# Patient Record
Sex: MALE | Race: WHITE | NOT HISPANIC OR LATINO | Employment: FULL TIME | URBAN - METROPOLITAN AREA
[De-identification: names, ages, dates, MRNs, and addresses within clinical notes are randomized per-mention and may not be internally consistent; named-entity substitution may affect disease eponyms.]

---

## 2018-06-28 ENCOUNTER — OFFICE VISIT CONVERTED (OUTPATIENT)
Dept: INTERNAL MEDICINE | Facility: CLINIC | Age: 68
End: 2018-06-28
Attending: NURSE PRACTITIONER

## 2019-01-07 ENCOUNTER — OFFICE VISIT CONVERTED (OUTPATIENT)
Dept: INTERNAL MEDICINE | Facility: CLINIC | Age: 69
End: 2019-01-07
Attending: NURSE PRACTITIONER

## 2019-06-16 ENCOUNTER — HOSPITAL ENCOUNTER (OUTPATIENT)
Dept: URGENT CARE | Facility: CLINIC | Age: 69
Discharge: HOME | End: 2019-06-16
Attending: FAMILY MEDICINE

## 2019-06-28 ENCOUNTER — OFFICE VISIT CONVERTED (OUTPATIENT)
Dept: INTERNAL MEDICINE | Facility: CLINIC | Age: 69
End: 2019-06-28
Attending: NURSE PRACTITIONER

## 2019-07-03 ENCOUNTER — CONVERSION ENCOUNTER (OUTPATIENT)
Dept: SURGERY | Facility: CLINIC | Age: 69
End: 2019-07-03

## 2019-07-03 ENCOUNTER — OFFICE VISIT CONVERTED (OUTPATIENT)
Dept: UROLOGY | Facility: CLINIC | Age: 69
End: 2019-07-03
Attending: UROLOGY

## 2019-07-08 ENCOUNTER — OFFICE VISIT CONVERTED (OUTPATIENT)
Dept: INTERNAL MEDICINE | Facility: CLINIC | Age: 69
End: 2019-07-08
Attending: NURSE PRACTITIONER

## 2019-10-01 ENCOUNTER — OFFICE VISIT CONVERTED (OUTPATIENT)
Dept: UROLOGY | Facility: CLINIC | Age: 69
End: 2019-10-01
Attending: UROLOGY

## 2020-01-09 ENCOUNTER — OFFICE VISIT CONVERTED (OUTPATIENT)
Dept: INTERNAL MEDICINE | Facility: CLINIC | Age: 70
End: 2020-01-09
Attending: NURSE PRACTITIONER

## 2020-01-24 ENCOUNTER — HOSPITAL ENCOUNTER (OUTPATIENT)
Dept: OTHER | Facility: HOSPITAL | Age: 70
Discharge: HOME | End: 2020-01-24
Attending: NURSE PRACTITIONER

## 2020-01-24 LAB
BASOPHILS # BLD AUTO: 0.09 10*3/UL (ref 0–0.2)
BASOPHILS # BLD: 1 % (ref 0–3)
BASOPHILS NFR BLD AUTO: 0.5 % (ref 0–3)
CLUMPED PLATELETS: ABNORMAL
CONV ABS BANDS: 0 % (ref 1–5)
CONV ABS IMM GRAN: 0.07 10*3/UL (ref 0–0.2)
CONV ANISOCYTES: SLIGHT
CONV ATYPICAL LYMPHOCYTES: 27 % (ref 0–5)
CONV BLASTS: 2 %
CONV HYPOCHROMIA IN BLOOD BY LIGHT MICROSCOPY: SLIGHT
CONV IMMATURE GRAN: 0.4 % (ref 0–1.8)
CONV SEGMENTED NEUTROPHILS: 38 % (ref 45–70)
DACRYOCYTES BLD QL SMEAR: ABNORMAL
DEPRECATED RDW RBC AUTO: 42.6 FL (ref 35.1–43.9)
EOSINOPHIL # BLD AUTO: 0.21 10*3/UL (ref 0–0.7)
EOSINOPHIL # BLD AUTO: 1.2 % (ref 0–7)
EOSINOPHIL NFR BLD AUTO: 2 % (ref 0–7)
ERYTHROCYTE [DISTWIDTH] IN BLOOD BY AUTOMATED COUNT: 12.8 % (ref 11.6–14.4)
GIANT PLATELETS: ABNORMAL
HCT VFR BLD AUTO: 41.3 % (ref 42–52)
HGB BLD-MCNC: 12.9 G/DL (ref 14–18)
HYPOGRANULAR PLATELETS: SLIGHT
LARGE PLATELETS: SLIGHT
LYMPHOCYTES # BLD AUTO: 10.63 10*3/UL (ref 1–5)
LYMPHOCYTES NFR BLD AUTO: 62.7 % (ref 20–45)
MCH RBC QN AUTO: 28.2 PG (ref 27–31)
MCHC RBC AUTO-ENTMCNC: 31.2 G/DL (ref 33–37)
MCV RBC AUTO: 90.2 FL (ref 80–96)
MICROCYTES BLD QL: SLIGHT
MONOCYTES # BLD AUTO: 0.95 10*3/UL (ref 0.2–1.2)
MONOCYTES NFR BLD AUTO: 5.6 % (ref 3–10)
MONOCYTES NFR BLD MANUAL: 6 % (ref 3–10)
NEUTROPHILS # BLD AUTO: 5 10*3/UL (ref 2–8)
NEUTROPHILS NFR BLD AUTO: 29.6 % (ref 30–85)
NRBC CBCN: 0 % (ref 0–0.7)
NUC CELL # PRT MANUAL: 0 /100{WBCS}
OVALOCYTES BLD QL SMEAR: ABNORMAL
PLAT MORPH BLD: ABNORMAL
PLATELET # BLD AUTO: 234 10*3/UL (ref 130–400)
PMV BLD AUTO: 10.7 FL (ref 9.4–12.4)
POIKILOCYTOSIS BLD QL SMEAR: ABNORMAL
RBC # BLD AUTO: 4.58 10*6/UL (ref 4.7–6.1)
SMALL PLATELETS BLD QL SMEAR: ADEQUATE
SMUDGE CELLS IN BLOOD BY LIGHT MICROSCOPY: ABNORMAL
VARIANT LYMPHS NFR BLD MANUAL: 24 % (ref 20–45)
WBC # BLD AUTO: 16.95 10*3/UL (ref 4.8–10.8)

## 2020-03-24 ENCOUNTER — OFFICE VISIT CONVERTED (OUTPATIENT)
Dept: ONCOLOGY | Facility: HOSPITAL | Age: 70
End: 2020-03-24
Attending: INTERNAL MEDICINE

## 2020-03-24 ENCOUNTER — HOSPITAL ENCOUNTER (OUTPATIENT)
Dept: ONCOLOGY | Facility: HOSPITAL | Age: 70
Discharge: HOME | End: 2020-03-24
Attending: INTERNAL MEDICINE

## 2020-03-24 LAB
ALBUMIN SERPL-MCNC: 4.2 G/DL (ref 3.5–5)
ALBUMIN/GLOB SERPL: 1.6 {RATIO} (ref 1.4–2.6)
ALP SERPL-CCNC: 116 U/L (ref 56–155)
ALT SERPL-CCNC: 17 U/L (ref 10–40)
ANION GAP SERPL CALC-SCNC: 16 MMOL/L (ref 8–19)
AST SERPL-CCNC: 16 U/L (ref 15–50)
BASOPHILS # BLD AUTO: 0.09 10*3/UL (ref 0–0.2)
BASOPHILS NFR BLD AUTO: 0.5 % (ref 0–3)
BILIRUB SERPL-MCNC: 0.49 MG/DL (ref 0.2–1.3)
BUN SERPL-MCNC: 29 MG/DL (ref 5–25)
BUN/CREAT SERPL: 23 {RATIO} (ref 6–20)
CALCIUM SERPL-MCNC: 9.4 MG/DL (ref 8.7–10.4)
CHLORIDE SERPL-SCNC: 100 MMOL/L (ref 99–111)
CONV ABS IMM GRAN: 0.04 10*3/UL (ref 0–0.2)
CONV CO2: 25 MMOL/L (ref 22–32)
CONV IMMATURE GRAN: 0.2 % (ref 0–1.8)
CONV TOTAL PROTEIN: 6.9 G/DL (ref 6.3–8.2)
CREAT UR-MCNC: 1.26 MG/DL (ref 0.7–1.2)
DEPRECATED RDW RBC AUTO: 41.8 FL (ref 35.1–43.9)
EOSINOPHIL # BLD AUTO: 0.16 10*3/UL (ref 0–0.7)
EOSINOPHIL # BLD AUTO: 1 % (ref 0–7)
ERYTHROCYTE [DISTWIDTH] IN BLOOD BY AUTOMATED COUNT: 12.9 % (ref 11.6–14.4)
GFR SERPLBLD BASED ON 1.73 SQ M-ARVRAT: 59 ML/MIN/{1.73_M2}
GLOBULIN UR ELPH-MCNC: 2.7 G/DL (ref 2–3.5)
GLUCOSE SERPL-MCNC: 126 MG/DL (ref 70–99)
HCT VFR BLD AUTO: 46.5 % (ref 42–52)
HGB BLD-MCNC: 14.7 G/DL (ref 14–18)
LDH SERPL-CCNC: 165 U/L (ref 120–240)
LYMPHOCYTES # BLD AUTO: 9.69 10*3/UL (ref 1–5)
LYMPHOCYTES NFR BLD AUTO: 58.6 % (ref 20–45)
MCH RBC QN AUTO: 28.3 PG (ref 27–31)
MCHC RBC AUTO-ENTMCNC: 31.6 G/DL (ref 33–37)
MCV RBC AUTO: 89.4 FL (ref 80–96)
MONOCYTES # BLD AUTO: 0.87 10*3/UL (ref 0.2–1.2)
MONOCYTES NFR BLD AUTO: 5.3 % (ref 3–10)
NEUTROPHILS # BLD AUTO: 5.69 10*3/UL (ref 2–8)
NEUTROPHILS NFR BLD AUTO: 34.4 % (ref 30–85)
NRBC CBCN: 0 % (ref 0–0.7)
OSMOLALITY SERPL CALC.SUM OF ELEC: 291 MOSM/KG (ref 273–304)
PLATELET # BLD AUTO: 240 10*3/UL (ref 130–400)
PMV BLD AUTO: 10.6 FL (ref 9.4–12.4)
POTASSIUM SERPL-SCNC: 4.1 MMOL/L (ref 3.5–5.3)
RBC # BLD AUTO: 5.2 10*6/UL (ref 4.7–6.1)
SODIUM SERPL-SCNC: 137 MMOL/L (ref 135–147)
WBC # BLD AUTO: 16.54 10*3/UL (ref 4.8–10.8)

## 2020-03-26 LAB — CONV LEUKEMIA/LYMPHOMA PHENOTYPING PROFILE (BLOOD): NORMAL

## 2020-03-31 LAB — GENETIC DISEASES BLD/T FISH: NORMAL

## 2020-04-02 ENCOUNTER — HOSPITAL ENCOUNTER (OUTPATIENT)
Dept: LAB | Facility: HOSPITAL | Age: 70
Discharge: HOME | End: 2020-04-02
Attending: NURSE PRACTITIONER

## 2020-04-02 LAB
ALBUMIN SERPL-MCNC: 4.1 G/DL (ref 3.5–5)
ALBUMIN/GLOB SERPL: 1.7 {RATIO} (ref 1.4–2.6)
ALP SERPL-CCNC: 110 U/L (ref 56–155)
ALT SERPL-CCNC: 20 U/L (ref 10–40)
ANION GAP SERPL CALC-SCNC: 19 MMOL/L (ref 8–19)
AST SERPL-CCNC: 15 U/L (ref 15–50)
BASOPHILS # BLD AUTO: 0.1 10*3/UL (ref 0–0.2)
BASOPHILS NFR BLD AUTO: 0.6 % (ref 0–3)
BILIRUB SERPL-MCNC: 0.47 MG/DL (ref 0.2–1.3)
BUN SERPL-MCNC: 27 MG/DL (ref 5–25)
BUN/CREAT SERPL: 23 {RATIO} (ref 6–20)
CALCIUM SERPL-MCNC: 9.2 MG/DL (ref 8.7–10.4)
CHLORIDE SERPL-SCNC: 103 MMOL/L (ref 99–111)
CHOLEST SERPL-MCNC: 170 MG/DL (ref 107–200)
CHOLEST/HDLC SERPL: 4 {RATIO} (ref 3–6)
CONV ABS IMM GRAN: 0.07 10*3/UL (ref 0–0.2)
CONV CO2: 25 MMOL/L (ref 22–32)
CONV IMMATURE GRAN: 0.4 % (ref 0–1.8)
CONV TOTAL PROTEIN: 6.5 G/DL (ref 6.3–8.2)
CREAT UR-MCNC: 1.19 MG/DL (ref 0.7–1.2)
DEPRECATED RDW RBC AUTO: 42.5 FL (ref 35.1–43.9)
EOSINOPHIL # BLD AUTO: 0.18 10*3/UL (ref 0–0.7)
EOSINOPHIL # BLD AUTO: 1 % (ref 0–7)
ERYTHROCYTE [DISTWIDTH] IN BLOOD BY AUTOMATED COUNT: 13 % (ref 11.6–14.4)
EST. AVERAGE GLUCOSE BLD GHB EST-MCNC: 134 MG/DL
GFR SERPLBLD BASED ON 1.73 SQ M-ARVRAT: >60 ML/MIN/{1.73_M2}
GLOBULIN UR ELPH-MCNC: 2.4 G/DL (ref 2–3.5)
GLUCOSE SERPL-MCNC: 115 MG/DL (ref 70–99)
HBA1C MFR BLD: 6.3 % (ref 3.5–5.7)
HCT VFR BLD AUTO: 44.6 % (ref 42–52)
HDLC SERPL-MCNC: 43 MG/DL (ref 40–60)
HGB BLD-MCNC: 14 G/DL (ref 14–18)
LDLC SERPL CALC-MCNC: 111 MG/DL (ref 70–100)
LYMPHOCYTES # BLD AUTO: 10.86 10*3/UL (ref 1–5)
LYMPHOCYTES NFR BLD AUTO: 62 % (ref 20–45)
MCH RBC QN AUTO: 28.2 PG (ref 27–31)
MCHC RBC AUTO-ENTMCNC: 31.4 G/DL (ref 33–37)
MCV RBC AUTO: 89.7 FL (ref 80–96)
MONOCYTES # BLD AUTO: 1.03 10*3/UL (ref 0.2–1.2)
MONOCYTES NFR BLD AUTO: 5.9 % (ref 3–10)
NEUTROPHILS # BLD AUTO: 5.27 10*3/UL (ref 2–8)
NEUTROPHILS NFR BLD AUTO: 30.1 % (ref 30–85)
NRBC CBCN: 0 % (ref 0–0.7)
OSMOLALITY SERPL CALC.SUM OF ELEC: 302 MOSM/KG (ref 273–304)
PLATELET # BLD AUTO: 232 10*3/UL (ref 130–400)
PMV BLD AUTO: 10.4 FL (ref 9.4–12.4)
POTASSIUM SERPL-SCNC: 3.9 MMOL/L (ref 3.5–5.3)
PSA SERPL-MCNC: 9.77 NG/ML (ref 0–4)
RBC # BLD AUTO: 4.97 10*6/UL (ref 4.7–6.1)
SODIUM SERPL-SCNC: 143 MMOL/L (ref 135–147)
TRIGL SERPL-MCNC: 80 MG/DL (ref 40–150)
TSH SERPL-ACNC: 1.94 M[IU]/L (ref 0.27–4.2)
VLDLC SERPL-MCNC: 16 MG/DL (ref 5–37)
WBC # BLD AUTO: 17.51 10*3/UL (ref 4.8–10.8)

## 2020-04-10 ENCOUNTER — TELEMEDICINE CONVERTED (OUTPATIENT)
Dept: INTERNAL MEDICINE | Facility: CLINIC | Age: 70
End: 2020-04-10
Attending: NURSE PRACTITIONER

## 2020-06-12 ENCOUNTER — HOSPITAL ENCOUNTER (OUTPATIENT)
Dept: SLEEP MEDICINE | Facility: HOSPITAL | Age: 70
Discharge: HOME | End: 2020-06-12
Attending: INTERNAL MEDICINE

## 2020-06-16 ENCOUNTER — HOSPITAL ENCOUNTER (OUTPATIENT)
Dept: ONCOLOGY | Facility: HOSPITAL | Age: 70
Discharge: HOME | End: 2020-06-16
Attending: INTERNAL MEDICINE

## 2020-06-16 ENCOUNTER — OFFICE VISIT CONVERTED (OUTPATIENT)
Dept: ONCOLOGY | Facility: HOSPITAL | Age: 70
End: 2020-06-16
Attending: INTERNAL MEDICINE

## 2020-06-16 LAB
ALBUMIN SERPL-MCNC: 4 G/DL (ref 3.5–5)
ALBUMIN/GLOB SERPL: 1.8 {RATIO} (ref 1.4–2.6)
ALP SERPL-CCNC: 114 U/L (ref 56–155)
ALT SERPL-CCNC: 17 U/L (ref 10–40)
ANION GAP SERPL CALC-SCNC: 13 MMOL/L (ref 8–19)
AST SERPL-CCNC: 15 U/L (ref 15–50)
BASOPHILS # BLD AUTO: 0.05 10*3/UL (ref 0–0.2)
BASOPHILS NFR BLD AUTO: 0.3 % (ref 0–3)
BILIRUB SERPL-MCNC: 0.66 MG/DL (ref 0.2–1.3)
BUN SERPL-MCNC: 22 MG/DL (ref 5–25)
BUN/CREAT SERPL: 20 {RATIO} (ref 6–20)
CALCIUM SERPL-MCNC: 8.7 MG/DL (ref 8.7–10.4)
CALCIUM SPEC-SCNC: 8.7 MG/DL (ref 8.7–10.4)
CHLORIDE SERPL-SCNC: 101 MMOL/L (ref 99–111)
CONV ABS IMM GRAN: 0.04 10*3/UL (ref 0–0.54)
CONV CO2: 28 MMOL/L (ref 22–32)
CONV EOSINOPHILS PERCENT BY MANUAL COUNT: 1.8 % (ref 0–7)
CONV IMMATURE GRAN: 0.3 % (ref 0–0.4)
CONV TOTAL PROTEIN: 6.2 G/DL (ref 6.3–8.2)
CREAT UR-MCNC: 1.12 MG/DL (ref 0.7–1.2)
EOSINOPHIL # BLD MANUAL: 0.28 10*3/UL (ref 0–0.7)
ERYTHROCYTE [DISTWIDTH] IN BLOOD BY AUTOMATED COUNT: 12.7 % (ref 11.5–14.5)
ERYTHROCYTE [DISTWIDTH] IN BLOOD BY AUTOMATED COUNT: 41.5 FL
GFR SERPLBLD BASED ON 1.73 SQ M-ARVRAT: >60 ML/MIN/{1.73_M2}
GLOBULIN UR ELPH-MCNC: 2.2 G/DL (ref 2–3.5)
GLUCOSE SERPL-MCNC: 133 MG/DL (ref 70–99)
HBA1C MFR BLD: 14.1 G/DL (ref 14–18)
HCT VFR BLD AUTO: 43 % (ref 42–52)
LDH SERPL-CCNC: 191 U/L (ref 120–240)
LYMPHOCYTES # BLD AUTO: 9.22 10*3/UL (ref 1–5)
LYMPHOCYTES NFR BLD AUTO: 59.4 % (ref 20–45)
MCH RBC QN AUTO: 28.8 PG (ref 27–31)
MCHC RBC AUTO-ENTMCNC: 32.8 G/DL (ref 33–37)
MCV RBC AUTO: 87.8 FL (ref 80–96)
MONOCYTES # BLD AUTO: 1 10*3/UL (ref 0.2–1.2)
MONOCYTES NFR BLD MANUAL: 6.4 % (ref 3–10)
NEUTROPHILS # BLD AUTO: 4.92 10*3/UL (ref 2–8)
NEUTROPHILS NFR BLD MANUAL: 31.8 % (ref 30–85)
OSMOLALITY SERPL CALC.SUM OF ELEC: 291 MOSM/KG (ref 273–304)
PATHOLOGY REVIEW: NORMAL
PLATELET # BLD AUTO: 216 10*3/UL (ref 130–400)
PMV BLD AUTO: 9.6 FL (ref 7.4–10.4)
POTASSIUM SERPL-SCNC: 3.8 MMOL/L (ref 3.5–5.3)
RBC MORPH BLD: 4.9 10*6/UL (ref 4.7–6.1)
SODIUM SERPL-SCNC: 138 MMOL/L (ref 135–147)
WBC # BLD AUTO: 15.51 10*3/UL (ref 4.8–10.8)

## 2020-10-09 ENCOUNTER — HOSPITAL ENCOUNTER (OUTPATIENT)
Dept: LAB | Facility: HOSPITAL | Age: 70
Discharge: HOME | End: 2020-10-09
Attending: NURSE PRACTITIONER

## 2020-10-09 LAB
ALBUMIN SERPL-MCNC: 4.1 G/DL (ref 3.5–5)
ALBUMIN/GLOB SERPL: 1.7 {RATIO} (ref 1.4–2.6)
ALP SERPL-CCNC: 110 U/L (ref 56–155)
ALT SERPL-CCNC: 18 U/L (ref 10–40)
ANION GAP SERPL CALC-SCNC: 15 MMOL/L (ref 8–19)
AST SERPL-CCNC: 20 U/L (ref 15–50)
BILIRUB SERPL-MCNC: 0.75 MG/DL (ref 0.2–1.3)
BUN SERPL-MCNC: 21 MG/DL (ref 5–25)
BUN/CREAT SERPL: 18 {RATIO} (ref 6–20)
CALCIUM SERPL-MCNC: 9.5 MG/DL (ref 8.7–10.4)
CHLORIDE SERPL-SCNC: 104 MMOL/L (ref 99–111)
CHOLEST SERPL-MCNC: 148 MG/DL (ref 107–200)
CHOLEST/HDLC SERPL: 3.2 {RATIO} (ref 3–6)
CONV CO2: 25 MMOL/L (ref 22–32)
CONV TOTAL PROTEIN: 6.5 G/DL (ref 6.3–8.2)
CREAT UR-MCNC: 1.19 MG/DL (ref 0.7–1.2)
GFR SERPLBLD BASED ON 1.73 SQ M-ARVRAT: >60 ML/MIN/{1.73_M2}
GLOBULIN UR ELPH-MCNC: 2.4 G/DL (ref 2–3.5)
GLUCOSE SERPL-MCNC: 86 MG/DL (ref 70–99)
HDLC SERPL-MCNC: 46 MG/DL (ref 40–60)
LDLC SERPL CALC-MCNC: 87 MG/DL (ref 70–100)
OSMOLALITY SERPL CALC.SUM OF ELEC: 292 MOSM/KG (ref 273–304)
POTASSIUM SERPL-SCNC: 4.1 MMOL/L (ref 3.5–5.3)
SODIUM SERPL-SCNC: 140 MMOL/L (ref 135–147)
TRIGL SERPL-MCNC: 75 MG/DL (ref 40–150)
VLDLC SERPL-MCNC: 15 MG/DL (ref 5–37)

## 2020-10-10 LAB
EST. AVERAGE GLUCOSE BLD GHB EST-MCNC: 134 MG/DL
HBA1C MFR BLD: 6.3 % (ref 3.5–5.7)

## 2020-10-12 ENCOUNTER — OFFICE VISIT CONVERTED (OUTPATIENT)
Dept: INTERNAL MEDICINE | Facility: CLINIC | Age: 70
End: 2020-10-12
Attending: NURSE PRACTITIONER

## 2020-10-12 LAB — PSA SERPL-MCNC: 11.28 NG/ML (ref 0–4)

## 2020-11-17 ENCOUNTER — TELEPHONE CONVERTED (OUTPATIENT)
Dept: UROLOGY | Facility: CLINIC | Age: 70
End: 2020-11-17
Attending: UROLOGY

## 2020-12-14 ENCOUNTER — HOSPITAL ENCOUNTER (OUTPATIENT)
Dept: ONCOLOGY | Facility: HOSPITAL | Age: 70
Discharge: HOME | End: 2020-12-14
Attending: INTERNAL MEDICINE

## 2020-12-14 LAB
ALBUMIN SERPL-MCNC: 4.1 G/DL (ref 3.5–5)
ALBUMIN/GLOB SERPL: 1.9 {RATIO} (ref 1.4–2.6)
ALP SERPL-CCNC: 110 U/L (ref 56–155)
ALT SERPL-CCNC: 19 U/L (ref 10–40)
ANION GAP SERPL CALC-SCNC: 9 MMOL/L (ref 8–19)
AST SERPL-CCNC: 14 U/L (ref 15–50)
BASOPHILS # BLD AUTO: 0.03 10*3/UL (ref 0–0.2)
BASOPHILS # BLD: 0 % (ref 0–3)
BASOPHILS NFR BLD AUTO: 0.2 % (ref 0–3)
BILIRUB SERPL-MCNC: 0.63 MG/DL (ref 0.2–1.3)
BUN SERPL-MCNC: 20 MG/DL (ref 5–25)
BUN/CREAT SERPL: 18 {RATIO} (ref 6–20)
CALCIUM SERPL-MCNC: 9.2 MG/DL (ref 8.7–10.4)
CHLORIDE SERPL-SCNC: 104 MMOL/L (ref 99–111)
CONV ABS BANDS: 0 % (ref 1–5)
CONV ABS IMM GRAN: 0.02 10*3/UL (ref 0–0.54)
CONV ATYPICAL LYMPHOCYTES: 33 % (ref 0–5)
CONV CO2: 30 MMOL/L (ref 22–32)
CONV EOSINOPHILS PERCENT BY MANUAL COUNT: 1.2 % (ref 0–7)
CONV IMMATURE GRAN: 0.1 % (ref 0–0.4)
CONV SEGMENTED NEUTROPHILS: 45 % (ref 45–70)
CONV TOTAL PROTEIN: 6.3 G/DL (ref 6.3–8.2)
CREAT UR-MCNC: 1.09 MG/DL (ref 0.7–1.2)
EOSINOPHIL # BLD MANUAL: 0.18 10*3/UL (ref 0–0.7)
EOSINOPHIL NFR BLD AUTO: 0 % (ref 0–7)
ERYTHROCYTE [DISTWIDTH] IN BLOOD BY AUTOMATED COUNT: 12.9 % (ref 11.5–14.5)
ERYTHROCYTE [DISTWIDTH] IN BLOOD BY AUTOMATED COUNT: 41.7 FL
GFR SERPLBLD BASED ON 1.73 SQ M-ARVRAT: >60 ML/MIN/{1.73_M2}
GLOBULIN UR ELPH-MCNC: 2.2 G/DL (ref 2–3.5)
GLUCOSE SERPL-MCNC: 117 MG/DL (ref 70–99)
HBA1C MFR BLD: 14.7 G/DL (ref 14–18)
HCT VFR BLD AUTO: 46.1 % (ref 42–52)
LDH SERPL-CCNC: 143 U/L (ref 120–240)
LYMPHOCYTES # BLD AUTO: 8.71 10*3/UL (ref 1–5)
LYMPHOCYTES NFR BLD AUTO: 55.7 % (ref 20–45)
MCH RBC QN AUTO: 27.9 PG (ref 27–31)
MCHC RBC AUTO-ENTMCNC: 31.9 G/DL (ref 33–37)
MCV RBC AUTO: 87.5 FL (ref 80–96)
MONOCYTES # BLD AUTO: 0.79 10*3/UL (ref 0.2–1.2)
MONOCYTES NFR BLD MANUAL: 3 % (ref 3–10)
MONOCYTES NFR BLD MANUAL: 5.1 % (ref 3–10)
NEUTROPHILS # BLD AUTO: 5.9 10*3/UL (ref 2–8)
NEUTROPHILS NFR BLD MANUAL: 37.7 % (ref 30–85)
NUC CELL # PRT MANUAL: 0 /100{WBCS}
OSMOLALITY SERPL CALC.SUM OF ELEC: 292 MOSM/KG (ref 273–304)
PATHOLOGY REVIEW: NORMAL
PLAT MORPH BLD: NORMAL
PLATELET # BLD AUTO: 233 10*3/UL (ref 130–400)
PMV BLD AUTO: 9.6 FL (ref 7.4–10.4)
POTASSIUM SERPL-SCNC: 4.4 MMOL/L (ref 3.5–5.3)
RBC MORPH BLD: 5.27 10*6/UL (ref 4.7–6.1)
SMALL PLATELETS BLD QL SMEAR: ADEQUATE
SMUDGE CELLS IN BLOOD BY LIGHT MICROSCOPY: SLIGHT
SODIUM SERPL-SCNC: 139 MMOL/L (ref 135–147)
VARIANT LYMPHS NFR BLD MANUAL: 19 % (ref 20–45)
WBC # BLD AUTO: 15.63 10*3/UL (ref 4.8–10.8)

## 2020-12-15 ENCOUNTER — OFFICE VISIT CONVERTED (OUTPATIENT)
Dept: ONCOLOGY | Facility: HOSPITAL | Age: 70
End: 2020-12-15
Attending: INTERNAL MEDICINE

## 2020-12-15 ENCOUNTER — HOSPITAL ENCOUNTER (OUTPATIENT)
Dept: ONCOLOGY | Facility: HOSPITAL | Age: 70
Discharge: HOME | End: 2020-12-15
Attending: INTERNAL MEDICINE

## 2021-05-12 NOTE — PROGRESS NOTES
Quick Note      Patient Name: Quique Valdez   Patient ID: [id number]   Sex: Male   YOB: 1950    Primary Care Provider: Charmaine ASIF   Referring Provider: Charmaine ASIF    Visit Date: April 10, 2020    Provider: YEFRI Mandujano   Location: [facility]   Location Address: [address] [place] [address], [address]  [id number]   Location Phone: [phone]          History Of Present Illness  TELEHEALTH VISIT  Chief Complaint: f/u   Quique Valdez is a 69 year old /White male who is presenting for evaluation via telehealth visit. Verbal consent obtained before beginning visit.   Provider spent 13 minutes with the patient during telehealth visit.   The following staff were present during this visit: Charmaine Pate NP   Past Medical History/Overview of Patient Symptoms     still working at [employer], security    Has seen hem/onc for leukocytosis, q3 mths    Seeing urology-for PSA, had MRI, PSA 9.7 down from 16    labs reviewed    HLD-has been taking pravastatin 40mg daily after finishing last prescription where he was taking 2 40mg tabs    via phone       Physical Examination                Assessment  · Elevated Prostate Specific Antigen (PSA)     790.93/R97.2  f/u with Dr. Lane  · Impaired fasting blood sugar     790.21/R73.01  labs q6 mth  · HTN (hypertension)     401.9/I10  doing well, cont meds  · HLD (hyperlipidemia)     272.4/E78.5  restarting pravastatin 80mg, discussed LDL <70  · Leukocytosis     288.60/D72.829  f/u hem/onc    Problems Reconciled  Plan  · Orders  o Physician Telephone Evaluation, 11-20 minutes (87706) - - 04/10/2020  o Hgb A1c [facility] (24016) - 790.21/R73.01 - 10/10/2020  o HTN/Lipid Panel (CMP, Lipid) [facility] (48169, 17481) - 401.9/I10, 272.4/E78.5 - 10/10/2020  · Medications  o Medications have been Reconciled  o Transition of Care or Provider Policy  · Instructions  o Plan Of Care:   o Chronic conditions reviewed and  taken into consideration for today's treatment plan.  o Patient instructed to seek medical attention urgently for new or worsening symptoms.  o Patient was educated/instructed on their diagnosis, treatment and medications prior to discharge from the clinic today.  o Discussed Covid-19 precautions including, but not limited to, social distancing, avoid touching your face, and hand washing.   · Disposition  o Call or Return if symptoms worsen or persist.  o Follow up in 6 months  o Prescriptions sent electronically            Electronically Signed by: Charmaine Pate APRN -Author on April 28, 2020 12:24:43 PM

## 2021-05-13 NOTE — PROGRESS NOTES
Progress Note      Patient Name: Quique Valdez   Patient ID: [id number]   Sex: Male   YOB: 1950    Primary Care Provider: Charmaine ASIF   Referring Provider: Charmaine ASIF    Visit Date: October 12, 2020    Provider: YEFRI Mandujano   Location[facility]   Location Address: [address] [place] [address]  [id number]   Location Phone: [phone]          Chief Complaint  · Follow-up  · HTN  · HLD  · BPH      History Of Present Illness  Quique Valdez is a 69 year old /White male who presents for evaluation and treatment of:      AWV:  AAA screening; defers  Hep c screening: in the .   Flu shot: 10/10/2020 at work.     PSA needs-recent MRI for prostate, believes he had PSA 6 mths  recent labs, cmp/lipid, a1c.  Does not need refills at this time    HTN-elevated today at the office. patient reports coming to visits makes his BP go up. He has also not taken his BP medication yet today. 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    HLD-no leg cramps, taking pravastatin 40mg       Past Medical History  Disease Name Date Onset Notes   BPH (benign prostatic hyperplasia) 10/01/2019 --    Elevated Prostate Specific Antigen (PSA) --  --    GERD --  --    High blood pressure --  --    High cholesterol --  --          Past Surgical History  Procedure Name Date Notes   Colonoscopy 2007 2018 --    Hernia 2005 --          Medication List  Name Date Started Instructions   aspirin 81 mg oral tablet,delayed release (DR/EC) 04/10/2020 take 1 tablet (81 mg) by oral route once daily for 90 days   hydrochlorothiazide 12.5 mg oral tablet 04/10/2020 take 1 tablet (12.5 mg) by oral route once daily for 90 days   Lotrel 5-20 mg oral capsule 04/10/2020 take 1 capsule by oral route once daily for 90 days   omeprazole 20 mg oral capsule,delayed release(DR/EC) 04/10/2020 take 1 capsule (20 mg) by oral route once daily before a meal for 90  "days   pravastatin 80 mg oral tablet 04/10/2020 take 1 tablet (80 mg) by oral route once daily   Uroxatral 10 mg oral tablet extended release 24 hr 04/10/2020 take 1 tablet (10 mg) by oral route once daily for 90 days   Viagra 100 mg oral tablet 04/10/2020 take 1 tablet (100 mg) by oral route once daily as needed approximately 1 hour before sexual activity for 90 days         Allergy List  Allergen Name Date Reaction Notes   NO KNOWN DRUG ALLERGIES --  --  --          Family Medical History  Disease Name Relative/Age Notes   - No Family History of Colorectal Cancer  --    Lung cancer Father/   --          Social History  Finding Status Start/Stop Quantity Notes   Alcohol Current some day --/-- --  occasionally   Tobacco Former 21/57 1 to 1 1/2 pks --          Review of Systems  · Constitutional  o Denies  o : fever, fatigue, weight loss, weight gain  · Cardiovascular  o Denies  o : lower extremity edema, claudication, chest pressure, palpitations  · Respiratory  o Denies  o : shortness of breath, wheezing, cough, hemoptysis, dyspnea on exertion  · Gastrointestinal  o Denies  o : nausea, vomiting, diarrhea, constipation, abdominal pain      Vitals  Date Time BP Position Site L\R Cuff Size HR RR TEMP (F) WT  HT  BMI kg/m2 BSA m2 O2 Sat FR L/min FiO2 HC       10/01/2019 08:57 AM       14  267lbs 0oz 5'  10\" 38.31 2.45       01/09/2020 08:47 /82 Sitting    70 - R 14 98.2 281lbs 0oz 5'  10\" 40.32 2.51 97 %  21%    10/12/2020 09:34 /78 Sitting    78 - R 16 98.1 284lbs 8oz 5'  10\" 40.82 2.52 97 %  21%          Physical Examination  · Constitutional  o Appearance  o : no acute distress, well-nourished  · Head and Face  o Head  o :   § Inspection  § : atraumatic, normocephalic  · Eyes  o Eyes  o : extraocular movements intact, no scleral icterus, no conjunctival injection  · Ears, Nose, Mouth and Throat  o Ears  o :   § External Ears  § : normal  o Nose  o :   § Intranasal Exam  § : nares patent  o Oral " Cavity  o :   § Oral Mucosa  § : moist mucous membranes  · Respiratory  o Respiratory Effort  o : breathing comfortably, symmetric chest rise  o Auscultation of Lungs  o : clear to asculatation bilaterally, no wheezes, rales, or rhonchii  · Cardiovascular  o Heart  o :   § Auscultation of Heart  § : regular rate and rhythm, no murmurs, rubs, or gallops  o Peripheral Vascular System  o :   § Extremities  § : no edema  · Lymphatic  o Neck  o : no lymphadenopathy present  o Supraclavicular Nodes  o : no supraclavicular nodes  · Neurologic  o Mental Status Examination  o :   § Orientation  § : grossly oriented to person, place and time  o Gait and Station  o :   § Gait Screening  § : normal gait  · Psychiatric  o General  o : normal mood and affect          Assessment  · Elevated Prostate Specific Antigen (PSA)     790.93/R97.2  he reports he recently got a letter for urology f/u.  will try to add PSA onto labs drawn on 10/10  · Essential hypertension     401.9/I10  Discussed elevated blood pressure at today's visit. Discussed risks of blood pressure elevation including death, heart attack, stroke, chronic kidney disease, blindness. Follow a low-salt diet and increase exercise. Discussed importance of medication compliance and avoidance of missing doses. Continue current meds at this time. We will monitor at follow-up and adjust blood pressure medications if necessary. ER with chest pain, palpitations, vision loss, unilateral weakness, or altered mental status. Patient verbalized understanding  · Hyperlipidemia     272.4/E78.5  discussed goal of <70. he prefers to continue with current dosing and modify diet      Plan  · Orders  o Male Physical Primary Care Panel (CMP, CBC, TSH, Lipid, PSA) [facility] (48007, 39139, 91425, 22020, 53761, ) - 401.9/I10, 272.4/E78.5, 790.93/R97.2 - 04/12/2021  o ACO-39: Current medications updated and reviewed (1159F, ) - - 10/12/2020  · Medications  o Medications have been  Reconciled  o Transition of Care or Provider Policy  · Instructions  o Advised that cheeses and other sources of dairy fats, animal fats, fast food, and the extras (candy, pastries, pies, doughnuts and cookies) all contain LDL raising nutrients. Advised to increase fruits, vegetables, whole grains, and to monitor portion sizes.   o Patient was educated/instructed on their diagnosis, treatment and medications prior to discharge from the clinic today.  o Call the office with any concerns or questions.  · Disposition  o Call or Return if symptoms worsen or persist.  o Follow up in 6 months  o Labs to be printed            Electronically Signed by: Charmaine Pate APRN -Author on October 12, 2020 10:05:08 AM

## 2021-05-13 NOTE — PROGRESS NOTES
Progress Note      Patient Name: Quique Valdez   Patient ID: [id number]   Sex: Male   YOB: 1950    Primary Care Provider: Charmaine ASIF   Referring Provider: Charmaine ASIF    Visit Date: October 12, 2020    Provider: YEFRI Mandujano   [facility]   Location Address: [address] [place] [address]  [id number]   Location Phone: [phone]          Chief Complaint  · Annual Wellness Exam      History Of Present Illness  The patient is a 69 year old /White male who has come to this office for his Annual Wellness Visit.   His Primary Care Provider is Charmaine ASIF. His comprehensive Care Team list, including suppliers, has been updated on the Facesheet. His medical/family history, height, weight, BMI, and blood pressure have been reviewed and are in the chart. The Health Risk Assessment has been completed and scanned in the chart.   Medications are listed in the medication list.   The active problem list includes: BPH (benign prostatic hyperplasia), Elevated Prostate Specific Antigen (PSA), GERD, High blood pressure, and High cholesterol   The patient does not have a history of substance use.   Patient reports his diet is adequate.   The Mini-Cog has been administered and is scanned in chart. The results are negative. His cognitive function is without limitation.   A hearing loss screen was completed today and the result is negative.   Patient does not have any risk factors for depression. Patient completed the PHQ-9 today and it has been scanned in the chart. The total score is.   The Timed Up and Go screen was administered today and the result is negative.   The Rodarte Index of [place] in ADLs indicated full function (score of 6).   A Falls Risk Assessment has been completed, including a review of home fall hazards and medication review.   Overall, the patient's functional ability is noted by this provider to be  "within normal limits. His level of safety is noted to be within normal limits. His balance/gait is within normal limits. There have been no falls in the past year. Patient-specific home safety recommendations have been reviewed and a copy has been given to patient.   He denies issues with leaking urine.   There are no additional risk factors identified.   Living Will/Advanced Directive STATUS OF ADVANCED DIRECTIVE.   Personalized health advice was given to the patient and a written health screening schedule was established; see Plan for details.      Hep C screening: in the .   Flu shot : done   AAA screening: defers         Vitals  Date Time BP Position Site L\R Cuff Size HR RR TEMP (F) WT  HT  BMI kg/m2 BSA m2 O2 Sat FR L/min FiO2 HC       10/12/2020 09:34 /78 Sitting    78 - R 16 98.1 284lbs 8oz 5'  10\" 40.82 2.52 97 %  21%                  Assessment  · Encounter for Medicare annual wellness exam     V70.0/Z00.00  · Screening for depression     V79.0/Z13.89  · Screening for alcoholism     V79.1/Z13.39  · Need for influenza vaccination     V04.81/Z23    Problems Reconciled  Plan  · Orders  o Falls Risk Assessment Completed (3288F) - V70.0/Z00.00 - 10/12/2020  o Brief hearing screening (written) [facility] () - V70.0/Z00.00 - 10/12/2020  o Annual Wellness Visit-includes a Personalized Prevention Plan of Service (PPS), SUBSEQUENT VISIT (Medicare) () - V70.0/Z00.00 - 10/12/2020  o ACO-13: Fall Risk Screening with no falls in past year or only one fall without injury in the past year (1101F) - V70.0/Z00.00 - 10/12/2020  o Presence or absence of urinary incontinence assessed (RAMYA) (1090F) - V70.0/Z00.00 - 10/12/2020  o ACO-18: Negative screen for clinical depression using a standardized tool () - V79.0/Z13.89 - 10/12/2020  o Negative alcohol screening () - V79.1/Z13.39 - 10/12/2020  o ACO-39: Current medications updated and reviewed (, 7909F) - - 10/12/2020  o Influenza immunization was " ordered or administered () - V04.81/Z23 - 10/12/2020   10/10/2020 at patient's place of employement.   · Medications  o Medications have been Reconciled  o Transition of Care or Provider Policy  · Instructions  o Health Risk Assessment has been reviewed with the patient.  o Written health screening schedule for next 5-10 years was established with patient; information scanned in chart and given/mailed to patient.  o Fall prevention methods discussed and a copy of recommendations given/mailed to patient.  o Today's PHQ-9 score is: _0.__            Electronically Signed by: Charmaine Pate, APRN -Author on October 13, 2020 01:13:46 PM

## 2021-05-13 NOTE — PROGRESS NOTES
Progress Note      Patient Name: Quique Valdez   Patient ID: [id number]   Sex: Male   YOB: 1950    Primary Care Provider: Charmaine ASIF   Referring Provider: Charmaine ASIF    Visit Date: November 17, 2020    Provider: Lashonda Lane MD   Location: [facility]   Location Address: [address] [place] [address]  [id number]   Location Phone: [phone]          Chief Complaint  · Pt is here for urological concerns     Telephone Visit- presents for evaluation via telephone.   Verbal consent obtained before beginning visit.   The following staff were present during this visit: Yadira Soto LPN  Total time for encounter: 11 minutes       History Of Present Illness     68-year-old gentleman presents for further evaluation of elevated PSA.  Elevated PSA noted incidentally upon routine lab check by PCP.  Prior history of PSA elevation s/p negative biopsy by Dr. Rose in 2015.  History of BPH; takes alfuzosin.  He denies significant, bothersome lower urinary tract symptoms including urinary hesitancy, urgency, sensation of incomplete emptying, or nocturia. Denies h/o uti or prostatitis. Denies recent weight loss.  Denies new bony pain.    Update 10/1/2019: Patient presents for follow-up after multi-parametric MRI of prostate.  Patient denies changes since last visit.  No complaints today.  States his PSA has been high for years.  Denies bothersome voiding symptoms. Expects to have PSA screening per PCP on post per routine.    Update 11/17/20: Patient presents for routine annual follow-up with PSA prior.  Denies significant changes since last visit.  Complains of bothersome urinary urgency which has worsened since last visit.  Denies weak stream or sensation of incomplete emptying.  Continues to take alfuzosin without adverse side effect.    ____________  PSA trend  10/2020: 11.28  6/2019: 16.45  11/2017: 8.3  1/2017: 7.5  6/2016: 7.2  11/2015: 7.2  6/15:  7.2  3/2015: 7.8    Prostate biopsy: 6/18/2015-right and left negative for malignancy    MRI prostate 9/10/2019: 80 g prostate; PIRAD II x2 lesions; No suspicious lesions (PIRAD IV or V) for malignancy       Past Medical History  BPH (benign prostatic hyperplasia); Elevated prostate specific antigen (PSA); GERD; High blood pressure; High cholesterol         Past Surgical History  Colonoscopy; Hernia         Medication List  aspirin 81 mg oral tablet,delayed release (DR/EC); hydrochlorothiazide 12.5 mg oral tablet; Lotrel 5-20 mg oral capsule; omeprazole 20 mg oral capsule,delayed release(DR/EC); pravastatin 80 mg oral tablet; Uroxatral 10 mg oral tablet extended release 24 hr; Viagra 100 mg oral tablet         Allergy List  NO KNOWN DRUG ALLERGIES       Allergies Reconciled  Family Medical History  - No Family History of Colorectal Cancer; Lung cancer         Social History  Alcohol (Current some day); Tobacco (Former)         Review of Systems  · Constitutional  o Denies  o : chills, fever  · Gastrointestinal  o Denies  o : nausea, vomiting              Assessment  · Elevated Prostate Specific Antigen (PSA)     790.93/R97.2  · BPH (benign prostatic hyperplasia)     600.00/N40.0  · Lower urinary tract symptoms     788.99/R39.9  · Urinary urgency     788.63/R39.15    Problems Reconciled  Plan  · Orders  o Physician Telephone Evaluation, 11-20 minutes (84644) - 790.93/R97.2, 788.99/R39.9, 788.63/R39.15, 600.00/N40.0 - 11/17/2020  o PSA Ultrasensitive, ANNUAL SCREENING [facility] (13451, ) - 790.93/R97.2 - 11/17/2021  · Medications  o Medications have been Reconciled  o Transition of Care or Provider Policy  · Instructions  o Electronically Identified Patient Education Materials Provided Electronically     Elevated PSA-prior negative biopsy and prostate MRI without suspicious lesions; enlarged prostate gland.  PSA 11.2 from 16  Recommend continuing to trend with annual PSA; consideration of repeat prostate MRI if  persistently elevated   Patient notes understanding that whether or not further workup for prostate cancer is pursued,  a diagnosis of clinically significant prostate cancer would remains uncertain unless detected on biopsy. A negative biopsy, although reassuring, would not eliminate the possible need for further surveillance of PSA, possible future biopsy, and imaging as he may have undetected prostate cancer.     PSA in 1 year    BPH with LUTS, urinary urgencycontinue alfuzosin, trial of combination therapy via oxybutynin 10 mg extended release.  Discussed that studies have shown in combination therapy of BPH medication with OAB medication has been shown to improve overall symptoms.  Side effects of this medication discussed including constipation and dry mouth.  Patient agreeable to trial  Oxybutynin 10 mg ER sent to pharmacy    Follow-up phone visit in 2 months  All questions addressed               Electronically Signed by: Lashonda Lane MD -Author on November 17, 2020 02:36:08 PM

## 2021-05-14 VITALS
DIASTOLIC BLOOD PRESSURE: 78 MMHG | WEIGHT: 284.5 LBS | HEIGHT: 70 IN | TEMPERATURE: 98.1 F | RESPIRATION RATE: 16 BRPM | HEART RATE: 78 BPM | SYSTOLIC BLOOD PRESSURE: 146 MMHG | BODY MASS INDEX: 40.73 KG/M2 | OXYGEN SATURATION: 97 %

## 2021-05-15 VITALS
WEIGHT: 260.37 LBS | HEIGHT: 70 IN | RESPIRATION RATE: 14 BRPM | SYSTOLIC BLOOD PRESSURE: 118 MMHG | TEMPERATURE: 98.2 F | HEART RATE: 86 BPM | OXYGEN SATURATION: 96 % | DIASTOLIC BLOOD PRESSURE: 68 MMHG | BODY MASS INDEX: 37.27 KG/M2

## 2021-05-15 VITALS
BODY MASS INDEX: 38.22 KG/M2 | SYSTOLIC BLOOD PRESSURE: 140 MMHG | OXYGEN SATURATION: 97 % | WEIGHT: 267 LBS | DIASTOLIC BLOOD PRESSURE: 84 MMHG | HEIGHT: 70 IN | HEART RATE: 78 BPM | RESPIRATION RATE: 15 BRPM | TEMPERATURE: 97.9 F

## 2021-05-15 VITALS — HEIGHT: 70 IN | WEIGHT: 263 LBS | RESPIRATION RATE: 14 BRPM | BODY MASS INDEX: 37.65 KG/M2

## 2021-05-15 VITALS
TEMPERATURE: 98.2 F | OXYGEN SATURATION: 97 % | DIASTOLIC BLOOD PRESSURE: 82 MMHG | RESPIRATION RATE: 14 BRPM | HEIGHT: 70 IN | HEART RATE: 70 BPM | WEIGHT: 281 LBS | BODY MASS INDEX: 40.23 KG/M2 | SYSTOLIC BLOOD PRESSURE: 142 MMHG

## 2021-05-15 VITALS — RESPIRATION RATE: 14 BRPM | BODY MASS INDEX: 38.22 KG/M2 | WEIGHT: 267 LBS | HEIGHT: 70 IN

## 2021-05-15 VITALS
WEIGHT: 275.12 LBS | RESPIRATION RATE: 15 BRPM | BODY MASS INDEX: 39.39 KG/M2 | DIASTOLIC BLOOD PRESSURE: 82 MMHG | TEMPERATURE: 98.5 F | SYSTOLIC BLOOD PRESSURE: 158 MMHG | HEART RATE: 87 BPM | HEIGHT: 70 IN | OXYGEN SATURATION: 96 %

## 2021-05-16 VITALS
OXYGEN SATURATION: 96 % | WEIGHT: 272.25 LBS | TEMPERATURE: 97.4 F | HEART RATE: 78 BPM | BODY MASS INDEX: 38.98 KG/M2 | HEIGHT: 70 IN | SYSTOLIC BLOOD PRESSURE: 136 MMHG | RESPIRATION RATE: 15 BRPM | DIASTOLIC BLOOD PRESSURE: 86 MMHG

## 2021-05-28 VITALS
OXYGEN SATURATION: 97 % | TEMPERATURE: 99 F | HEIGHT: 68 IN | HEIGHT: 68 IN | DIASTOLIC BLOOD PRESSURE: 87 MMHG | WEIGHT: 280.2 LBS | TEMPERATURE: 97.2 F | BODY MASS INDEX: 42.67 KG/M2 | DIASTOLIC BLOOD PRESSURE: 76 MMHG | BODY MASS INDEX: 42.47 KG/M2 | SYSTOLIC BLOOD PRESSURE: 159 MMHG | RESPIRATION RATE: 20 BRPM | HEART RATE: 75 BPM | SYSTOLIC BLOOD PRESSURE: 151 MMHG | OXYGEN SATURATION: 95 % | HEART RATE: 83 BPM | WEIGHT: 281.53 LBS

## 2021-05-28 VITALS
HEART RATE: 74 BPM | RESPIRATION RATE: 18 BRPM | WEIGHT: 274.69 LBS | SYSTOLIC BLOOD PRESSURE: 150 MMHG | DIASTOLIC BLOOD PRESSURE: 78 MMHG | TEMPERATURE: 97.8 F | BODY MASS INDEX: 39.41 KG/M2 | OXYGEN SATURATION: 96 %

## 2021-05-28 NOTE — PROGRESS NOTES
Patient: GUTIERREZ ARROYO     Acct: [id number]     Report: #[id number]  UNIT #: [id number]     : 1950    Encounter Date:12/15/2020  PRIMARY CARE: ROBBIN ELIZABETH  ***Signed***  --------------------------------------------------------------------------------------------------------------------  NURSE INTAKE      Visit Type      Established Patient Visit            Chief Complaint      LEUKOCYTOSIS            Referring Provider/Copies To      Primary Care Provider:  ROBBIN ELIZABETH      Copies To:   ROBBIN ELIZABETH            History and Present Illness      Past History      CD5+ B-cell lymphoproliferative disorder:            The patient has had a mildly elevated white blood cell count for approximately 2    years.  He reports no significant symptoms.  He denies abdominal pain, cough,     fever, night sweats, or weight loss.  In fact he reports gaining weight.              On review of the patient's labs since 2020 his white blood cell count     has been elevated between 15-17,000.  On 2020 his absolute lymphocyte count    was also elevated at 9000.            3/24/2020: Flow cytometry shows CD5 positive B-cell lymphoproliferative     disorder.  There is a monoclonal B-cell population with kappa light chain     restriction.  The phenotype is not typical for CLL/SLL or mantle cell lymphoma.     Cytogenetic studies were normal.  Normal CLL FISH.            HPI - Hematology Interim      Patient comes in today for routine follow-up and repeat labs.  His labs on     2020 showed a WBC count of 15.6, hemoglobin 14.7, and platelet count 233.     His absolute lymphocyte count is stable at 8700.  It was previously 9220.  He     denies any changes in his health including weight loss, fevers, night sweats, or    adenopathy.            Most Recent Lab Findings      Laboratory Tests      20 10:41            20 10:49            PAST, FAMILY   Past Medical History      Past Medical History:  High  Cholesterol, High WBC Count      Hematology/Oncology (M):  Leukocytosis            Past Surgical History      Biopsy            UMBILICAL HERNIA            Family History      Family History:  Lung Cancer            Social History      Lives independently:  Yes      Number of Children:  0            Tobacco Use      Tobacco status:  Former smoker            Substance Use      Substance use:  Denies use            REVIEW OF SYSTEMS      General:  Denies: Appetite Change, Fatigue, Fever, Night Sweats, Weight Gain,     Weight Loss      Eye:  Admits Corrective Lenses; Denies Blurred Vision, Denies Diplopia, Denies     Vision Changes      ENT:  Denies Headache, Denies Hearing Loss, Denies Hoarseness, Denies Sore     Throat      Cardiovascular:  Denies Chest Pain, Denies Palpitations      Respiratory:  Denies: Cough, Coughing Blood, Productive Cough, Shortness of Air,    Wheezing      Gastrointestinal:  Denies Bloody Stools, Denies Constipation, Denies Diarrhea,     Denies Nausea/Vomiting, Denies Problem Swallowing, Denies Unable to Control     Bowels      Genitourinary:  Denies Blood in Urine, Denies Incontinence, Denies Painful     Urination      Musculoskeletal:  Denies Back Pain, Denies Muscle Pain, Denies Painful Joints      Integumentary:  Denies Itching, Denies Lesions, Denies Rash      Neurologic:  Denies Dizziness, Denies Numbness\Tingling, Denies Seizures      Psychiatric:  Denies Anxiety, Denies Depression      Endocrine:  Denies Cold Intolerance, Denies Heat Intolerance      Hematologic/Lymphatic:  Denies Bruising, Denies Bleeding, Denies Enlarged Lymph     Nodes            VITAL SIGNS AND SCORES      Vitals      Weight 274 lbs 11.091 oz / 124.6 kg      Temperature 97.8 F / 36.56 C - Temporal      Pulse 74      Respirations 18      Blood Pressure 150/78 Sitting, Left Arm      Pulse Oximetry 96%, RM AIR            Pain Score      Experiencing any pain?:  No      Pain Scale Used:  Numerical      Pain Intensity:   0            Fatigue Score      Experiencing any fatigue?:  No      Fatigue (0-10 scale):  0 (none)            EXAM      General: Alert, cooperative, no acute distress      Eyes: Anicteric sclera, PERRLA      Respiratory: CTAB, normal respiratory effort      Abdomen: Normal active bowel sounds, no tenderness, no distention      Cardiovascular: RRR, no murmur, no lower extremity edema      Skin: Normal tone, no rash, no lesions      Psychiatric: Appropriate affect, intact judgment      Neurologic: No focal sensory or motor deficits, no weakness, numbness, dizziness      Musculoskeletal: Normal muscle strength and tone      Extremities: No clubbing, cyanosis, or deformities      Lymphatics: No cervical or axillary adenopathy            PREVENTION      Hx Influenza Vaccination:  Yes      Date Influenza Vaccine Given:  Oct 1, 2020      Influenza Vaccine Declined:  No      2 or More Falls in Past Year?:  No      Fall Past Year with Injury?:  No      Hx Pneumococcal Vaccination:  Yes      Encouraged to follow-up with:  PCP regarding preventative exams.      Chart initiated by      MIKE BORDEN MA            ALLERGY/MEDS      Allergies      Coded Allergies:             NO KNOWN ALLERGIES (Unverified , 12/15/20)            Medications      Last Reconciled on 6/24/20 19:18 by MERT CANAS      Pravastatin Sodium (Pravastatin Sodium) 80 Mg Tablet      80 MG PO QDAY for 30 Days, #30 TAB         Reported         6/16/20       Sildenafil Citrate (Viagra) 100 Mg Tab      100 MG PO QDAY PRN PRN for ED, TAB         Reported         1/26/18       Alfuzosin HCl (Uroxatral) 10 Mg Tab.er.24h      10 MG PO QDAY for 30 Days, #30 TAB         Reported         1/26/18       Hctz (hydroCHLOROthiazide) 12.5 Mg Capsule      12.5 MG PO QDAY, #30 CAP 0 Refills         Reported         1/26/18       Aspirin Chew (Aspirin Baby) 81 Mg Tab.chew      81 MG PO QDAY, #30 TAB.CHEW 0 Refills         Reported         1/26/18       Omeprazole (priLOSEC) 20  Mg Capsule.      20 MG PO QDAY, #30 CAP 0 Refills         Reported         1/26/18       amLODIPine-Benazepril 5-20 Mg (amLODIPine-Benazepril 5-20 Mg) 1 Each Capsule      1 CAP PO QDAY, #30 CAP         Reported         1/26/18      Medications Reviewed:  No Changes made to meds            IMPRESSION/PLAN      Diagnosis      Leucocytosis - D72.829            Notes      New Diagnostics      * CBC With Auto Diff, 6 Months         Dx: Leucocytosis - D72.829      * Comp Metabolic Panel, 6 Months         Dx: Leucocytosis - D72.829            Plan      CD5 positive B-cell lymphoma: Likely CLL vs mantle cell lymphoma.  Patient's     counts have remained within normal limits with exception of elevated absolute     lymphocyte count.  He has no symptoms of systemic disease.  If he has any     changes in his counts I will send him for a bone marrow biopsy.  He will follow     up with me in 6 months with a repeat CBC but understands he should contact our     clinic sooner if there are any changes in his health.            Patient Education      Patient Education Provided:  Yes            Electronically signed by MERT CANAS  01/04/2021 00:17       Disclaimer: Converted document may not contain table formatting or lab diagrams. Please see [facility] for the authenticated document.

## 2021-05-28 NOTE — PROGRESS NOTES
Patient: GUTIERREZ ARROYO     Acct: [id number]     Report: #[id number]  UNIT #: [id number]     : 1950    Encounter Date:2020  PRIMARY CARE: ROBBIN ELIZABETH  ***Signed***  --------------------------------------------------------------------------------------------------------------------  NURSE INTAKE      Visit Type      Established Patient Visit            Chief Complaint      LEUKOCYTOSIS            Referring Provider/Copies To      Primary Care Provider:  ROBBIN ELIZABETH      Copies To:   ROBBIN ELIZABETH            History and Present Illness      Past History      Mr. Arroyo is a 69-year-old male who was referred to me for for leukocytosis     by YEFRI Powell at [facility] internal medicine and pediatrics.            The patient has had a mildly elevated white blood cell count for approximately 2    years.  He reports no significant symptoms.  He denies abdominal pain, cough,     fever, night sweats, or weight loss.  In fact he reports gaining weight.              On review of the patient's labs since 2020 his white blood cell count     has been elevated between 15-17,000.  On 2020 his absolute lymphocyte count    was also elevated at 9000.            3/24/2020: Flow cytometry shows CD5 positive B-cell lymphoproliferative     disorder.  There is a monoclonal B-cell population with kappa light chain     restriction.  The phenotype is not typical for CLL/SLL or mantle cell lymphoma.     Cytogenetic studies were normal.  Normal CLL FISH.            [facility] - Hematology Interim      Contacted the patient today regarding his recent labs.  During his last visit he    had flow cytometry which is concerning for a CD5 positive B-cell lymphoprolif    erative disorder.  This could be an atypical CLL but is not causing significant     abnormalities in the patient's cell counts.  His WBC count is 15.5, hemoglobin     14.1, platelet count 216.  Patient's only complaint is generalized fatigue.            Most  Recent Lab Findings      Laboratory Tests      6/16/20 08:33            PAST, FAMILY   Past Medical History      Past Medical History:  High Cholesterol, High WBC Count      Hematology/Oncology (M):  Leukocytosis            Past Surgical History      Biopsy (PROSTATE)            UMBILICAL HERNIA            Family History      Family History:  Lung Cancer (FATHER)            Social History      Marital Status:        Lives independently:  Yes      Number of Children:  0            Tobacco Use      Tobacco status:  Former smoker      Currently Vaping:  No      Smoking history:  10-25 pack years            Alcohol Use      Alcohol intake:  0-2 drinks per day            Substance Use      Substance use:  Denies use            REVIEW OF SYSTEMS      General:  Admits: Fatigue;          Denies: Appetite Change, Fever, Night Sweats, Weight Gain, Weight Loss      Eye:  Denies Blurred Vision, Denies Corrective Lenses, Denies Diplopia, Denies     Vision Changes      ENT:  Denies Headache, Denies Hearing Loss, Denies Hoarseness, Denies Sore     Throat      Cardiovascular:  Denies Chest Pain, Denies Palpitations      Respiratory:  Denies: Cough, Coughing Blood, Productive Cough, Shortness of Air,    Wheezing      Gastrointestinal:  Denies Bloody Stools, Denies Constipation, Denies Diarrhea,     Denies Nausea/Vomiting, Denies Problem Swallowing, Denies Unable to Control     Bowels      Genitourinary:  Denies Blood in Urine, Denies Incontinence, Denies Painful     Urination      Musculoskeletal:  Denies Back Pain, Denies Muscle Pain, Denies Painful Joints      Integumentary:  Denies Itching, Denies Lesions, Denies Rash      Neurologic:  Denies Dizziness, Denies Numbness\Tingling, Denies Seizures      Psychiatric:  Denies Anxiety, Denies Depression      Endocrine:  Denies Cold Intolerance, Denies Heat Intolerance      Hematologic/Lymphatic:  Denies Bruising, Denies Bleeding, Denies Enlarged Lymph     Nodes            VITAL  SIGNS AND SCORES      Vitals      Height 5 ft 7.91 in / 172.5 cm      Weight 281 lbs 8.439 oz / 127.7 kg      BSA 2.36 m2      BMI 42.9 kg/m2      Temperature 97.2 F / 36.22 C - Temporal      Pulse 75      Blood Pressure 159/87 Sitting, Left Arm      Pulse Oximetry 95%, ROOM AIR            Pain Score      Experiencing any pain?:  No      Pain Scale Used:  Numerical      Pain Intensity:  0            Fatigue Score      Experiencing any fatigue?:  Yes      Fatigue (0-10 scale):  5            EXAM      General: Alert, cooperative, no acute distress      Eyes: Anicteric sclera, PERRLA      HEENT: Oropharynx clear, no exudates      Respiratory: CTAB, normal respiratory effort      Abdomen: Normal active bowel sounds, no tenderness, no distention      Cardiovascular: RRR, no murmur, no peripheral edema      Skin: Normal tone, no rash, no lesions      Psychiatric: Appropriate affect, intact judgment      Neurologic: No focal sensory or motor deficits, no weakness, numbness, dizziness      Musculoskeletal: Normal muscle strength, normal muscle tone      Extremities: No clubbing, cyanosis, or deformities            PREVENTION      Hx Influenza Vaccination:  Yes      Date Influenza Vaccine Given:  Oct 2, 2019      Influenza Vaccine Declined:  No      2 or More Falls Past Year?:  No      Fall Past Year with Injury?:  No      Hx Pneumococcal Vaccination:  Yes      Encouraged to follow-up with:  PCP regarding preventative exams.      Chart initiated by      SUKHDEV LINO CMA            ALLERGY/MEDS      Allergies      Coded Allergies:             NO KNOWN ALLERGIES (Unverified , 6/16/20)            Medications      Last Reconciled on 6/24/20 19:18 by MERT CANAS      Pravastatin Sodium (Pravastatin Sodium) 80 Mg Tablet      80 MG PO QDAY for 30 Days, #30 TAB         Reported         6/16/20       Sildenafil Citrate (Viagra) 100 Mg Tab      100 MG PO QDAY PRN PRN for ED, TAB         Reported         1/26/18       Alfuzosin HCl  (Uroxatral) 10 Mg Tab.er.24h      10 MG PO QDAY for 30 Days, #30 TAB         Reported         1/26/18       Hctz (hydroCHLOROthiazide) 12.5 Mg Capsule      12.5 MG PO QDAY, #30 CAP 0 Refills         Reported         1/26/18       Aspirin Chew (Aspirin Baby) 81 Mg Tab.chew      81 MG PO QDAY, #30 TAB.CHEW 0 Refills         Reported         1/26/18       Omeprazole (priLOSEC) 20 Mg Capsule.dr      20 MG PO QDAY, #30 CAP 0 Refills         Reported         1/26/18       amLODIPine-Benazepril 5-20 Mg (amLODIPine-Benazepril 5-20 Mg) 1 Each Capsule      1 CAP PO QDAY, #30 CAP         Reported         1/26/18      Medications Reviewed:  Changes made to meds            IMPRESSION/PLAN      Impression      69-year-old male with leukocytosis likely secondary to CD5 positive B-cell     lymphoproliferative disorder.            Diagnosis      Leukocytosis - D72.829            Notes      New Medications      * Pravastatin Sodium 80 MG TABLET: 80 MG PO QDAY 30 Days #30         Replaced Pravastatin Sod (Pravachol*) 40 MG TABLET:         40 MG PO QDAY      New Diagnostics      * CBC With Auto Diff, 6 Months         Dx: Leukocytosis - D72.829      * CMP Comp Metabolic Panel, 6 Months         Dx: Leukocytosis - D72.829      * LDH, 6 Months         Dx: Leukocytosis - D72.829            Plan      CD5 positive B-cell lymphoma: Likely CLL versus mantle cell lymphoma.  No     indication for treatment at this time.  We will follow-up with the patient in 6     months with repeat CBC with differential.  If his counts worsen we will discuss     repeat flow cytometry versus bone marrow biopsy.            Electronically signed by MERT CANAS  06/24/2020 19:18       Disclaimer: Converted document may not contain table formatting or lab diagrams. Please see [facility] for the authenticated document.

## 2021-05-28 NOTE — PROGRESS NOTES
Patient: GUTIERREZ ARROYO     Acct: [id number]     Report: #[id number]  UNIT #: [id number]     : 1950    Encounter Date:2020  PRIMARY CARE: ROBBIN ELIZABETH  ***Signed***  --------------------------------------------------------------------------------------------------------------------  NURSE INTAKE      Visit Type      New Patient Visit            Chief Complaint      L;EUKOCYTOSIS            Referring Provider/Copies To      Primary Care Provider:  ROBBIN ELIZABETH      Copies To:   ROBBIN ELIZABETH            History and Present Illness      Past History      Mr. Arroyo is a 69-year-old male who was referred to me for for leukocytosis     by YEFRI Powell at [facility] internal medicine and pediatrics.            The patient has had a mildly elevated white blood cell count for approximately 2    years.  He reports no significant symptoms.  He denies abdominal pain, cough,     fever, night sweats, or weight loss.  In fact he reports gaining weight.              I reviewed the patient's most recent labs which show that his white blood cell     count has been elevated at 16.95 with an elevated absolute lymphocyte count over    10,000.  The rest of his differential is essentially normal.  He has a slight     microcytic anemia with a hemoglobin of 12.9 and MCV of 90.2.            PAST, FAMILY   Past Medical History      Past Medical History:  High Cholesterol, High WBC Count      Hematology/Oncology (M):  Leukocytosis            Past Surgical History      Biopsy (PROSTATE)            UMBILICAL HERNIA            Family History      Family History:  Lung Cancer (FATHER)            Social History      Marital Status:        Lives independently:  Yes      Number of Children:  0            Tobacco Use      Tobacco status:  Former smoker      Currently Vaping:  No      Smoking history:  10-25 pack years            Alcohol Use      Alcohol intake:  0-2 drinks per day            Substance Use      Substance  use:  Denies use            REVIEW OF SYSTEMS      General:  Denies: Appetite Change, Fatigue, Fever, Night Sweats, Weight Gain,     Weight Loss      Eye:  Denies Blurred Vision, Denies Corrective Lenses, Denies Diplopia, Denies     Vision Changes      ENT:  Denies Headache, Denies Hearing Loss, Denies Hoarseness, Denies Sore     Throat      Cardiovascular:  Denies Chest Pain, Denies Palpitations      Respiratory:  Denies: Coughing Blood, Productive Cough, Shortness of Air, Wheez    ing      Gastrointestinal:  Denies Bloody Stools, Denies Constipation, Denies Diarrhea,     Denies Nausea/Vomiting, Denies Problem Swallowing, Denies Unable to Control     Bowels      Genitourinary:  Denies Blood in Urine, Denies Incontinence, Denies Painful     Urination      Musculoskeletal:  Denies Back Pain, Denies Muscle Pain, Denies Painful Joints      Integumentary:  Denies Itching, Denies Lesions, Denies Rash      Neurologic:  Denies Dizziness, Denies Numbness\Tingling, Denies Seizures      Psychiatric:  Denies Anxiety, Denies Depression      Endocrine:  Denies Cold Intolerance, Denies Heat Intolerance      Hematologic/Lymphatic:  Denies Bruising, Denies Bleeding, Denies Enlarged Lymph     Nodes            VITAL SIGNS AND SCORES      Vitals      Height 5 ft 7.91 in / 172.5 cm      Weight 280 lbs 3.275 oz / 127.1 kg      BSA 2.36 m2      BMI 42.7 kg/m2      Temperature 99.0 F / 37.22 C - Temporal      Pulse 83      Respirations 20      Blood Pressure 151/76 Sitting, Right Arm      Pulse Oximetry 97%, ROOM AIR            Pain Score      Experiencing any pain?:  No      Pain Scale Used:  Numerical      Pain Intensity:  0            Fatigue Score      Experiencing any fatigue?:  No      Fatigue (0-10 scale):  0 (none)            EXAM      General: Alert, cooperative, no acute distress      Eyes: Anicteric sclera, PERRLA      HEENT: Oropharynx clear, no exudates      Respiratory: CTAB, normal respiratory effort      Abdomen: Normal  active bowel sounds, no tenderness, no distention      Cardiovascular: RRR, no murmur, no peripheral edema      Skin: Normal tone, no rash, no lesions      Psychiatric: Appropriate affect, intact judgment      Neurologic: No focal sensory or motor deficits, no weakness, numbness, dizziness      Musculoskeletal: Normal muscle strength, normal muscle tone      Extremities: No clubbing, cyanosis, or deformities            PREVENTION      Hx Influenza Vaccination:  Yes      Date Influenza Vaccine Given:  Oct 2, 2019      Hx Pneumococcal Vaccination:  Yes      Encouraged to follow-up with:  PCP regarding preventative exams.      Chart initiated by      DEVIN DELGADO [facility]            ALLERGY/MEDS      Allergies      Coded Allergies:             NO KNOWN ALLERGIES (Unverified , 3/24/20)            Medications      Last Reconciled on 4/3/20 14:03 by MERT CANAS      Sildenafil Citrate (Viagra) 100 Mg Tab      100 MG PO QDAY PRN PRN for ED, TAB         Reported         1/26/18       Alfuzosin HCl (Uroxatral) 10 Mg Tab.er.24h      10 MG PO QDAY for 30 Days, #30 TAB         Reported         1/26/18       Hctz (hydroCHLOROthiazide) 12.5 Mg Capsule      12.5 MG PO QDAY, #30 CAP 0 Refills         Reported         1/26/18       Aspirin Chew (Aspirin Baby) 81 Mg Tab.chew      81 MG PO QDAY, #30 TAB.CHEW 0 Refills         Reported         1/26/18       Pravastatin Sod (Pravachol*) 40 Mg Tablet      40 MG PO QDAY, TAB         Reported         1/26/18       Omeprazole (priLOSEC) 20 Mg Capsule.dr      20 MG PO QDAY, #30 CAP 0 Refills         Reported         1/26/18       amLODIPine-Benazepril 5-20 Mg (amLODIPine-Benazepril 5-20 Mg) 1 Each Capsule      1 CAP PO QDAY, #30 CAP         Reported         1/26/18      Medications Reviewed:  No Changes made to meds            IMPRESSION/PLAN      Impression      69-year-old male with persistent leukocytosis            Diagnosis      Abnormal CBC - R79.89            Elevated WBC count -  D72.829            Elevated lymphocyte count - D72.820            Refractory anemia, unspecified - D46.4            Notes      New Diagnostics      * CBC With Auto Diff, Routine         Dx: Abnormal CBC - R79.89      * Flow Cytometry Leukemia Lymph, Routine         Dx: Abnormal CBC - R79.89      * CMP Comp Metabolic Panel, Routine         Dx: Abnormal CBC - R79.89      * LDH, Routine         Dx: Abnormal CBC - R79.89      * CBC With Auto Diff, 3 Months         Dx: Abnormal CBC - R79.89      * CMP Comp Metabolic Panel, 3 Months         Dx: Abnormal CBC - R79.89      * LDH, 3 Months         Dx: Abnormal CBC - R79.89      * Cll Fish Panel, Routine         Dx: Abnormal CBC - R79.89            Plan      Leukocytosis: Patient has had a chronically elevated white blood cell count with    an absolute lymphocyte count greater than 10,000.  This is most consistent with     a diagnosis of CLL.  I will repeat the patient's CBC with differential today and    also check CMP and LDH.  I will send him for flow cytometry with FISH for CLL.      Considering he is asymptomatic there is no urgency to make this diagnosis.  For     this reason I will schedule his follow-up in 3 months if he can avoid contact     with the medical establishment as much as possible during the coronavirus     outbreak.  If he develops any significant symptoms he will call the clinic and     return sooner.            Patient Education      Patient Education Provided:  Yes            Electronically signed by MERT CANAS  04/03/2020 14:04       Disclaimer: Converted document may not contain table formatting or lab diagrams. Please see [employer] [facility] for the authenticated document.

## 2021-06-15 ENCOUNTER — OFFICE VISIT (OUTPATIENT)
Dept: ONCOLOGY | Facility: HOSPITAL | Age: 71
End: 2021-06-15

## 2021-06-15 VITALS
OXYGEN SATURATION: 97 % | DIASTOLIC BLOOD PRESSURE: 81 MMHG | BODY MASS INDEX: 39.89 KG/M2 | WEIGHT: 278 LBS | HEART RATE: 72 BPM | TEMPERATURE: 98.1 F | RESPIRATION RATE: 18 BRPM | SYSTOLIC BLOOD PRESSURE: 156 MMHG

## 2021-06-15 DIAGNOSIS — D72.829 LEUKOCYTOSIS, UNSPECIFIED TYPE: ICD-10-CM

## 2021-06-15 DIAGNOSIS — D47.Z9 LOW GRADE B CELL LYMPHOPROLIFERATIVE DISORDER (HCC): Primary | ICD-10-CM

## 2021-06-15 PROBLEM — E78.00 HIGH CHOLESTEROL: Status: ACTIVE | Noted: 2021-06-15

## 2021-06-15 PROBLEM — K21.9 ESOPHAGEAL REFLUX: Status: ACTIVE | Noted: 2021-06-15

## 2021-06-15 PROBLEM — N40.0 BPH (BENIGN PROSTATIC HYPERPLASIA): Status: ACTIVE | Noted: 2019-10-01

## 2021-06-15 PROBLEM — I10 HIGH BLOOD PRESSURE: Status: ACTIVE | Noted: 2021-06-15

## 2021-06-15 PROBLEM — R97.20 ELEVATED PROSTATE SPECIFIC ANTIGEN (PSA): Status: ACTIVE | Noted: 2021-06-15

## 2021-06-15 PROCEDURE — 99213 OFFICE O/P EST LOW 20 MIN: CPT | Performed by: INTERNAL MEDICINE

## 2021-06-15 PROCEDURE — G0463 HOSPITAL OUTPT CLINIC VISIT: HCPCS | Performed by: INTERNAL MEDICINE

## 2021-06-15 RX ORDER — HYDROCHLOROTHIAZIDE 12.5 MG/1
TABLET ORAL
COMMUNITY
Start: 2021-01-04 | End: 2021-09-27

## 2021-06-15 RX ORDER — ALFUZOSIN HYDROCHLORIDE 10 MG/1
TABLET, EXTENDED RELEASE ORAL
COMMUNITY
Start: 2021-05-17 | End: 2021-06-15 | Stop reason: SDUPTHER

## 2021-06-15 RX ORDER — OMEPRAZOLE 20 MG/1
CAPSULE, DELAYED RELEASE ORAL
COMMUNITY
Start: 2021-05-17 | End: 2021-09-27 | Stop reason: SDUPTHER

## 2021-06-15 RX ORDER — CIPROFLOXACIN 500 MG/1
TABLET, FILM COATED ORAL
COMMUNITY
End: 2021-09-27

## 2021-06-15 RX ORDER — PRAVASTATIN SODIUM 80 MG/1
TABLET ORAL
COMMUNITY
Start: 2021-01-04 | End: 2021-09-27 | Stop reason: SDUPTHER

## 2021-06-15 RX ORDER — HYDROCODONE BITARTRATE AND ACETAMINOPHEN 7.5; 325 MG/1; MG/1
TABLET ORAL
COMMUNITY
End: 2021-09-27

## 2021-06-15 RX ORDER — AMLODIPINE BESYLATE AND BENAZEPRIL HYDROCHLORIDE 5; 20 MG/1; MG/1
CAPSULE ORAL
COMMUNITY
Start: 2021-05-17 | End: 2021-06-15 | Stop reason: SDUPTHER

## 2021-06-15 RX ORDER — ASPIRIN 81 MG/1
TABLET ORAL
COMMUNITY
Start: 2021-01-04 | End: 2021-09-27 | Stop reason: SDUPTHER

## 2021-06-15 RX ORDER — SILDENAFIL 100 MG/1
TABLET, FILM COATED ORAL
COMMUNITY
Start: 2021-01-04 | End: 2021-06-15 | Stop reason: SDUPTHER

## 2021-06-15 RX ORDER — AMLODIPINE BESYLATE AND BENAZEPRIL HYDROCHLORIDE 10; 20 MG/1; MG/1
CAPSULE ORAL
COMMUNITY
End: 2021-06-15 | Stop reason: SDUPTHER

## 2021-06-15 NOTE — PROGRESS NOTES
Chief Complaint  Leukocytosis and Follow-up    Referring Provider: YEFRI Yung  PCP: Charmaine Pate APRN    Subjective          Oncology/Hematology History    No history exists.       Quique Valdez presents to [facility] for follow-up for CLL.  His labs were done recently at the [facility] clinic.  We were able to get those results prior to my visit with him today.  These show on 6-21 his WBC count was 18.63, hemoglobin 13.5, platelet count 218, absolute lymphocyte count 12,100.  This is not significantly different from his labs on 12/14/2021 with the exception of a one-point decrease in his hemoglobin.  At that time his hemoglobin was 14.7.  He has no concerns regarding weight loss, fevers, night sweats, or adenopathy.    History of Present Illness       Review of Systems   Constitutional: Positive for fatigue. Negative for appetite change, diaphoresis, fever, unexpected weight gain and unexpected weight loss.   HENT: Negative for hearing loss, sore throat and voice change.    Eyes: Negative for blurred vision, double vision, pain, redness and visual disturbance.   Respiratory: Negative for cough, shortness of breath and wheezing.    Cardiovascular: Negative for chest pain, palpitations and leg swelling.   Endocrine: Negative for cold intolerance, heat intolerance, polydipsia and polyuria.   Genitourinary: Negative for decreased urine volume, difficulty urinating, frequency and urinary incontinence.   Musculoskeletal: Negative for arthralgias, back pain, joint swelling and myalgias.   Skin: Negative for color change, rash, skin lesions and bruise.   Neurological: Negative for dizziness, seizures, numbness and headache.   Hematological: Negative for adenopathy. Does not bruise/bleed easily.   Psychiatric/Behavioral: Negative for depressed mood. The patient is not nervous/anxious.        Past Medical History:   Diagnosis Date   • Elevated WBC count    • High  cholesterol    • Hypertension    • Leukocytosis      Past Surgical History:   Procedure Laterality Date   • PROSTATE BIOPSY     • UMBILICAL HERNIA REPAIR       Social History     Socioeconomic History   • Marital status:      Spouse name: Not on file   • Number of children: 0   • Years of education: Not on file   • Highest education level: Not on file   Tobacco Use   • Smoking status: Former Smoker   • Smokeless tobacco: Never Used   • Tobacco comment: 10-25 PACK YEARS   Substance and Sexual Activity   • Alcohol use: Yes     Comment: 0-2 DRINKS/DAY   • Drug use: Not Currently     Family History   Problem Relation Age of Onset   • Lung cancer Other        Objective   Physical Exam  Vitals and nursing note reviewed.   Constitutional:       General: He is not in acute distress.     Appearance: Normal appearance.   HENT:      Mouth/Throat:      Mouth: Mucous membranes are moist.      Pharynx: Oropharynx is clear.   Eyes:      General: No scleral icterus.     Conjunctiva/sclera: Conjunctivae normal.   Cardiovascular:      Rate and Rhythm: Normal rate and regular rhythm.      Heart sounds: No murmur heard.   No gallop.    Pulmonary:      Effort: Pulmonary effort is normal.      Breath sounds: Normal breath sounds. No decreased breath sounds, wheezing, rhonchi or rales.   Abdominal:      General: Bowel sounds are normal. There is no distension.      Palpations: Abdomen is soft.      Tenderness: There is no abdominal tenderness. There is no guarding.   Musculoskeletal:         General: No swelling or signs of injury.      Right lower leg: No edema.      Left lower leg: No edema.   Skin:     General: Skin is warm and dry.      Coloration: Skin is not jaundiced or pale.      Findings: No lesion or rash.   Neurological:      General: No focal deficit present.      Mental Status: He is alert and oriented to person, place, and time. Mental status is at baseline.      Cranial Nerves: No cranial nerve deficit.      Motor:  No weakness.      Gait: Gait normal.      Deep Tendon Reflexes: Reflexes are normal and symmetric.   Psychiatric:         Attention and Perception: Attention normal.         Mood and Affect: Mood normal.         Behavior: Behavior normal. Behavior is cooperative.         Thought Content: Thought content normal.         Vitals:    06/15/21 0907   BP: 156/81  Comment: RIGHT ARM   Pulse: 72   Resp: 18   Temp: 98.1 °F (36.7 °C)   TempSrc: Temporal   SpO2: 97%  Comment: rm air   Weight: 126 kg (278 lb)   PainSc: 0-No pain     ECOG score: 0         PHQ-9 Total Score: 0                  Result Review :   The following data was reviewed by: Ijeoma Romero MD PhD on 06/15/2021:  Lab Results   Component Value Date    HGB 14.7 12/14/2020    HCT 46.1 12/14/2020    MCV 87.5 12/14/2020     04/02/2020    WBC 15.63 (H) 12/14/2020    NEUTROABS 5.90 12/14/2020    LYMPHSABS 8.71 (H) 12/14/2020    MONOSABS 0.79 12/14/2020    EOSABS 0.18 12/14/2020    BASOSABS 0.03 12/14/2020     Lab Results   Component Value Date    BUN 20 12/14/2020    CREATININE 1.09 12/14/2020     12/14/2020    K 4.4 12/14/2020     12/14/2020    CO2 30 12/14/2020    CALCIUM 9.2 12/14/2020    PROTEINTOT 6.3 12/14/2020    ALBUMIN 4.1 12/14/2020    BILITOT 0.63 12/14/2020    ALKPHOS 110 12/14/2020    AST 14 (L) 12/14/2020    ALT 19 12/14/2020          Assessment and Plan    Diagnoses and all orders for this visit:    1. 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  Patient's     counts have remained within normal limits with exception of elevated absolute     lymphocyte count.  He has no symptoms of systemic disease.  If he has any     changes in his  counts I will send him for a bone marrow biopsy.  He will follow     up with me in 4 months with a repeat CBC but understands he should contact our     clinic sooner if there are any changes in his health.            Patient was given instructions and counseling regarding his condition or for health maintenance advice. Please see specific information pulled into the AVS if appropriate.     Ijeoma Romero MD PhD    6/15/2021

## 2021-07-20 ENCOUNTER — TELEPHONE (OUTPATIENT)
Dept: INTERNAL MEDICINE | Facility: CLINIC | Age: 71
End: 2021-07-20

## 2021-07-20 DIAGNOSIS — E78.00 HIGH CHOLESTEROL: ICD-10-CM

## 2021-07-20 DIAGNOSIS — R97.20 ELEVATED PROSTATE SPECIFIC ANTIGEN (PSA): ICD-10-CM

## 2021-07-20 DIAGNOSIS — I10 HYPERTENSION, UNSPECIFIED TYPE: Primary | ICD-10-CM

## 2021-07-20 NOTE — TELEPHONE ENCOUNTER
Caller: Quique Valdez    Relationship: Self    Best call back number: [phone]    What is the best time to reach you: ANY    Who are you requesting to speak with (clinical staff, provider,  specific staff member): CLINICAL STAFF      What was the call regarding: PATIENT CALLED STATING HE NORMALLY GETS LABS DONE 2 WEEKS PRIOR FROM HIS APPOINTMENT SCHEDULED. HE IS WANTING TO KNOW IF ROBBIN ELIZABETH CAN ORDER HIM LABS TO GET THAT DONE 2 WEEKS BEFORE 09/27/2021 AND WOULD LIKE FOR IT TO BE ORDERED AT [facility]. PATIENT WOULD ALSO LIKE A CALL BACK. PLEASE ADVISE THANK YOU.     Do you require a callback: YES

## 2021-09-20 ENCOUNTER — LAB (OUTPATIENT)
Dept: LAB | Facility: HOSPITAL | Age: 71
End: 2021-09-20

## 2021-09-20 DIAGNOSIS — I10 HYPERTENSION, UNSPECIFIED TYPE: ICD-10-CM

## 2021-09-20 DIAGNOSIS — E78.00 HIGH CHOLESTEROL: ICD-10-CM

## 2021-09-20 DIAGNOSIS — R97.20 ELEVATED PROSTATE SPECIFIC ANTIGEN (PSA): ICD-10-CM

## 2021-09-20 LAB
ALBUMIN SERPL-MCNC: 4 G/DL (ref 3.5–5.2)
ALBUMIN/GLOB SERPL: 1.7 G/DL
ALP SERPL-CCNC: 112 U/L (ref 39–117)
ALT SERPL W P-5'-P-CCNC: 15 U/L (ref 1–41)
ANION GAP SERPL CALCULATED.3IONS-SCNC: 9.3 MMOL/L (ref 5–15)
AST SERPL-CCNC: 18 U/L (ref 1–40)
BASOPHILS # BLD AUTO: 0.04 10*3/MM3 (ref 0–0.2)
BASOPHILS NFR BLD AUTO: 0.4 % (ref 0–1.5)
BILIRUB SERPL-MCNC: 0.6 MG/DL (ref 0–1.2)
BUN SERPL-MCNC: 18 MG/DL (ref 8–23)
BUN/CREAT SERPL: 17.1 (ref 7–25)
CALCIUM SPEC-SCNC: 8.7 MG/DL (ref 8.6–10.5)
CHLORIDE SERPL-SCNC: 103 MMOL/L (ref 98–107)
CHOLEST SERPL-MCNC: 160 MG/DL (ref 0–200)
CO2 SERPL-SCNC: 27.7 MMOL/L (ref 22–29)
CREAT SERPL-MCNC: 1.05 MG/DL (ref 0.76–1.27)
DEPRECATED RDW RBC AUTO: 38.1 FL (ref 37–54)
EOSINOPHIL # BLD AUTO: 0.21 10*3/MM3 (ref 0–0.4)
EOSINOPHIL NFR BLD AUTO: 2.1 % (ref 0.3–6.2)
ERYTHROCYTE [DISTWIDTH] IN BLOOD BY AUTOMATED COUNT: 12.4 % (ref 12.3–15.4)
GFR SERPL CREATININE-BSD FRML MDRD: 70 ML/MIN/1.73
GLOBULIN UR ELPH-MCNC: 2.4 GM/DL
GLUCOSE SERPL-MCNC: 111 MG/DL (ref 65–99)
HCT VFR BLD AUTO: 40.8 % (ref 37.5–51)
HDLC SERPL-MCNC: 48 MG/DL (ref 40–60)
HGB BLD-MCNC: 13.2 G/DL (ref 13–17.7)
IMM GRANULOCYTES # BLD AUTO: 0.04 10*3/MM3 (ref 0–0.05)
IMM GRANULOCYTES NFR BLD AUTO: 0.4 % (ref 0–0.5)
LDLC SERPL CALC-MCNC: 99 MG/DL (ref 0–100)
LDLC/HDLC SERPL: 2.06 {RATIO}
LYMPHOCYTES # BLD AUTO: 4.61 10*3/MM3 (ref 0.7–3.1)
LYMPHOCYTES NFR BLD AUTO: 45.3 % (ref 19.6–45.3)
MCH RBC QN AUTO: 27.6 PG (ref 26.6–33)
MCHC RBC AUTO-ENTMCNC: 32.4 G/DL (ref 31.5–35.7)
MCV RBC AUTO: 85.4 FL (ref 79–97)
MONOCYTES # BLD AUTO: 1.46 10*3/MM3 (ref 0.1–0.9)
MONOCYTES NFR BLD AUTO: 14.4 % (ref 5–12)
NEUTROPHILS NFR BLD AUTO: 3.81 10*3/MM3 (ref 1.7–7)
NEUTROPHILS NFR BLD AUTO: 37.4 % (ref 42.7–76)
NRBC BLD AUTO-RTO: 0 /100 WBC (ref 0–0.2)
PLATELET # BLD AUTO: 213 10*3/MM3 (ref 140–450)
PMV BLD AUTO: 10.1 FL (ref 6–12)
POTASSIUM SERPL-SCNC: 4 MMOL/L (ref 3.5–5.2)
PROT SERPL-MCNC: 6.4 G/DL (ref 6–8.5)
PSA SERPL-MCNC: 11.5 NG/ML (ref 0–4)
RBC # BLD AUTO: 4.78 10*6/MM3 (ref 4.14–5.8)
SODIUM SERPL-SCNC: 140 MMOL/L (ref 136–145)
T4 FREE SERPL-MCNC: 1.16 NG/DL (ref 0.93–1.7)
TRIGL SERPL-MCNC: 65 MG/DL (ref 0–150)
TSH SERPL DL<=0.05 MIU/L-ACNC: 1.24 UIU/ML (ref 0.27–4.2)
VLDLC SERPL-MCNC: 13 MG/DL (ref 5–40)
WBC # BLD AUTO: 10.17 10*3/MM3 (ref 3.4–10.8)

## 2021-09-20 PROCEDURE — 85025 COMPLETE CBC W/AUTO DIFF WBC: CPT

## 2021-09-20 PROCEDURE — 84443 ASSAY THYROID STIM HORMONE: CPT

## 2021-09-20 PROCEDURE — 36415 COLL VENOUS BLD VENIPUNCTURE: CPT

## 2021-09-20 PROCEDURE — 84439 ASSAY OF FREE THYROXINE: CPT

## 2021-09-20 PROCEDURE — 80061 LIPID PANEL: CPT

## 2021-09-20 PROCEDURE — 84153 ASSAY OF PSA TOTAL: CPT

## 2021-09-20 PROCEDURE — 80053 COMPREHEN METABOLIC PANEL: CPT

## 2021-09-27 ENCOUNTER — OFFICE VISIT (OUTPATIENT)
Dept: INTERNAL MEDICINE | Facility: CLINIC | Age: 71
End: 2021-09-27

## 2021-09-27 VITALS
DIASTOLIC BLOOD PRESSURE: 72 MMHG | OXYGEN SATURATION: 95 % | WEIGHT: 272 LBS | HEART RATE: 99 BPM | SYSTOLIC BLOOD PRESSURE: 128 MMHG | HEIGHT: 70 IN | TEMPERATURE: 98.4 F | BODY MASS INDEX: 38.94 KG/M2

## 2021-09-27 DIAGNOSIS — K21.9 GASTROESOPHAGEAL REFLUX DISEASE, UNSPECIFIED WHETHER ESOPHAGITIS PRESENT: ICD-10-CM

## 2021-09-27 DIAGNOSIS — I10 ESSENTIAL HYPERTENSION: Primary | Chronic | ICD-10-CM

## 2021-09-27 DIAGNOSIS — E78.2 MIXED HYPERLIPIDEMIA: Chronic | ICD-10-CM

## 2021-09-27 DIAGNOSIS — R73.01 IMPAIRED FASTING GLUCOSE: ICD-10-CM

## 2021-09-27 PROBLEM — E78.5 HYPERLIPIDEMIA: Chronic | Status: ACTIVE | Noted: 2021-06-15

## 2021-09-27 PROBLEM — E78.00 HIGH CHOLESTEROL: Chronic | Status: ACTIVE | Noted: 2021-06-15

## 2021-09-27 PROCEDURE — 99214 OFFICE O/P EST MOD 30 MIN: CPT | Performed by: NURSE PRACTITIONER

## 2021-09-27 RX ORDER — PRAVASTATIN SODIUM 80 MG/1
40 TABLET ORAL NIGHTLY
Qty: 90 TABLET | Refills: 1 | Status: SHIPPED | OUTPATIENT
Start: 2021-09-27 | End: 2022-03-28 | Stop reason: SDUPTHER

## 2021-09-27 RX ORDER — SILDENAFIL 100 MG/1
100 TABLET, FILM COATED ORAL DAILY PRN
Qty: 8 TABLET | Refills: 1 | Status: SHIPPED | OUTPATIENT
Start: 2021-09-27 | End: 2022-05-25 | Stop reason: SDUPTHER

## 2021-09-27 RX ORDER — OMEPRAZOLE 20 MG/1
20 CAPSULE, DELAYED RELEASE ORAL DAILY
Qty: 90 CAPSULE | Refills: 1 | Status: SHIPPED | OUTPATIENT
Start: 2021-09-27 | End: 2022-05-25 | Stop reason: SDUPTHER

## 2021-09-27 RX ORDER — ALFUZOSIN HYDROCHLORIDE 10 MG/1
10 TABLET, EXTENDED RELEASE ORAL DAILY
COMMUNITY
End: 2021-09-27 | Stop reason: SDUPTHER

## 2021-09-27 RX ORDER — ALFUZOSIN HYDROCHLORIDE 10 MG/1
10 TABLET, EXTENDED RELEASE ORAL DAILY
Qty: 90 TABLET | Refills: 1 | Status: SHIPPED | OUTPATIENT
Start: 2021-09-27 | End: 2022-05-25 | Stop reason: SDUPTHER

## 2021-09-27 RX ORDER — HYDROCHLOROTHIAZIDE 12.5 MG/1
12.5 TABLET ORAL DAILY
Qty: 90 TABLET | Refills: 1 | Status: SHIPPED | OUTPATIENT
Start: 2021-09-27 | End: 2022-05-25 | Stop reason: SDUPTHER

## 2021-09-27 RX ORDER — SILDENAFIL 100 MG/1
100 TABLET, FILM COATED ORAL DAILY PRN
COMMUNITY
End: 2021-09-27 | Stop reason: SDUPTHER

## 2021-09-27 RX ORDER — AMLODIPINE BESYLATE AND BENAZEPRIL HYDROCHLORIDE 5; 10 MG/1; MG/1
1 CAPSULE ORAL DAILY
COMMUNITY
End: 2021-09-27 | Stop reason: SDUPTHER

## 2021-09-27 RX ORDER — ASPIRIN 81 MG/1
81 TABLET ORAL DAILY
Qty: 90 TABLET | Refills: 1 | Status: SHIPPED | OUTPATIENT
Start: 2021-09-27 | End: 2022-05-25 | Stop reason: SDUPTHER

## 2021-09-27 RX ORDER — AMLODIPINE BESYLATE AND BENAZEPRIL HYDROCHLORIDE 5; 10 MG/1; MG/1
1 CAPSULE ORAL DAILY
Qty: 90 CAPSULE | Refills: 1 | Status: SHIPPED | OUTPATIENT
Start: 2021-09-27 | End: 2022-03-28

## 2021-09-27 NOTE — ASSESSMENT & PLAN NOTE
A1c not drawn with recent labs.  Patient denies any worsening in dietary habits.  We will repeat A1c with labs in 6 months.

## 2021-09-27 NOTE — PROGRESS NOTES
"Chief Complaint  Follow-up, Hyperlipidemia, and Hypertension    Subjective          Quique Valdez presents to [facility]  History of Present Illness    HLD- no leg cramps  HTN-well controlled  Had covid about 1 mth ago--reports feeling very bad. Did not have to be hospitalized. Feels back to chencho at this point    Occasional paresthesias in toes for many months, intermittent. Denies pain    Objective   Vital Signs:   /72   Pulse 99   Temp 98.4 °F (36.9 °C) (Temporal)   Ht 177.8 cm (70\")   Wt 123 kg (272 lb)   SpO2 95%   BMI 39.03 kg/m²     Physical Exam  Vitals and nursing note reviewed.   Constitutional:       General: He is not in acute distress.     Appearance: Normal appearance.   HENT:      Head: Normocephalic and atraumatic.      Right Ear: External ear normal.      Left Ear: External ear normal.      Nose: Nose normal.      Mouth/Throat:      Mouth: Mucous membranes are moist.   Eyes:      Conjunctiva/sclera: Conjunctivae normal.   Cardiovascular:      Rate and Rhythm: Normal rate and regular rhythm.      Pulses: Normal pulses.      Heart sounds: Normal heart sounds. No murmur heard.   No friction rub. No gallop.    Pulmonary:      Effort: Pulmonary effort is normal. No respiratory distress.      Breath sounds: No wheezing, rhonchi or rales.   Musculoskeletal:      Cervical back: Neck supple.      Right lower leg: Edema (1+) present.      Left lower leg: Edema (1+) present.   Skin:     General: Skin is warm and dry.   Neurological:      General: No focal deficit present.      Mental Status: He is alert and oriented to person, place, and time.   Psychiatric:         Mood and Affect: Mood normal.         Behavior: Behavior normal.        Result Review :     CMP    CMP 10/9/20 12/14/20 9/20/21   Glucose   111 (A)   Glucose 86 117 (A)    BUN 21 20 18   Creatinine 1.19 1.09 1.05   eGFR Non African Am   70   Sodium 140 139 140   Potassium 4.1 4.4 4.0 "   Chloride 104 104 103   Calcium 9.5 9.2 8.7   Albumin 4.1 4.1 4.00   Total Bilirubin 0.75 0.63 0.6   Alkaline Phosphatase 110 110 112   AST (SGOT) 20 14 (A) 18   ALT (SGPT) 18 19 15   (A) Abnormal value       Comments are available for some flowsheets but are not being displayed.           CBC    CBC 12/14/20 12/14/20 9/20/21    1049 1049    WBC 15.63 (A)  10.17   RBC   4.78   Hemoglobin 14.7  13.2   Hematocrit 46.1  40.8   MCV 87.5  85.4   MCH 27.9  27.6   MCHC 31.9 (A)  32.4   RDW 41.7 12.9 12.4   Platelets   213   (A) Abnormal value            Lipid Panel    Lipid Panel 10/9/20 9/20/21   Total Cholesterol  160   Total Cholesterol 148    Triglycerides 75 65   HDL Cholesterol 46 48   VLDL Cholesterol 15 13   LDL Cholesterol  87 99   LDL/HDL Ratio  2.06      Comments are available for some flowsheets but are not being displayed.           TSH    TSH 9/20/21   TSH 1.240              Procedures      Assessment and Plan    Diagnoses and all orders for this visit:    1. Essential hypertension (Primary)  Assessment & Plan:  Controlled.  Continue current meds    Orders:  -     hydroCHLOROthiazide (HYDRODIURIL) 12.5 MG tablet; Take 1 tablet by mouth Daily.  Dispense: 90 tablet; Refill: 1  -     omeprazole (priLOSEC) 20 MG capsule; Take 1 capsule by mouth Daily.  Dispense: 90 capsule; Refill: 1  -     pravastatin (PRAVACHOL) 80 MG tablet; Take 0.5 tablets by mouth Every Night.  Dispense: 90 tablet; Refill: 1  -     amLODIPine-benazepril (LOTREL 5-10) 5-10 MG per capsule; Take 1 capsule by mouth Daily.  Dispense: 90 capsule; Refill: 1  -     aspirin (aspirin) 81 MG EC tablet; Take 1 tablet by mouth Daily.  Dispense: 90 tablet; Refill: 1  -     alfuzosin (UROXATRAL) 10 MG 24 hr tablet; Take 1 tablet by mouth Daily.  Dispense: 90 tablet; Refill: 1  -     sildenafil (VIAGRA) 100 MG tablet; Take 1 tablet by mouth Daily As Needed for Erectile Dysfunction.  Dispense: 8 tablet; Refill: 1  -     CBC & Differential; Future  -      Comprehensive Metabolic Panel; Future  -     Hemoglobin A1c; Future  -     Lipid Panel; Future  -     TSH; Future    2. Mixed hyperlipidemia  Assessment & Plan:  LDL not quite at goal.  Discussed goal of less than 70.  Continue current meds at this time.  He will continue to work on diet modification exercise as tolerated    Orders:  -     hydroCHLOROthiazide (HYDRODIURIL) 12.5 MG tablet; Take 1 tablet by mouth Daily.  Dispense: 90 tablet; Refill: 1  -     omeprazole (priLOSEC) 20 MG capsule; Take 1 capsule by mouth Daily.  Dispense: 90 capsule; Refill: 1  -     pravastatin (PRAVACHOL) 80 MG tablet; Take 0.5 tablets by mouth Every Night.  Dispense: 90 tablet; Refill: 1  -     amLODIPine-benazepril (LOTREL 5-10) 5-10 MG per capsule; Take 1 capsule by mouth Daily.  Dispense: 90 capsule; Refill: 1  -     aspirin (aspirin) 81 MG EC tablet; Take 1 tablet by mouth Daily.  Dispense: 90 tablet; Refill: 1  -     alfuzosin (UROXATRAL) 10 MG 24 hr tablet; Take 1 tablet by mouth Daily.  Dispense: 90 tablet; Refill: 1  -     sildenafil (VIAGRA) 100 MG tablet; Take 1 tablet by mouth Daily As Needed for Erectile Dysfunction.  Dispense: 8 tablet; Refill: 1  -     CBC & Differential; Future  -     Comprehensive Metabolic Panel; Future  -     Hemoglobin A1c; Future  -     Lipid Panel; Future  -     TSH; Future    3. Gastroesophageal reflux disease, unspecified whether esophagitis present  -     hydroCHLOROthiazide (HYDRODIURIL) 12.5 MG tablet; Take 1 tablet by mouth Daily.  Dispense: 90 tablet; Refill: 1  -     omeprazole (priLOSEC) 20 MG capsule; Take 1 capsule by mouth Daily.  Dispense: 90 capsule; Refill: 1  -     pravastatin (PRAVACHOL) 80 MG tablet; Take 0.5 tablets by mouth Every Night.  Dispense: 90 tablet; Refill: 1  -     amLODIPine-benazepril (LOTREL 5-10) 5-10 MG per capsule; Take 1 capsule by mouth Daily.  Dispense: 90 capsule; Refill: 1  -     aspirin (aspirin) 81 MG EC tablet; Take 1 tablet by mouth Daily.  Dispense: 90  tablet; Refill: 1  -     alfuzosin (UROXATRAL) 10 MG 24 hr tablet; Take 1 tablet by mouth Daily.  Dispense: 90 tablet; Refill: 1  -     sildenafil (VIAGRA) 100 MG tablet; Take 1 tablet by mouth Daily As Needed for Erectile Dysfunction.  Dispense: 8 tablet; Refill: 1  -     CBC & Differential; Future  -     Comprehensive Metabolic Panel; Future  -     Hemoglobin A1c; Future  -     Lipid Panel; Future  -     TSH; Future    4. Impaired fasting glucose  Assessment & Plan:  A1c not drawn with recent labs.  Patient denies any worsening in dietary habits.  We will repeat A1c with labs in 6 months.    Orders:  -     hydroCHLOROthiazide (HYDRODIURIL) 12.5 MG tablet; Take 1 tablet by mouth Daily.  Dispense: 90 tablet; Refill: 1  -     omeprazole (priLOSEC) 20 MG capsule; Take 1 capsule by mouth Daily.  Dispense: 90 capsule; Refill: 1  -     pravastatin (PRAVACHOL) 80 MG tablet; Take 0.5 tablets by mouth Every Night.  Dispense: 90 tablet; Refill: 1  -     amLODIPine-benazepril (LOTREL 5-10) 5-10 MG per capsule; Take 1 capsule by mouth Daily.  Dispense: 90 capsule; Refill: 1  -     aspirin (aspirin) 81 MG EC tablet; Take 1 tablet by mouth Daily.  Dispense: 90 tablet; Refill: 1  -     alfuzosin (UROXATRAL) 10 MG 24 hr tablet; Take 1 tablet by mouth Daily.  Dispense: 90 tablet; Refill: 1  -     sildenafil (VIAGRA) 100 MG tablet; Take 1 tablet by mouth Daily As Needed for Erectile Dysfunction.  Dispense: 8 tablet; Refill: 1  -     CBC & Differential; Future  -     Comprehensive Metabolic Panel; Future  -     Hemoglobin A1c; Future  -     Lipid Panel; Future  -     TSH; Future            Follow Up   Return in about 6 months (around 3/27/2022).  Patient was given instructions and counseling regarding his condition or for health maintenance advice. Please see specific information pulled into the AVS if appropriate.

## 2021-10-12 ENCOUNTER — APPOINTMENT (OUTPATIENT)
Dept: ONCOLOGY | Facility: HOSPITAL | Age: 71
End: 2021-10-12

## 2021-10-12 ENCOUNTER — OFFICE VISIT (OUTPATIENT)
Dept: ONCOLOGY | Facility: HOSPITAL | Age: 71
End: 2021-10-12

## 2021-10-12 VITALS
OXYGEN SATURATION: 95 % | HEART RATE: 80 BPM | SYSTOLIC BLOOD PRESSURE: 140 MMHG | WEIGHT: 268.3 LBS | RESPIRATION RATE: 18 BRPM | DIASTOLIC BLOOD PRESSURE: 72 MMHG | TEMPERATURE: 97.4 F | BODY MASS INDEX: 38.5 KG/M2

## 2021-10-12 DIAGNOSIS — D47.Z9 LOW GRADE B CELL LYMPHOPROLIFERATIVE DISORDER (HCC): Primary | ICD-10-CM

## 2021-10-12 DIAGNOSIS — D72.829 LEUKOCYTOSIS, UNSPECIFIED TYPE: ICD-10-CM

## 2021-10-12 PROCEDURE — G0463 HOSPITAL OUTPT CLINIC VISIT: HCPCS | Performed by: INTERNAL MEDICINE

## 2021-10-12 PROCEDURE — 99214 OFFICE O/P EST MOD 30 MIN: CPT | Performed by: INTERNAL MEDICINE

## 2021-10-12 NOTE — PROGRESS NOTES
Patient  Quique Valdez    [facility] HEMATOLOGY & ONCOLOG[facility]    Chief Complaint  Leukocytosis (-FU1)    Referring Provider: YEFRI Yung  PCP: Charmaine Pate APRN    Subjective          Oncology/Hematology History Overview Note     CD 5+ B-cell lymphoproliferative disorder:  - elevated lymphocyte count noted in January 2020.  - 3/24/2020: Flow cytometry shows CD5 positive B-cell lymphoproliferative  disorder.  There is a monoclonal B-cell population with kappa light chain     restriction.  The phenotype is not typical for CLL/SLL or mantle cell lymphoma. Cytogenetic studies were normal.  Normal CLL FISH.             History of Present Illness  Patient comes in today with no new complaints or concerns.  He denies any weight loss, fevers, night sweats, or adenopathy.  I reviewed his CBC and CMP which were done on 9/20/2021.  The CMP was essentially normal and a CBC was improved.  His WBC count is within normal limits now at 10,000.  His hemoglobin is 13.2 and platelet count 213.  His absolute lymphocyte count is only mildly elevated at 4610.  It was previously in the range of 8-10,000.  His absolute monocyte count is slightly elevated at 1460.    Review of Systems   Constitutional: Negative for appetite change, diaphoresis, fatigue, fever, unexpected weight gain and unexpected weight loss.   HENT: Negative for hearing loss, mouth sores, sore throat, swollen glands, trouble swallowing and voice change.    Eyes: Negative for blurred vision, double vision, pain, redness and visual disturbance.   Respiratory: Negative for cough, shortness of breath and wheezing.    Cardiovascular: Negative for chest pain, palpitations and leg swelling.   Gastrointestinal: Negative for abdominal pain, blood in stool, constipation, diarrhea, nausea and vomiting.   Endocrine: Negative for cold intolerance, heat intolerance, polydipsia and polyuria.   Genitourinary: Negative for decreased urine volume,  difficulty urinating, dysuria, frequency, hematuria and urinary incontinence.   Musculoskeletal: Negative for arthralgias, back pain, joint swelling and myalgias.   Skin: Negative for color change, rash, skin lesions and bruise.   Neurological: Negative for dizziness, seizures, weakness, numbness and headache.   Hematological: Negative for adenopathy. Does not bruise/bleed easily.   Psychiatric/Behavioral: Negative for depressed mood. The patient is not nervous/anxious.    All other systems reviewed and are negative.      Past Medical History:   Diagnosis Date   • Elevated WBC count    • High cholesterol    • Hypertension    • Leukocytosis      Past Surgical History:   Procedure Laterality Date   • PROSTATE BIOPSY     • UMBILICAL HERNIA REPAIR       Social History     Socioeconomic History   • Marital status:    • Number of children: 0   Tobacco Use   • Smoking status: Former Smoker   • Smokeless tobacco: Never Used   • Tobacco comment: 10-25 PACK YEARS   Vaping Use   • Vaping Use: Never used   Substance and Sexual Activity   • Alcohol use: Yes     Comment: 0-2 DRINKS/DAY   • Drug use: Not Currently     Family History   Problem Relation Age of Onset   • Lung cancer Other        Objective   Physical Exam  General: Alert, cooperative, no acute distress  Eyes: Anicteric sclera, PERRLA  Respiratory: normal respiratory effort  Cardiovascular: no lower extremity edema  Skin: Normal tone, no rash, no lesions  Psychiatric: Appropriate affect, intact judgment  Neurologic: No focal sensory or motor deficits, normal cognition   Musculoskeletal: Normal muscle strength and tone  Extremities: No clubbing, cyanosis, or deformities    Vitals:    10/12/21 1525   BP: 140/72   Pulse: 80   Resp: 18   Temp: 97.4 °F (36.3 °C)   SpO2: 95%   Weight: 122 kg (268 lb 4.8 oz)   PainSc: 0-No pain     ECOG score: 0         PHQ-9 Total Score:         Result Review :   The following data was reviewed by: Ijeoma Roemro MD PhD on  10/12/2021:  Lab Results   Component Value Date    HGB 13.2 09/20/2021    HCT 40.8 09/20/2021    MCV 85.4 09/20/2021     09/20/2021    WBC 10.17 09/20/2021    NEUTROABS 3.81 09/20/2021    LYMPHSABS 4.61 (H) 09/20/2021    MONOSABS 1.46 (H) 09/20/2021    EOSABS 0.21 09/20/2021    BASOSABS 0.04 09/20/2021     Lab Results   Component Value Date    GLUCOSE 111 (H) 09/20/2021    BUN 18 09/20/2021    CREATININE 1.05 09/20/2021     09/20/2021    K 4.0 09/20/2021     09/20/2021    CO2 27.7 09/20/2021    CALCIUM 8.7 09/20/2021    PROTEINTOT 6.4 09/20/2021    ALBUMIN 4.00 09/20/2021    BILITOT 0.6 09/20/2021    ALKPHOS 112 09/20/2021    AST 18 09/20/2021    ALT 15 09/20/2021          Assessment and Plan    Diagnoses and all orders for this visit:    1. 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 Leukocytosis, unspecified type  -     CBC & Differential; Future  -     Comprehensive Metabolic Panel; Future  -     Lactate Dehydrogenase; Future      Patient's labs have actually improved over the past 2 years.  He has no evidence of progressive lymphoma.  I will follow up with him in 6 months with a repeat CBC and LDH.    Patient was given instructions and counseling regarding his condition or for health maintenance advice. Please see specific information pulled into the AVS if appropriate.     Ijeoma Romero MD PhD    10/12/2021

## 2021-11-19 ENCOUNTER — TRANSCRIBE ORDERS (OUTPATIENT)
Dept: LAB | Facility: HOSPITAL | Age: 71
End: 2021-11-19

## 2021-11-19 DIAGNOSIS — R97.20 ELEVATED PROSTATE SPECIFIC ANTIGEN (PSA): Primary | ICD-10-CM

## 2021-11-24 ENCOUNTER — LAB (OUTPATIENT)
Dept: LAB | Facility: HOSPITAL | Age: 71
End: 2021-11-24

## 2021-11-24 ENCOUNTER — TELEPHONE (OUTPATIENT)
Dept: UROLOGY | Facility: CLINIC | Age: 71
End: 2021-11-24

## 2021-11-24 DIAGNOSIS — R97.20 ELEVATED PROSTATE SPECIFIC ANTIGEN (PSA): ICD-10-CM

## 2021-11-24 DIAGNOSIS — R97.20 ELEVATED PROSTATE SPECIFIC ANTIGEN (PSA): Primary | ICD-10-CM

## 2021-11-24 LAB — PSA SERPL-MCNC: 8.67 NG/ML (ref 0–4)

## 2021-11-24 PROCEDURE — 84153 ASSAY OF PSA TOTAL: CPT

## 2021-11-24 PROCEDURE — 36415 COLL VENOUS BLD VENIPUNCTURE: CPT

## 2021-12-01 ENCOUNTER — OFFICE VISIT (OUTPATIENT)
Dept: UROLOGY | Facility: CLINIC | Age: 71
End: 2021-12-01

## 2021-12-01 VITALS — HEIGHT: 70 IN | BODY MASS INDEX: 39.51 KG/M2 | WEIGHT: 276 LBS | RESPIRATION RATE: 16 BRPM

## 2021-12-01 DIAGNOSIS — R97.20 ELEVATED PROSTATE SPECIFIC ANTIGEN (PSA): Primary | ICD-10-CM

## 2021-12-01 DIAGNOSIS — R35.1 BENIGN PROSTATIC HYPERPLASIA WITH NOCTURIA: ICD-10-CM

## 2021-12-01 DIAGNOSIS — N40.1 BENIGN PROSTATIC HYPERPLASIA WITH NOCTURIA: ICD-10-CM

## 2021-12-01 LAB — URINE VOLUME: 0

## 2021-12-01 PROCEDURE — 99214 OFFICE O/P EST MOD 30 MIN: CPT | Performed by: UROLOGY

## 2021-12-01 RX ORDER — OXYBUTYNIN CHLORIDE 5 MG/1
10 TABLET, EXTENDED RELEASE ORAL DAILY
Qty: 90 TABLET | Refills: 0 | Status: SHIPPED | OUTPATIENT
Start: 2021-12-01 | End: 2022-05-25 | Stop reason: SDUPTHER

## 2021-12-01 NOTE — PROGRESS NOTES
"    UROLOGY OFFICE FOLLOW-UP NOTE    Subjective   HPI  Quique Valdez is a 71 y.o. male  presents for further evaluation of elevated PSA.  Elevated PSA noted incidentally upon routine lab check by PCP.  Prior history of PSA elevation s/p negative biopsy by Dr. Rose in 2015.  History of BPH; takes alfuzosin.  He denies significant, bothersome lower urinary tract symptoms including urinary hesitancy, urgency, sensation of incomplete emptying, or nocturia. Denies h/o uti or prostatitis. Denies recent weight loss.  Denies new bony pain.    Update 10/1/2019: Patient presents for follow-up after multi-parametric MRI of prostate.  Patient denies changes since last visit.  No complaints today.  States his PSA has been high for years.  Denies bothersome voiding symptoms. Expects to have PSA screening per PCP on post per routine.    Update 11/17/20: Patient presents for routine annual follow-up with PSA prior.  Denies significant changes since last visit.  Complains of bothersome urinary urgency which has worsened since last visit.  Denies weak stream or sensation of incomplete emptying.  Continues to take alfuzosin without adverse side effect.    Update 12/1/2021: Patient presents for routine annual follow-up with PSA prior. Complains of bothersome nocturia; 1-2 x; works 3rd shift; \"sleep messed up in general.\"  Uses CPAP. Able to go right back to sleep, but doesnt't sleep long period of time.  I discussed trial of medication at last visit but never received or started.  Wants to try an additional medication for his nocturia and urgency.  On alfuzosin.     ____________  PSA trend  11/24/21: 8.67  10/2020: 11.28  6/2019: 16.45  11/2017: 8.3  1/2017: 7.5  6/2016: 7.2  11/2015: 7.2  6/15: 7.2  3/2015: 7.8    Prostate biopsy: 6/18/2015-right and left negative for malignancy    MRI prostate 9/10/2019: 80 g prostate; PIRAD II x2 lesions; No suspicious lesions (PIRAD IV or V) for malignancy          Results for orders placed " or performed in visit on 12/01/21   Bladder Scan   Result Value Ref Range    Urine Volume 0          Medical History:  Past Medical History:   Diagnosis Date   • Elevated WBC count    • High cholesterol    • Hypertension    • Leukocytosis         Social History:  Social History     Socioeconomic History   • Marital status:    • Number of children: 0   Tobacco Use   • Smoking status: Former Smoker   • Smokeless tobacco: Never Used   • Tobacco comment: 10-25 PACK YEARS   Vaping Use   • Vaping Use: Never used   Substance and Sexual Activity   • Alcohol use: Yes     Comment: 0-2 DRINKS/DAY   • Drug use: Not Currently        Family History:  Family History   Problem Relation Age of Onset   • Lung cancer Other         Surgical History:  Past Surgical History:   Procedure Laterality Date   • PROSTATE BIOPSY     • UMBILICAL HERNIA REPAIR          Allergies:  No Known Allergies     Current Medications:  Current Outpatient Medications   Medication Sig Dispense Refill   • alfuzosin (UROXATRAL) 10 MG 24 hr tablet Take 1 tablet by mouth Daily. 90 tablet 1   • amLODIPine-benazepril (LOTREL 5-10) 5-10 MG per capsule Take 1 capsule by mouth Daily. 90 capsule 1   • aspirin (aspirin) 81 MG EC tablet Take 1 tablet by mouth Daily. 90 tablet 1   • diclofenac (VOLTAREN) 50 MG EC tablet Take 1 tablet by mouth 3 (Three) Times a Day. 30 tablet 0   • hydroCHLOROthiazide (HYDRODIURIL) 12.5 MG tablet Take 1 tablet by mouth Daily. 90 tablet 1   • methocarbamol (ROBAXIN) 750 MG tablet Take 1 tablet by mouth 4 (Four) Times a Day. 15 tablet 0   • omeprazole (priLOSEC) 20 MG capsule Take 1 capsule by mouth Daily. 90 capsule 1   • pravastatin (PRAVACHOL) 80 MG tablet Take 0.5 tablets by mouth Every Night. 90 tablet 1   • sildenafil (VIAGRA) 100 MG tablet Take 1 tablet by mouth Daily As Needed for Erectile Dysfunction. 8 tablet 1     No current facility-administered medications for this visit.       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 Elevated prostate specific antigen (PSA) (Primary)  -     Bladder Scan  -     PSA DIAGNOSTIC; Future    2. Benign prostatic hyperplasia with nocturia  -     oxybutynin XL (DITROPAN-XL) 5 MG 24 hr tablet; Take 2 tablets by mouth Daily.  Dispense: 90 tablet; Refill: 0           Elevated PSA-prior negative biopsy and prostate MRI without suspicious lesions; enlarged prostate gland.  PSA  8.67 from 11.2 from 16; reassuring trend  Recommend continuing to trend with annual PSA; consideration of repeat prostate MRI if persistently elevated   Patient notes understanding that whether or not further workup for prostate cancer is pursued,  a diagnosis of clinically significant prostate cancer would remains uncertain unless detected on biopsy. A negative biopsy, although reassuring, would not eliminate the possible need for further surveillance of PSA, possible future biopsy, and imaging as he may have undetected prostate cancer.     PSA in 1 year    BPH with LUTS, urinary urgencycontinue alfuzosin, trial of combination therapy via oxybutynin 5 mg extended release.  Discussed that studies have shown in combination therapy of BPH medication with OAB medication has been shown to improve overall symptoms.  Side effects of this medication discussed including constipation and dry mouth.  Patient " agreeable to trial  Oxybutynin 5 mg ER sent to pharmacy    Follow-up phone visit in 2 months  All questions addressed           Signed:  Lashonda Vargas MD  12/01/21  08:14 EST      MDM moderate: Two stable chronic illnesses; prescription drug management

## 2021-12-03 ENCOUNTER — OFFICE VISIT (OUTPATIENT)
Dept: SLEEP MEDICINE | Facility: HOSPITAL | Age: 71
End: 2021-12-03

## 2021-12-03 VITALS
BODY MASS INDEX: 41.52 KG/M2 | WEIGHT: 290 LBS | SYSTOLIC BLOOD PRESSURE: 161 MMHG | HEART RATE: 82 BPM | HEIGHT: 70 IN | OXYGEN SATURATION: 95 % | DIASTOLIC BLOOD PRESSURE: 86 MMHG | TEMPERATURE: 98 F

## 2021-12-03 DIAGNOSIS — G47.33 OSA (OBSTRUCTIVE SLEEP APNEA): Primary | ICD-10-CM

## 2021-12-03 PROCEDURE — G0463 HOSPITAL OUTPT CLINIC VISIT: HCPCS

## 2021-12-03 NOTE — PROGRESS NOTES
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    GOOD:    PSG on 9/27/18 showed AHI= 9.6/h. The patient was started on auto-CPAP therapy.      Sleep schedule:  -Bedtime: 8 AM  -Wake up time: 12 noon and sometimes he takes another nap in the afternoon , does feel refreshed      Mask: Nasal pillows which does fit well.  No significant air leak or dry mouth  DME: [facility]    ESS: Total score: 2     HTN:  He takes hydrochlorothiazide, Lotrel, and Alfuzosin. BP is controlled.  No side effects.    REVIEW OF SYSTEMS:   HEENT: Nasal congestion.  Postnasal drip.  No dry mouth.  CARDIOVASCULAR: No chest pain, chest pressure or chest discomfort. No palpitations or edema.   RESPIRATORY: Cough but no shortness of breath.  GASTROINTESTINAL: No abdominal bloating or reflux   NEUROLOGICAL/PSYCHOATRY: No depression nor anxiety    Past Medical History:  Past Medical History:   Diagnosis Date   • Elevated WBC count    • High cholesterol    • Hypertension    • Leukocytosis    ,   Past Surgical History:   Procedure Laterality Date   • PROSTATE BIOPSY     • UMBILICAL HERNIA REPAIR     ,   Social History     Socioeconomic History   • Marital status:    • Number of children: 0   Tobacco Use   • Smoking status: Former Smoker   • Smokeless tobacco: Never Used   • Tobacco comment: 10-25 PACK YEARS   Vaping Use   • Vaping Use: Never used   Substance and Sexual Activity   • Alcohol use: Yes     Comment: 0-2 DRINKS/DAY   • Drug use: Not Currently     E-cigarette/Vaping   • E-cigarette/Vaping Use Never User         and Allergies:  Patient has no known allergies.    Medication Review:     Current Outpatient Medications:   •  alfuzosin (UROXATRAL) 10 MG 24 hr tablet, Take 1 tablet by mouth Daily., Disp: 90 tablet, Rfl: 1  •   "amLODIPine-benazepril (LOTREL 5-10) 5-10 MG per capsule, Take 1 capsule by mouth Daily., Disp: 90 capsule, Rfl: 1  •  aspirin (aspirin) 81 MG EC tablet, Take 1 tablet by mouth Daily., Disp: 90 tablet, Rfl: 1  •  diclofenac (VOLTAREN) 50 MG EC tablet, Take 1 tablet by mouth 3 (Three) Times a Day., Disp: 30 tablet, Rfl: 0  •  hydroCHLOROthiazide (HYDRODIURIL) 12.5 MG tablet, Take 1 tablet by mouth Daily., Disp: 90 tablet, Rfl: 1  •  methocarbamol (ROBAXIN) 750 MG tablet, Take 1 tablet by mouth 4 (Four) Times a Day., Disp: 15 tablet, Rfl: 0  •  omeprazole (priLOSEC) 20 MG capsule, Take 1 capsule by mouth Daily., Disp: 90 capsule, Rfl: 1  •  oxybutynin XL (DITROPAN-XL) 5 MG 24 hr tablet, Take 2 tablets by mouth Daily., Disp: 90 tablet, Rfl: 0  •  pravastatin (PRAVACHOL) 80 MG tablet, Take 0.5 tablets by mouth Every Night., Disp: 90 tablet, Rfl: 1  •  sildenafil (VIAGRA) 100 MG tablet, Take 1 tablet by mouth Daily As Needed for Erectile Dysfunction., Disp: 8 tablet, Rfl: 1      Objective   Vital Signs:  Vitals:    12/03/21 0900   BP: 161/86   Pulse: 82   Temp: 98 °F (36.7 °C)   SpO2: 95%   Weight: 132 kg (290 lb)   Height: 177.8 cm (70\")     Body mass index is 41.61 kg/m².          Physical Exam:   General Appearance:    Alert, cooperative, in no acute distress   ENMT:  Freidman score 4, narrow distance in between the posterior pharyngeal pillars.   Neck:  Large. Trachea midline. No thyromegaly.   Lungs:     Clear to auscultation,respirations regular, even and                  unlabored    Heart:    Regular rhythm and normal rate, normal S1 and S2, no            Murmur.   Abdomen:     Obese.  Soft.  No tenderness.  No HSM    Neuro:   Conscious, alert, oriented x3. Appropriate mood and affect.    Extremities:   Moves all extremities well, no edema, no cyanosis, no             Redness              Diagnostic data:    CPAP download showed:  Date: Last 58 days  Usage (days): 98%  Days used>4h: 62%  AHI: 3/h  Leak: 1 min and " 54 seconds  Usage: 5 h and 41 min  P 90% : 7.7  Auto CPAP: 5-15 cm H2O    Labs 9/20/2021 reviewed:  Bicarb = 27; hemoglobin = 13.    Assessment   1. GOOD, on CPAP  2. Essential hypertension: Resistant hypertension  3. Morbid obesity, BMI 41        PLAN:    Discussed the result of the download.  Borderline compliant with therapy, mostly due to inadequate sleep hygiene and insufficient sleep time.  He is spending about 6 hours of usage on average but it is fluctuating (some days he uses it up to 12 hours and other days he uses 2 to 3 hours).  This is again mostly secondary to his sleep schedule.  He still benefits from therapy and he has no significant residual apnea.  Refill supplies.    Discussed sleep hygiene again and encouraged him to get at least 7-8 hours of sleep a day              This note was dictated utilizing Dragon dictation

## 2022-03-23 ENCOUNTER — LAB (OUTPATIENT)
Dept: LAB | Facility: HOSPITAL | Age: 72
End: 2022-03-23

## 2022-03-23 DIAGNOSIS — D72.829 LEUKOCYTOSIS, UNSPECIFIED TYPE: ICD-10-CM

## 2022-03-23 DIAGNOSIS — R73.01 IMPAIRED FASTING GLUCOSE: ICD-10-CM

## 2022-03-23 DIAGNOSIS — K21.9 GASTROESOPHAGEAL REFLUX DISEASE, UNSPECIFIED WHETHER ESOPHAGITIS PRESENT: ICD-10-CM

## 2022-03-23 DIAGNOSIS — E78.2 MIXED HYPERLIPIDEMIA: Chronic | ICD-10-CM

## 2022-03-23 DIAGNOSIS — I10 ESSENTIAL HYPERTENSION: Chronic | ICD-10-CM

## 2022-03-23 LAB
ALBUMIN SERPL-MCNC: 4.4 G/DL (ref 3.5–5.2)
ALBUMIN/GLOB SERPL: 2 G/DL
ALP SERPL-CCNC: 117 U/L (ref 39–117)
ALT SERPL W P-5'-P-CCNC: 23 U/L (ref 1–41)
ANION GAP SERPL CALCULATED.3IONS-SCNC: 9.3 MMOL/L (ref 5–15)
AST SERPL-CCNC: 19 U/L (ref 1–40)
BILIRUB SERPL-MCNC: 0.7 MG/DL (ref 0–1.2)
BUN SERPL-MCNC: 17 MG/DL (ref 8–23)
BUN/CREAT SERPL: 12.4 (ref 7–25)
CALCIUM SPEC-SCNC: 9.2 MG/DL (ref 8.6–10.5)
CHLORIDE SERPL-SCNC: 100 MMOL/L (ref 98–107)
CHOLEST SERPL-MCNC: 143 MG/DL (ref 0–200)
CO2 SERPL-SCNC: 27.7 MMOL/L (ref 22–29)
CREAT SERPL-MCNC: 1.37 MG/DL (ref 0.76–1.27)
DEPRECATED RDW RBC AUTO: 42.2 FL (ref 37–54)
EGFRCR SERPLBLD CKD-EPI 2021: 55.8 ML/MIN/1.73
ERYTHROCYTE [DISTWIDTH] IN BLOOD BY AUTOMATED COUNT: 13.1 % (ref 12.3–15.4)
GLOBULIN UR ELPH-MCNC: 2.2 GM/DL
GLUCOSE SERPL-MCNC: 118 MG/DL (ref 65–99)
HBA1C MFR BLD: 6.7 % (ref 4.8–5.6)
HCT VFR BLD AUTO: 43.5 % (ref 37.5–51)
HDLC SERPL-MCNC: 46 MG/DL (ref 40–60)
HGB BLD-MCNC: 14 G/DL (ref 13–17.7)
LDH SERPL-CCNC: 169 U/L (ref 135–225)
LDLC SERPL CALC-MCNC: 83 MG/DL (ref 0–100)
LDLC/HDLC SERPL: 1.81 {RATIO}
LYMPHOCYTES # BLD MANUAL: 12.48 10*3/MM3 (ref 0.7–3.1)
LYMPHOCYTES NFR BLD MANUAL: 15 % (ref 5–12)
MCH RBC QN AUTO: 28.3 PG (ref 26.6–33)
MCHC RBC AUTO-ENTMCNC: 32.2 G/DL (ref 31.5–35.7)
MCV RBC AUTO: 87.9 FL (ref 79–97)
MICROCYTES BLD QL: ABNORMAL
MONOCYTES # BLD: 3.02 10*3/MM3 (ref 0.1–0.9)
NEUTROPHILS # BLD AUTO: 4.63 10*3/MM3 (ref 1.7–7)
NEUTROPHILS NFR BLD MANUAL: 23 % (ref 42.7–76)
PLATELET # BLD AUTO: 215 10*3/MM3 (ref 140–450)
PMV BLD AUTO: 10.4 FL (ref 6–12)
POTASSIUM SERPL-SCNC: 3.8 MMOL/L (ref 3.5–5.2)
PROT SERPL-MCNC: 6.6 G/DL (ref 6–8.5)
RBC # BLD AUTO: 4.95 10*6/MM3 (ref 4.14–5.8)
SMALL PLATELETS BLD QL SMEAR: ADEQUATE
SODIUM SERPL-SCNC: 137 MMOL/L (ref 136–145)
TRIGL SERPL-MCNC: 68 MG/DL (ref 0–150)
TSH SERPL DL<=0.05 MIU/L-ACNC: 2.08 UIU/ML (ref 0.27–4.2)
VARIANT LYMPHS NFR BLD MANUAL: 54 % (ref 0–5)
VARIANT LYMPHS NFR BLD MANUAL: 8 % (ref 19.6–45.3)
VLDLC SERPL-MCNC: 14 MG/DL (ref 5–40)
WBC MORPH BLD: NORMAL
WBC NRBC COR # BLD: 20.13 10*3/MM3 (ref 3.4–10.8)

## 2022-03-23 PROCEDURE — 80061 LIPID PANEL: CPT

## 2022-03-23 PROCEDURE — 36415 COLL VENOUS BLD VENIPUNCTURE: CPT | Performed by: INTERNAL MEDICINE

## 2022-03-23 PROCEDURE — 85025 COMPLETE CBC W/AUTO DIFF WBC: CPT | Performed by: INTERNAL MEDICINE

## 2022-03-23 PROCEDURE — 84443 ASSAY THYROID STIM HORMONE: CPT

## 2022-03-23 PROCEDURE — 80053 COMPREHEN METABOLIC PANEL: CPT

## 2022-03-23 PROCEDURE — 83036 HEMOGLOBIN GLYCOSYLATED A1C: CPT

## 2022-03-23 PROCEDURE — 85007 BL SMEAR W/DIFF WBC COUNT: CPT | Performed by: INTERNAL MEDICINE

## 2022-03-23 PROCEDURE — 83615 LACTATE (LD) (LDH) ENZYME: CPT | Performed by: INTERNAL MEDICINE

## 2022-03-28 ENCOUNTER — OFFICE VISIT (OUTPATIENT)
Dept: INTERNAL MEDICINE | Facility: CLINIC | Age: 72
End: 2022-03-28

## 2022-03-28 VITALS
BODY MASS INDEX: 40.6 KG/M2 | HEART RATE: 87 BPM | HEIGHT: 70 IN | WEIGHT: 283.6 LBS | OXYGEN SATURATION: 97 % | SYSTOLIC BLOOD PRESSURE: 138 MMHG | RESPIRATION RATE: 18 BRPM | TEMPERATURE: 96.9 F | DIASTOLIC BLOOD PRESSURE: 78 MMHG

## 2022-03-28 DIAGNOSIS — K21.9 GASTROESOPHAGEAL REFLUX DISEASE, UNSPECIFIED WHETHER ESOPHAGITIS PRESENT: ICD-10-CM

## 2022-03-28 DIAGNOSIS — I10 PRIMARY HYPERTENSION: Primary | Chronic | ICD-10-CM

## 2022-03-28 DIAGNOSIS — E78.2 MIXED HYPERLIPIDEMIA: Chronic | ICD-10-CM

## 2022-03-28 DIAGNOSIS — E11.9 TYPE 2 DIABETES MELLITUS WITHOUT COMPLICATION, WITHOUT LONG-TERM CURRENT USE OF INSULIN: ICD-10-CM

## 2022-03-28 DIAGNOSIS — M54.30 SCIATIC LEG PAIN: ICD-10-CM

## 2022-03-28 PROBLEM — R73.01 IMPAIRED FASTING GLUCOSE: Chronic | Status: RESOLVED | Noted: 2021-09-27 | Resolved: 2022-03-28

## 2022-03-28 PROCEDURE — 99214 OFFICE O/P EST MOD 30 MIN: CPT | Performed by: NURSE PRACTITIONER

## 2022-03-28 RX ORDER — PRAVASTATIN SODIUM 40 MG
40 TABLET ORAL NIGHTLY
Qty: 90 TABLET | Refills: 3 | Status: SHIPPED | OUTPATIENT
Start: 2022-03-28 | End: 2022-05-25 | Stop reason: SDUPTHER

## 2022-03-28 RX ORDER — METHOCARBAMOL 750 MG/1
750 TABLET, FILM COATED ORAL 3 TIMES DAILY
Qty: 90 TABLET | Refills: 2 | Status: SHIPPED | OUTPATIENT
Start: 2022-03-28 | End: 2022-05-25 | Stop reason: SDUPTHER

## 2022-03-28 RX ORDER — AMLODIPINE BESYLATE AND BENAZEPRIL HYDROCHLORIDE 5; 20 MG/1; MG/1
1 CAPSULE ORAL DAILY
Qty: 90 CAPSULE | Refills: 3 | Status: SHIPPED | OUTPATIENT
Start: 2022-03-28 | End: 2022-05-25 | Stop reason: SDUPTHER

## 2022-03-28 NOTE — ASSESSMENT & PLAN NOTE
Discussed new diagnosis at length.  At this point he would like to work on exercise and modifying diet.  He will call with concerns for worsening diabetes.  We will repeat labs in 6 months.

## 2022-03-28 NOTE — PROGRESS NOTES
"Chief Complaint  Follow-up, Hypertension, Hyperlipidemia, Heartburn, and Back Pain (Lower Back Pain (sciatica left side) 1 Month)    Subjective     {Problem List  Visit Diagnosis   Encounters  Notes  Medications  Labs  Result Review Imaging  Media :23}     Quique Valdez presents to [facility]  History of Present Illness  He reports low back pain for years. He reports recently about 1 mth ago flared up on left side. He reports that pain has improved at this time. He does feel like the robaxin helps when this flares up    HLD-reports taking 40mg daily. Was given 80mg so has been splitting these in half    HTN-also reports that amlodipine-benazapril dose was also changed with the last refill. His bp has not been as good since being on lower dose. Denies cp, headache    DM- dx discussed at length given increase A1C from impaired fasting glucose   Objective   Vital Signs:   /78 (BP Location: Right arm, Patient Position: Sitting, Cuff Size: Adult)   Pulse 87   Temp 96.9 °F (36.1 °C)   Resp 18   Ht 177.8 cm (70\")   Wt 129 kg (283 lb 9.6 oz)   SpO2 97%   BMI 40.69 kg/m²     Physical Exam  Vitals and nursing note reviewed.   Constitutional:       General: He is not in acute distress.     Appearance: Normal appearance.   HENT:      Head: Normocephalic and atraumatic.      Right Ear: External ear normal.      Left Ear: External ear normal.      Nose: Nose normal.      Mouth/Throat:      Mouth: Mucous membranes are moist.   Eyes:      Conjunctiva/sclera: Conjunctivae normal.   Cardiovascular:      Rate and Rhythm: Normal rate and regular rhythm.      Pulses: Normal pulses.      Heart sounds: Normal heart sounds. No murmur heard.    No friction rub. No gallop.   Pulmonary:      Effort: Pulmonary effort is normal. No respiratory distress.      Breath sounds: No wheezing, rhonchi or rales.   Musculoskeletal:      Cervical back: Neck supple.      Right lower " leg: No edema.      Left lower leg: No edema.   Lymphadenopathy:      Cervical: No cervical adenopathy.   Skin:     General: Skin is warm and dry.   Neurological:      General: No focal deficit present.      Mental Status: He is alert and oriented to person, place, and time.   Psychiatric:         Mood and Affect: Mood normal.         Behavior: Behavior normal.        Result Review :          Procedures      Assessment and Plan    Diagnoses and all orders for this visit:    1. Primary hypertension (Primary)  Comments:  Increasing dose back to amlodipine benazepril 5-20.  He will continue to monitor blood pressures at home and call with concerns.    2. Mixed hyperlipidemia  Comments:  Well-controlled.  Continue current meds.  We will continue with 40 mg dose.  Orders:  -     pravastatin (PRAVACHOL) 40 MG tablet; Take 1 tablet by mouth Every Night.  Dispense: 90 tablet; Refill: 3    3. Sciatic leg pain  Comments:  Improved at this time.  Refilling Robaxin to use if his back flares up again.  Orders:  -     methocarbamol (ROBAXIN) 750 MG tablet; Take 1 tablet by mouth 3 (Three) Times a Day.  Dispense: 90 tablet; Refill: 2    4. Gastroesophageal reflux disease, unspecified whether esophagitis present  Comments:  Well-controlled.  Continue omeprazole  Orders:  -     pravastatin (PRAVACHOL) 40 MG tablet; Take 1 tablet by mouth Every Night.  Dispense: 90 tablet; Refill: 3    5. Type 2 diabetes mellitus without complication, without long-term current use of insulin ([facility])  Assessment & Plan:  Discussed new diagnosis at length.  At this point he would like to work on exercise and modifying diet.  He will call with concerns for worsening diabetes.  We will repeat labs in 6 months.    Orders:  -     CBC & Differential; Future  -     Comprehensive Metabolic Panel; Future  -     Hemoglobin A1c; Future  -     Lipid Panel; Future  -     TSH; Future    Other orders  -     amLODIPine-benazepril (LOTREL 5-20) 5-20 MG per capsule; Take  1 capsule by mouth Daily.  Dispense: 90 capsule; Refill: 3            Follow Up   Return in about 6 months (around 9/28/2022).  Patient was given instructions and counseling regarding his condition or for health maintenance advice. Please see specific information pulled into the AVS if appropriate.

## 2022-04-06 ENCOUNTER — TELEPHONE (OUTPATIENT)
Dept: UROLOGY | Facility: CLINIC | Age: 72
End: 2022-04-06

## 2022-04-11 ENCOUNTER — OFFICE VISIT (OUTPATIENT)
Dept: ONCOLOGY | Facility: HOSPITAL | Age: 72
End: 2022-04-11

## 2022-04-11 VITALS
HEART RATE: 68 BPM | WEIGHT: 280.87 LBS | TEMPERATURE: 98.1 F | DIASTOLIC BLOOD PRESSURE: 75 MMHG | BODY MASS INDEX: 40.3 KG/M2 | OXYGEN SATURATION: 96 % | SYSTOLIC BLOOD PRESSURE: 165 MMHG | RESPIRATION RATE: 16 BRPM

## 2022-04-11 DIAGNOSIS — D47.Z9 LOW GRADE B CELL LYMPHOPROLIFERATIVE DISORDER: Primary | ICD-10-CM

## 2022-04-11 DIAGNOSIS — D72.829 LEUKOCYTOSIS, UNSPECIFIED TYPE: ICD-10-CM

## 2022-04-11 PROCEDURE — G0463 HOSPITAL OUTPT CLINIC VISIT: HCPCS | Performed by: INTERNAL MEDICINE

## 2022-04-11 PROCEDURE — 99213 OFFICE O/P EST LOW 20 MIN: CPT | Performed by: INTERNAL MEDICINE

## 2022-04-11 NOTE — PROGRESS NOTES
Patient  Quique Valdez    [facility]    Chief Complaint  Leukocytosis    Referring Provider: YEFRI Yung  PCP: Charmaine Pate APRN    Subjective          Oncology/Hematology History Overview Note     CD 5+ B-cell lymphoproliferative disorder:  - elevated lymphocyte count noted in January 2020.  - 3/24/2020: Flow cytometry shows CD5 positive B-cell lymphoproliferative  disorder.  There is a monoclonal B-cell population with kappa light chain     restriction.  The phenotype is not typical for CLL/SLL or mantle cell lymphoma. Cytogenetic studies were normal.  Normal CLL FISH.             HPI  Patient comes in today for 6-month follow-up regarding his diagnosis of CLL.  He had labs done on 3/23/2022 per his primary care provider.  I reviewed his CBC from that day which shows that his white count has increased to 20,000 with an absolute lymphocyte count of 12,000 but his other counts including his hemoglobin and platelet count remain within normal levels.  His hemoglobin is 14.0 and his platelet count is 215.  He denies any new health problems or symptoms.  Specifically he denies the symptoms such as weight loss, fever, or night sweats.    Review of Systems   Constitutional: Positive for fatigue. Negative for appetite change, diaphoresis, fever, unexpected weight gain and unexpected weight loss.   HENT: Negative for hearing loss, sore throat and voice change.    Eyes: Negative for blurred vision, double vision, pain, redness and visual disturbance.   Respiratory: Negative for cough, shortness of breath and wheezing.    Cardiovascular: Negative for chest pain, palpitations and leg swelling.   Endocrine: Negative for cold intolerance, heat intolerance, polydipsia and polyuria.   Genitourinary: Negative for decreased urine volume, difficulty urinating, frequency and urinary incontinence.   Musculoskeletal: Positive for back pain. Negative for arthralgias, joint  swelling and myalgias.   Skin: Negative for color change, rash, skin lesions and wound.   Neurological: Negative for dizziness, seizures, numbness and headache.   Hematological: Negative for adenopathy. Does not bruise/bleed easily.   Psychiatric/Behavioral: Negative for depressed mood. The patient is not nervous/anxious.    All other systems reviewed and are negative.      Past Medical History:   Diagnosis Date   • Elevated WBC count    • High cholesterol    • Hypertension    • Leukocytosis      Past Surgical History:   Procedure Laterality Date   • PROSTATE BIOPSY     • UMBILICAL HERNIA REPAIR       Social History     Socioeconomic History   • Marital status:    • Number of children: 0   Tobacco Use   • Smoking status: Former Smoker   • Smokeless tobacco: Never Used   • Tobacco comment: 10-25 PACK YEARS   Vaping Use   • Vaping Use: Never used   Substance and Sexual Activity   • Alcohol use: Yes     Comment: 0-2 DRINKS/DAY   • Drug use: Not Currently   • Sexual activity: Defer     Family History   Problem Relation Age of Onset   • Lung cancer Other        Objective   Physical Exam  General: Alert, cooperative, no acute distress well-appearing  Neck: No cervical adenopathy  Eyes: Anicteric sclera, PERRLA  Respiratory: normal respiratory effort  Cardiovascular: no lower extremity edema  Skin: Normal tone, no rash, no lesions  Psychiatric: Appropriate affect, intact judgment  Neurologic: No focal sensory or motor deficits, normal cognition   Musculoskeletal: Normal muscle strength and tone  Extremities: No clubbing, cyanosis, or deformities    Vitals:    04/11/22 0839   BP: 165/75   Pulse: 68   Resp: 16   Temp: 98.1 °F (36.7 °C)   SpO2: 96%   Weight: 127 kg (280 lb 13.9 oz)   PainSc: 0-No pain               PHQ-9 Total Score:         Result Review :   The following data was reviewed by: Ijeoma Romero MD PhD on 04/11/2022:  Lab Results   Component Value Date    HGB 14.0 03/23/2022    HCT 43.5 03/23/2022     MCV 87.9 03/23/2022     03/23/2022    WBC 20.13 (H) 03/23/2022    NEUTROABS 4.63 03/23/2022    LYMPHSABS 4.61 (H) 09/20/2021    MONOSABS 1.46 (H) 09/20/2021    EOSABS 0.21 09/20/2021    BASOSABS 0.04 09/20/2021     Lab Results   Component Value Date    GLUCOSE 118 (H) 03/23/2022    BUN 17 03/23/2022    CREATININE 1.37 (H) 03/23/2022     03/23/2022    K 3.8 03/23/2022     03/23/2022    CO2 27.7 03/23/2022    CALCIUM 9.2 03/23/2022    PROTEINTOT 6.6 03/23/2022    ALBUMIN 4.40 03/23/2022    BILITOT 0.7 03/23/2022    ALKPHOS 117 03/23/2022    AST 19 03/23/2022    ALT 23 03/23/2022          Assessment and Plan {CC Problem List  Visit Diagnosis   ROS  Review (Popup)  Health Maintenance  Quality  BestPractice  Medications  SmartSets  SnapShot Encounters  Media :23}   Diagnoses and all orders for this visit:    1. 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 Leukocytosis, unspecified type  -     CBC & Differential; Future  -     Comprehensive Metabolic Panel; Future  -     Lactate Dehydrogenase; Future      CD5+ B-cell lymphoproliferative disorder:  The patient has a slight increase in his WBC count but no other abnormalities on CBC.  He denies any new symptoms. He will f/u with me in 6 months with repeat CBC.       Patient was given instructions and counseling regarding his condition or for health maintenance advice. Please see specific information pulled into the AVS if appropriate.

## 2022-05-25 DIAGNOSIS — N40.1 BENIGN PROSTATIC HYPERPLASIA WITH NOCTURIA: ICD-10-CM

## 2022-05-25 DIAGNOSIS — M54.30 SCIATIC LEG PAIN: ICD-10-CM

## 2022-05-25 DIAGNOSIS — I10 ESSENTIAL HYPERTENSION: Chronic | ICD-10-CM

## 2022-05-25 DIAGNOSIS — R35.1 BENIGN PROSTATIC HYPERPLASIA WITH NOCTURIA: ICD-10-CM

## 2022-05-25 DIAGNOSIS — K21.9 GASTROESOPHAGEAL REFLUX DISEASE, UNSPECIFIED WHETHER ESOPHAGITIS PRESENT: ICD-10-CM

## 2022-05-25 DIAGNOSIS — R73.01 IMPAIRED FASTING GLUCOSE: ICD-10-CM

## 2022-05-25 DIAGNOSIS — E78.2 MIXED HYPERLIPIDEMIA: Chronic | ICD-10-CM

## 2022-05-25 RX ORDER — ASPIRIN 81 MG/1
81 TABLET ORAL DAILY
Qty: 90 TABLET | Refills: 1 | Status: ON HOLD | OUTPATIENT
Start: 2022-05-25 | End: 2023-01-13 | Stop reason: SDUPTHER

## 2022-05-25 RX ORDER — HYDROCHLOROTHIAZIDE 12.5 MG/1
12.5 TABLET ORAL DAILY
Qty: 90 TABLET | Refills: 1 | Status: SHIPPED | OUTPATIENT
Start: 2022-05-25 | End: 2023-02-01 | Stop reason: SDUPTHER

## 2022-05-25 RX ORDER — OMEPRAZOLE 20 MG/1
20 CAPSULE, DELAYED RELEASE ORAL DAILY
Qty: 90 CAPSULE | Refills: 1 | Status: SHIPPED | OUTPATIENT
Start: 2022-05-25 | End: 2023-02-01 | Stop reason: SDUPTHER

## 2022-05-25 RX ORDER — AMLODIPINE BESYLATE AND BENAZEPRIL HYDROCHLORIDE 5; 20 MG/1; MG/1
1 CAPSULE ORAL DAILY
Qty: 90 CAPSULE | Refills: 1 | Status: SHIPPED | OUTPATIENT
Start: 2022-05-25 | End: 2023-02-01 | Stop reason: SDUPTHER

## 2022-05-25 RX ORDER — PRAVASTATIN SODIUM 40 MG
40 TABLET ORAL NIGHTLY
Qty: 90 TABLET | Refills: 1 | Status: SHIPPED | OUTPATIENT
Start: 2022-05-25 | End: 2023-02-01 | Stop reason: SDUPTHER

## 2022-05-25 RX ORDER — SILDENAFIL 100 MG/1
100 TABLET, FILM COATED ORAL DAILY PRN
Qty: 8 TABLET | Refills: 1 | Status: SHIPPED | OUTPATIENT
Start: 2022-05-25 | End: 2023-02-01 | Stop reason: SDUPTHER

## 2022-05-25 RX ORDER — OXYBUTYNIN CHLORIDE 5 MG/1
10 TABLET, EXTENDED RELEASE ORAL DAILY
Qty: 90 TABLET | Refills: 1 | Status: SHIPPED | OUTPATIENT
Start: 2022-05-25 | End: 2023-02-01 | Stop reason: SDUPTHER

## 2022-05-25 RX ORDER — METHOCARBAMOL 750 MG/1
750 TABLET, FILM COATED ORAL 3 TIMES DAILY
Qty: 90 TABLET | Refills: 3 | Status: SHIPPED | OUTPATIENT
Start: 2022-05-25

## 2022-05-25 RX ORDER — ALFUZOSIN HYDROCHLORIDE 10 MG/1
10 TABLET, EXTENDED RELEASE ORAL DAILY
Qty: 90 TABLET | Refills: 1 | Status: SHIPPED | OUTPATIENT
Start: 2022-05-25 | End: 2022-11-30

## 2022-05-31 ENCOUNTER — OFFICE VISIT (OUTPATIENT)
Dept: UROLOGY | Facility: CLINIC | Age: 72
End: 2022-05-31

## 2022-05-31 VITALS — HEIGHT: 70 IN | BODY MASS INDEX: 40.14 KG/M2 | WEIGHT: 280.4 LBS | RESPIRATION RATE: 16 BRPM

## 2022-05-31 DIAGNOSIS — R39.15 BENIGN PROSTATIC HYPERPLASIA (BPH) WITH URINARY URGENCY: Primary | ICD-10-CM

## 2022-05-31 DIAGNOSIS — N47.1 PHIMOSIS: ICD-10-CM

## 2022-05-31 DIAGNOSIS — N40.1 BENIGN PROSTATIC HYPERPLASIA (BPH) WITH URINARY URGENCY: Primary | ICD-10-CM

## 2022-05-31 DIAGNOSIS — R97.20 ELEVATED PROSTATE SPECIFIC ANTIGEN (PSA): ICD-10-CM

## 2022-05-31 LAB — URINE VOLUME: 115

## 2022-05-31 PROCEDURE — 51798 US URINE CAPACITY MEASURE: CPT | Performed by: UROLOGY

## 2022-05-31 PROCEDURE — 99214 OFFICE O/P EST MOD 30 MIN: CPT | Performed by: UROLOGY

## 2022-05-31 RX ORDER — NYSTATIN AND TRIAMCINOLONE ACETONIDE 100000; 1 [USP'U]/G; MG/G
OINTMENT TOPICAL
Qty: 30 G | Refills: 2 | OUTPATIENT
Start: 2022-05-31 | End: 2023-01-12

## 2022-05-31 RX ORDER — FINASTERIDE 5 MG/1
5 TABLET, FILM COATED ORAL DAILY
Qty: 90 TABLET | Refills: 2 | Status: SHIPPED | OUTPATIENT
Start: 2022-05-31

## 2022-05-31 NOTE — PROGRESS NOTES
"    UROLOGY OFFICE follow-up NOTE    Subjective   HPI  Quique Valdez is a 71 y.o. male. Presents for follow-up of BPH with LUTS, urinary urgency; trial combination medical management via alfuzosin and oxybutynin.  History of elevated PSA, being monitored.    The patient reports 3 episodes of nocturia per night. He has not seen any improvement with oxybutynin in addition to alfuzosin. He has residual urine in his bladder, but it is stable. He reports that he is drinking liquids all the way up until bedtime, which he attributes to working 3rd shift. He takes the oxybutynin in the morning before he goes to sleep.     The patient only sleeps 2 or 3 hours per night, then gets up, and may or may not go back to bed. He is compliant with his CPAP. He has had a recall on his CPAP machine.     Sexually active on sildenafil.    New complaint today regarding inability to retract foreskin.  Patient uncircumcised at birth.  He reports that he has to sit down to urinate otherwise urine will spray all over because he is not circumcised. He adds that the problem is worsening, and he cannot pull back his foreskin at all. He states that 2 to 3 months ago he noticed that he cannot pull back his skin to wash it because \"part way down is like there is a rubber band in there now. I pulled it down about 1 month ago, and thought I was going to die.\" He adds that he had to stick his finger down in the skin, and pull it up. To wash it, he has to get soap on his finger because he cannot pull the skin down.     The patient is employed at [employer]. He has a desk job, and also walks around checking on things, but nothing physical.  Thanks aspirin daily.   ____________  PSA trend  11/24/21: 8.67  10/2020: 11.28  6/2019: 16.45  11/2017: 8.3  1/2017: 7.5  6/2016: 7.2  11/2015: 7.2  6/15: 7.2  3/2015: 7.8     Prostate biopsy: 6/18/2015-right and left negative for malignancy     MRI prostate 9/10/2019: 80 g prostate; PIRAD II x2 " lesions; No suspicious lesions (PIRAD IV or V) for malignancy         Results for orders placed or performed in visit on 05/31/22   Bladder Scan   Result Value Ref Range    Urine Volume 115          Medical History:  Past Medical History:   Diagnosis Date   • Elevated WBC count    • High cholesterol    • Hypertension    • Leukocytosis         Social History:  Social History     Socioeconomic History   • Marital status:    • Number of children: 0   Tobacco Use   • Smoking status: Former Smoker   • Smokeless tobacco: Never Used   • Tobacco comment: 10-25 PACK YEARS   Vaping Use   • Vaping Use: Never used   Substance and Sexual Activity   • Alcohol use: Yes     Comment: 0-2 DRINKS/DAY   • Drug use: Not Currently   • Sexual activity: Defer        Family History:  Family History   Problem Relation Age of Onset   • Lung cancer Other         Surgical History:  Past Surgical History:   Procedure Laterality Date   • PROSTATE BIOPSY     • UMBILICAL HERNIA REPAIR          Allergies:  No Known Allergies     Current Medications:  Current Outpatient Medications   Medication Sig Dispense Refill   • alfuzosin (UROXATRAL) 10 MG 24 hr tablet Take 1 tablet by mouth Daily. 90 tablet 1   • amLODIPine-benazepril (LOTREL 5-20) 5-20 MG per capsule Take 1 capsule by mouth Daily. 90 capsule 1   • aspirin (aspirin) 81 MG EC tablet Take 1 tablet by mouth Daily. 90 tablet 1   • diclofenac (VOLTAREN) 50 MG EC tablet Take 1 tablet by mouth 3 (Three) Times a Day. 30 tablet 0   • hydroCHLOROthiazide (HYDRODIURIL) 12.5 MG tablet Take 1 tablet by mouth Daily. 90 tablet 1   • methocarbamol (ROBAXIN) 750 MG tablet Take 1 tablet by mouth 3 (Three) Times a Day. 90 tablet 3   • omeprazole (priLOSEC) 20 MG capsule Take 1 capsule by mouth Daily. 90 capsule 1   • oxybutynin XL (DITROPAN-XL) 5 MG 24 hr tablet Take 2 tablets by mouth Daily. 90 tablet 1   • pravastatin (PRAVACHOL) 40 MG tablet Take 1 tablet by mouth Every Night. 90 tablet 1   •  "sildenafil (VIAGRA) 100 MG tablet Take 1 tablet by mouth Daily As Needed for Erectile Dysfunction. 8 tablet 1   • finasteride (PROSCAR) 5 MG tablet Take 1 tablet by mouth Daily. 90 tablet 2   • nystatin-triamcinolone (MYCOLOG) 946013-9.1 UNIT/GM-% ointment Apply to affected area 2 times daily 30 g 2     No current facility-administered medications for this visit.       Review of systems  A review of systems was performed, and positive findings are noted in the HPI.    Objective     Vital Signs:   Resp 16   Ht 177.8 cm (70\")   Wt 127 kg (280 lb 6.4 oz)   BMI 40.23 kg/m²       Physical exam  No acute distress, well-nourished  Awake alert and oriented  Mood normal; affect normal  : Uncircumcised male with severe phimosis; able to visualize meatus but cannot retract foreskin; no evidence of balanitis    Bladder Scan interpretation 05/31/2022    Estimation of residual urine via BVI 3000 [address] Bladder Scan  Performed by: Hannah Maldonado RN  Residual Urine: 115 ml  Indication: Benign prostatic hyperplasia (BPH) with urinary urgency    Elevated prostate specific antigen (PSA)    Phimosis   Position: Supine  Examination: Incremental scanning of the suprapubic area using 2.0 MHz transducer using copious amounts of acoustic gel.   Findings: An anechoic area was demonstrated which represented the bladder, with measurement of residual urine as noted. I inspected this myself. In that the residual urine was stable, refer to plan for treatment and plan necessary at this time.     Problem List:  Patient Active Problem List   Diagnosis   • BPH (benign prostatic hyperplasia)   • Elevated prostate specific antigen (PSA)   • Esophageal reflux   • Hypertensive disease   • Hyperlipidemia   • Low grade B cell lymphoproliferative disorder (HCC)   • Leukocytosis   • Sciatic leg pain   • Type 2 diabetes mellitus without complication, without long-term current use of insulin (HCC)       Assessment & Plan   Diagnoses and all orders for this " visit:    1. Benign prostatic hyperplasia (BPH) with urinary urgency (Primary)  -     Bladder Scan  -     finasteride (PROSCAR) 5 MG tablet; Take 1 tablet by mouth Daily.  Dispense: 90 tablet; Refill: 2    2. Elevated prostate specific antigen (PSA)    3. Phimosis  -     nystatin-triamcinolone (MYCOLOG) 670434-5.1 UNIT/GM-% ointment; Apply to affected area 2 times daily  Dispense: 30 g; Refill: 2      Due for PSA 11/2022 (1 year from previous)    1. Benign prostatic hyperplasia  2. Erectile dysfunction  3. phimosis    BPH with LUTS, urinary urgency, nocturia- no improvement on combination medical management of alfuzosin and oxybutynin.    Instructed to stop oxybutynin at this time   Given known prostatomegaly (80 g) on prior MRI prostate, recommend combination trial of alfuzosin with finasteride. We discussed finasteride at his last visit, which can shrink prostate tissue up to 25 percent but can take up to 2 to 3 months to reach maximum benefit.  Also discussed that finasteride will lower PSA but has no affect regarding risk of prostate cancer.  Side effects discussed including low libido, fatigue, and gynecomastia.  Encouraged to stop medication if he experiences adverse side effects.    If after 4 months does remain refractory and persistently bothersome, recommend proceeding with work-up via cystoscopy and UDS.    Continue Viagra per routine.    Severe phimosis-for his phimosis, he was advised that we could try a steroid cream to thin out the tissue so he can manipulate the skin but ultimately will likely require circumcision.   The circumcision procedure, expected outcome, time to heal, risks including bleeding, infection, potentially taking too much or too little foreskin with recurrence of scarring, and benefits were discussed with the patient as well as amount of time needed off work being 2 weeks to 1 month depending on sensitivity, pain, and swelling.     If he chooses to have a circumcision performed, I  would also perform a cystoscopy at that time to assess etiology for reported spraying of stream as well as lower urinary tract symptoms.  Aware that he would need to hold aspirin 3 days before his procedure, and then for some time after his procedure as well.  Would not be able to have sexual activity for about 6 weeks until completely healed.    - The patient wishes to begin using the steroid cream now because he is going away on vacation for 5 weeks. He will apply the cream twice per day and manipulate the skin over his penis. If he notices the cream is not working for him, and he has returned from vacation, he can call us, and we will schedule a circumcision and cystoscopy.    Patient encouraged to follow-up in 11/2022, with PSA prior or earlier as needed  All questions addressed      MDM moderate: 2 stable chronic illnesses; 1 new diagnosis with uncertain prognosis; prescription drug management    Transcribed from ambient dictation for Lashonda Vargas MD by Deborah Farah.  05/31/22   10:08 EDT    Patient verbalized consent to the visit recording.

## 2022-09-23 ENCOUNTER — LAB (OUTPATIENT)
Dept: LAB | Facility: HOSPITAL | Age: 72
End: 2022-09-23

## 2022-09-23 DIAGNOSIS — E11.9 TYPE 2 DIABETES MELLITUS WITHOUT COMPLICATION, WITHOUT LONG-TERM CURRENT USE OF INSULIN: ICD-10-CM

## 2022-09-23 DIAGNOSIS — D72.829 LEUKOCYTOSIS, UNSPECIFIED TYPE: ICD-10-CM

## 2022-09-23 LAB
ALBUMIN SERPL-MCNC: 4.4 G/DL (ref 3.5–5.2)
ALBUMIN/GLOB SERPL: 2.1 G/DL
ALP SERPL-CCNC: 124 U/L (ref 39–117)
ALT SERPL W P-5'-P-CCNC: 21 U/L (ref 1–41)
ANION GAP SERPL CALCULATED.3IONS-SCNC: 9 MMOL/L (ref 5–15)
AST SERPL-CCNC: 22 U/L (ref 1–40)
BASOPHILS # BLD AUTO: 0.07 10*3/MM3 (ref 0–0.2)
BASOPHILS NFR BLD AUTO: 0.4 % (ref 0–1.5)
BILIRUB SERPL-MCNC: 0.7 MG/DL (ref 0–1.2)
BUN SERPL-MCNC: 11 MG/DL (ref 8–23)
BUN/CREAT SERPL: 11.1 (ref 7–25)
BURR CELLS BLD QL SMEAR: NORMAL
CALCIUM SPEC-SCNC: 9.4 MG/DL (ref 8.6–10.5)
CHLORIDE SERPL-SCNC: 102 MMOL/L (ref 98–107)
CHOLEST SERPL-MCNC: 185 MG/DL (ref 0–200)
CO2 SERPL-SCNC: 28 MMOL/L (ref 22–29)
CREAT SERPL-MCNC: 0.99 MG/DL (ref 0.76–1.27)
DEPRECATED RDW RBC AUTO: 41.8 FL (ref 37–54)
EGFRCR SERPLBLD CKD-EPI 2021: 82.5 ML/MIN/1.73
EOSINOPHIL # BLD AUTO: 0.21 10*3/MM3 (ref 0–0.4)
EOSINOPHIL NFR BLD AUTO: 1.1 % (ref 0.3–6.2)
ERYTHROCYTE [DISTWIDTH] IN BLOOD BY AUTOMATED COUNT: 13.1 % (ref 12.3–15.4)
GLOBULIN UR ELPH-MCNC: 2.1 GM/DL
GLUCOSE SERPL-MCNC: 96 MG/DL (ref 65–99)
HBA1C MFR BLD: 6.4 % (ref 4.8–5.6)
HCT VFR BLD AUTO: 47 % (ref 37.5–51)
HDLC SERPL-MCNC: 48 MG/DL (ref 40–60)
HGB BLD-MCNC: 15 G/DL (ref 13–17.7)
IMM GRANULOCYTES # BLD AUTO: 0.06 10*3/MM3 (ref 0–0.05)
IMM GRANULOCYTES NFR BLD AUTO: 0.3 % (ref 0–0.5)
LDH SERPL-CCNC: 191 U/L (ref 135–225)
LDLC SERPL CALC-MCNC: 121 MG/DL (ref 0–100)
LDLC/HDLC SERPL: 2.49 {RATIO}
LYMPHOCYTES # BLD AUTO: 12.68 10*3/MM3 (ref 0.7–3.1)
LYMPHOCYTES NFR BLD AUTO: 69.1 % (ref 19.6–45.3)
MCH RBC QN AUTO: 28 PG (ref 26.6–33)
MCHC RBC AUTO-ENTMCNC: 31.9 G/DL (ref 31.5–35.7)
MCV RBC AUTO: 87.7 FL (ref 79–97)
MONOCYTES # BLD AUTO: 0.96 10*3/MM3 (ref 0.1–0.9)
MONOCYTES NFR BLD AUTO: 5.2 % (ref 5–12)
NEUTROPHILS NFR BLD AUTO: 23.9 % (ref 42.7–76)
NEUTROPHILS NFR BLD AUTO: 4.36 10*3/MM3 (ref 1.7–7)
NRBC BLD AUTO-RTO: 0 /100 WBC (ref 0–0.2)
PLAT MORPH BLD: NORMAL
PLATELET # BLD AUTO: 221 10*3/MM3 (ref 140–450)
PMV BLD AUTO: 10.2 FL (ref 6–12)
POTASSIUM SERPL-SCNC: 4.2 MMOL/L (ref 3.5–5.2)
PROT SERPL-MCNC: 6.5 G/DL (ref 6–8.5)
RBC # BLD AUTO: 5.36 10*6/MM3 (ref 4.14–5.8)
SODIUM SERPL-SCNC: 139 MMOL/L (ref 136–145)
TRIGL SERPL-MCNC: 87 MG/DL (ref 0–150)
TSH SERPL DL<=0.05 MIU/L-ACNC: 2 UIU/ML (ref 0.27–4.2)
VLDLC SERPL-MCNC: 16 MG/DL (ref 5–40)
WBC MORPH BLD: NORMAL
WBC NRBC COR # BLD: 18.34 10*3/MM3 (ref 3.4–10.8)

## 2022-09-23 PROCEDURE — 80053 COMPREHEN METABOLIC PANEL: CPT

## 2022-09-23 PROCEDURE — 85025 COMPLETE CBC W/AUTO DIFF WBC: CPT

## 2022-09-23 PROCEDURE — 83036 HEMOGLOBIN GLYCOSYLATED A1C: CPT

## 2022-09-23 PROCEDURE — 36415 COLL VENOUS BLD VENIPUNCTURE: CPT

## 2022-09-23 PROCEDURE — 85007 BL SMEAR W/DIFF WBC COUNT: CPT

## 2022-09-23 PROCEDURE — 84443 ASSAY THYROID STIM HORMONE: CPT

## 2022-09-23 PROCEDURE — 80061 LIPID PANEL: CPT

## 2022-09-23 PROCEDURE — 83615 LACTATE (LD) (LDH) ENZYME: CPT

## 2022-09-29 ENCOUNTER — OFFICE VISIT (OUTPATIENT)
Dept: INTERNAL MEDICINE | Facility: CLINIC | Age: 72
End: 2022-09-29

## 2022-09-29 VITALS
HEIGHT: 70 IN | RESPIRATION RATE: 18 BRPM | TEMPERATURE: 97.2 F | BODY MASS INDEX: 40.63 KG/M2 | HEART RATE: 85 BPM | OXYGEN SATURATION: 98 % | DIASTOLIC BLOOD PRESSURE: 84 MMHG | WEIGHT: 283.8 LBS | SYSTOLIC BLOOD PRESSURE: 158 MMHG

## 2022-09-29 DIAGNOSIS — I10 ESSENTIAL HYPERTENSION: ICD-10-CM

## 2022-09-29 DIAGNOSIS — R20.2 PARESTHESIA OF BOTH FEET: ICD-10-CM

## 2022-09-29 DIAGNOSIS — Z00.00 ENCOUNTER FOR ANNUAL WELLNESS EXAM IN MEDICARE PATIENT: Primary | ICD-10-CM

## 2022-09-29 DIAGNOSIS — B35.1 ONYCHOMYCOSIS: ICD-10-CM

## 2022-09-29 DIAGNOSIS — L84 PRE-ULCERATIVE CALLUSES: ICD-10-CM

## 2022-09-29 DIAGNOSIS — E11.9 TYPE 2 DIABETES MELLITUS WITHOUT COMPLICATION, WITHOUT LONG-TERM CURRENT USE OF INSULIN: ICD-10-CM

## 2022-09-29 PROCEDURE — G0439 PPPS, SUBSEQ VISIT: HCPCS | Performed by: NURSE PRACTITIONER

## 2022-09-29 PROCEDURE — 1159F MED LIST DOCD IN RCRD: CPT | Performed by: NURSE PRACTITIONER

## 2022-09-29 PROCEDURE — 1170F FXNL STATUS ASSESSED: CPT | Performed by: NURSE PRACTITIONER

## 2022-09-29 RX ORDER — BLOOD PRESSURE TEST KIT
1 KIT MISCELLANEOUS DAILY
Qty: 1 EACH | Refills: 0 | OUTPATIENT
Start: 2022-09-29 | End: 2023-01-12

## 2022-09-29 NOTE — PROGRESS NOTES
The ABCs of the Annual Wellness Visit  Subsequent Medicare Wellness Visit    Chief Complaint   Patient presents with   • Medicare Wellness-subsequent   • Diabetes   • Hypertension   • Hyperlipidemia      Subjective    History of Present Illness:  Quique Valdez is a 71 y.o. male who presents for a Subsequent Medicare Wellness Visit.    He reports having some issues with tingling in the bottoms of his feet for years  Calluses of feet  Nail fungus for years    Bumps on arms    Not checking bp routinely  Labs reviewed  Denies chest pain/headache  The following portions of the patient's history were reviewed and   updated as appropriate: allergies, current medications, past family history, past medical history, past social history, past surgical history and problem list.    Compared to one year ago, the patient feels his physical   health is the same.    Compared to one year ago, the patient feels his mental   health is the same.    Recent Hospitalizations:  He was not admitted to the hospital during the last year.       Current Medical Providers:  Patient Care Team:  Charmaine Pate APRN as PCP - General (Nurse Practitioner)  Ijeoma Romero MD PhD as Consulting Physician (Hematology and Oncology)    Outpatient Medications Prior to Visit   Medication Sig Dispense Refill   • alfuzosin (UROXATRAL) 10 MG 24 hr tablet Take 1 tablet by mouth Daily. 90 tablet 1   • amLODIPine-benazepril (LOTREL 5-20) 5-20 MG per capsule Take 1 capsule by mouth Daily. 90 capsule 1   • aspirin (aspirin) 81 MG EC tablet Take 1 tablet by mouth Daily. 90 tablet 1   • finasteride (PROSCAR) 5 MG tablet Take 1 tablet by mouth Daily. 90 tablet 2   • hydroCHLOROthiazide (HYDRODIURIL) 12.5 MG tablet Take 1 tablet by mouth Daily. 90 tablet 1   • methocarbamol (ROBAXIN) 750 MG tablet Take 1 tablet by mouth 3 (Three) Times a Day. 90 tablet 3   • omeprazole (priLOSEC) 20 MG capsule Take 1 capsule by mouth Daily. 90 capsule 1   • oxybutynin XL  "(DITROPAN-XL) 5 MG 24 hr tablet Take 2 tablets by mouth Daily. 90 tablet 1   • pravastatin (PRAVACHOL) 40 MG tablet Take 1 tablet by mouth Every Night. 90 tablet 1   • sildenafil (VIAGRA) 100 MG tablet Take 1 tablet by mouth Daily As Needed for Erectile Dysfunction. 8 tablet 1   • diclofenac (VOLTAREN) 50 MG EC tablet Take 1 tablet by mouth 3 (Three) Times a Day. 30 tablet 0   • nystatin-triamcinolone (MYCOLOG) 492472-1.1 UNIT/GM-% ointment Apply to affected area 2 times daily 30 g 2     No facility-administered medications prior to visit.       No opioid medication identified on active medication list. I have reviewed chart for other potential  high risk medication/s and harmful drug interactions in the elderly.          Aspirin is on active medication list. Aspirin use is indicated based on review of current medical condition/s. Pros and cons of this therapy have been discussed today. Benefits of this medication outweigh potential harm.  Patient has been encouraged to continue taking this medication.  .      Patient Active Problem List   Diagnosis   • BPH (benign prostatic hyperplasia)   • Elevated prostate specific antigen (PSA)   • Esophageal reflux   • Hypertensive disease   • Hyperlipidemia   • Low grade B cell lymphoproliferative disorder (HCC)   • Leukocytosis   • Sciatic leg pain   • Type 2 diabetes mellitus without complication, without long-term current use of insulin (HCC)     Advance Care Planning  Advance Directive is not on file.  ACP discussion was held with the patient during this visit. Patient does not have an advance directive, declines further assistance.          Objective    Vitals:    09/29/22 0806   BP: 158/84   BP Location: Left arm   Patient Position: Sitting   Cuff Size: Adult   Pulse: 85   Resp: 18   Temp: 97.2 °F (36.2 °C)   SpO2: 98%   Weight: 129 kg (283 lb 12.8 oz)   Height: 177.8 cm (70\")     Estimated body mass index is 40.72 kg/m² as calculated from the following:    Height as of " "this encounter: 177.8 cm (70\").    Weight as of this encounter: 129 kg (283 lb 12.8 oz).    Class 3 Severe Obesity (BMI >=40). Obesity-related health conditions include the following: hypertension, diabetes mellitus and dyslipidemias. Obesity is unchanged. BMI is is above average; BMI management plan is completed. We discussed low calorie, low carb based diet program, portion control and increasing exercise.      Does the patient have evidence of cognitive impairment? No    Physical Exam  Vitals and nursing note reviewed.   Constitutional:       General: He is not in acute distress.     Appearance: Normal appearance.   HENT:      Head: Normocephalic and atraumatic.      Right Ear: External ear normal.      Left Ear: External ear normal.      Nose: Nose normal.      Mouth/Throat:      Mouth: Mucous membranes are moist.   Eyes:      Conjunctiva/sclera: Conjunctivae normal.   Cardiovascular:      Rate and Rhythm: Normal rate and regular rhythm.      Pulses: Normal pulses.      Heart sounds: Normal heart sounds. No murmur heard.    No friction rub. No gallop.   Pulmonary:      Effort: Pulmonary effort is normal. No respiratory distress.      Breath sounds: No wheezing, rhonchi or rales.   Musculoskeletal:      Cervical back: Neck supple.      Right lower leg: No edema.      Left lower leg: No edema.   Skin:     General: Skin is warm and dry.   Neurological:      General: No focal deficit present.      Mental Status: He is alert and oriented to person, place, and time.   Psychiatric:         Mood and Affect: Mood normal.         Behavior: Behavior normal.       Lab Results   Component Value Date    TRIG 87 09/23/2022    HDL 48 09/23/2022     (H) 09/23/2022    VLDL 16 09/23/2022    HGBA1C 6.40 (H) 09/23/2022            HEALTH RISK ASSESSMENT    Smoking Status:  Social History     Tobacco Use   Smoking Status Former Smoker   Smokeless Tobacco Never Used   Tobacco Comment    10-25 PACK YEARS     Alcohol " Consumption:  Social History     Substance and Sexual Activity   Alcohol Use Yes    Comment: 0-2 DRINKS/DAY     Fall Risk Screen:    JENNI Fall Risk Assessment was completed, and patient is at LOW risk for falls.Assessment completed on:9/29/2022    Depression Screening:  PHQ-2/PHQ-9 Depression Screening 9/29/2022   Retired PHQ-9 Total Score -   Retired Total Score -   Little Interest or Pleasure in Doing Things 0-->not at all   Feeling Down, Depressed or Hopeless 0-->not at all   PHQ-9: Brief Depression Severity Measure Score 0       Health Habits and Functional and Cognitive Screening:  Functional & Cognitive Status 9/29/2022   Do you have difficulty preparing food and eating? No   Do you have difficulty bathing yourself, getting dressed or grooming yourself? No   Do you have difficulty using the toilet? No   Do you have difficulty moving around from place to place? No   Do you have trouble with steps or getting out of a bed or a chair? No   Current Diet Well Balanced Diet   Dental Exam Up to date   Eye Exam Up to date   Exercise (times per week) 5 times per week   Current Exercises Include Walking   Do you need help using the phone?  No   Are you deaf or do you have serious difficulty hearing?  No   Do you need help with transportation? No   Do you need help shopping? No   Do you need help preparing meals?  No   Do you need help with housework?  No   Do you need help with laundry? No   Do you need help taking your medications? No   Do you need help managing money? No   Do you ever drive or ride in a car without wearing a seat belt? No   Have you felt unusual stress, anger or loneliness in the last month? No   Who do you live with? Spouse   If you need help, do you have trouble finding someone available to you? No   Have you been bothered in the last four weeks by sexual problems? No   Do you have difficulty concentrating, remembering or making decisions? No       Age-appropriate Screening Schedule:  Refer to the  list below for future screening recommendations based on patient's age, sex and/or medical conditions. Orders for these recommended tests are listed in the plan section. The patient has been provided with a written plan.    Health Maintenance   Topic Date Due   • URINE MICROALBUMIN  Never done   • TDAP/TD VACCINES (1 - Tdap) Never done   • ZOSTER VACCINE (1 of 2) Never done   • DIABETIC FOOT EXAM  Never done   • DIABETIC EYE EXAM  Never done   • INFLUENZA VACCINE  08/01/2022   • HEMOGLOBIN A1C  03/23/2023   • LIPID PANEL  09/23/2023              Assessment & Plan   CMS Preventative Services Quick Reference  Risk Factors Identified During Encounter  Obesity/Overweight   Polypharmacy  Urinary Incontinence  The above risks/problems have been discussed with the patient.  Follow up actions/plans if indicated are seen below in the Assessment/Plan Section.  Pertinent information has been shared with the patient in the After Visit Summary.    Diagnoses and all orders for this visit:    1. Encounter for annual wellness exam in Medicare patient (Primary)    2. Pre-ulcerative calluses  Comments:  Podiatry referral  Orders:  -     Ambulatory Referral to Podiatry    3. Paresthesia of both feet  -     Ambulatory Referral to Podiatry    4. Onychomycosis  -     Ambulatory Referral to Podiatry    5. Essential hypertension  Comments:  will get bp monitor and start checking  Orders:  -     Blood Pressure Monitor kit; 1 application Daily.  Dispense: 1 each; Refill: 0    6. Type 2 diabetes mellitus without complication, without long-term current use of insulin (HCC)  Comments:  Labs reviewed.  We will recheck labs in 3 months.  Discussed options for medication versus continuing to work on healthy eating habits.  Orders:  -     CBC & Differential; Future  -     Comprehensive Metabolic Panel; Future  -     Hemoglobin A1c; Future  -     Lipid Panel; Future  -     TSH; Future        Follow Up:   Return in about 6 months (around 3/29/2023).      An After Visit Summary and PPPS were made available to the patient.

## 2022-10-10 ENCOUNTER — TELEPHONE (OUTPATIENT)
Dept: ONCOLOGY | Facility: HOSPITAL | Age: 72
End: 2022-10-10

## 2022-10-11 ENCOUNTER — APPOINTMENT (OUTPATIENT)
Dept: ONCOLOGY | Facility: HOSPITAL | Age: 72
End: 2022-10-11

## 2022-10-12 ENCOUNTER — OFFICE VISIT (OUTPATIENT)
Dept: ONCOLOGY | Facility: HOSPITAL | Age: 72
End: 2022-10-12

## 2022-10-12 VITALS
SYSTOLIC BLOOD PRESSURE: 176 MMHG | DIASTOLIC BLOOD PRESSURE: 72 MMHG | HEART RATE: 72 BPM | RESPIRATION RATE: 18 BRPM | OXYGEN SATURATION: 97 % | WEIGHT: 285.94 LBS | TEMPERATURE: 97.5 F | BODY MASS INDEX: 41.03 KG/M2

## 2022-10-12 DIAGNOSIS — D72.829 LEUKOCYTOSIS, UNSPECIFIED TYPE: ICD-10-CM

## 2022-10-12 PROCEDURE — 99213 OFFICE O/P EST LOW 20 MIN: CPT | Performed by: INTERNAL MEDICINE

## 2022-10-12 PROCEDURE — G0463 HOSPITAL OUTPT CLINIC VISIT: HCPCS | Performed by: INTERNAL MEDICINE

## 2022-11-22 ENCOUNTER — LAB (OUTPATIENT)
Dept: LAB | Facility: HOSPITAL | Age: 72
End: 2022-11-22

## 2022-11-22 DIAGNOSIS — R97.20 ELEVATED PROSTATE SPECIFIC ANTIGEN (PSA): ICD-10-CM

## 2022-11-22 LAB — PSA SERPL-MCNC: 7.48 NG/ML (ref 0–4)

## 2022-11-22 PROCEDURE — 36415 COLL VENOUS BLD VENIPUNCTURE: CPT

## 2022-11-22 PROCEDURE — 84153 ASSAY OF PSA TOTAL: CPT

## 2022-11-30 ENCOUNTER — OFFICE VISIT (OUTPATIENT)
Dept: UROLOGY | Facility: CLINIC | Age: 72
End: 2022-11-30

## 2022-11-30 ENCOUNTER — PREP FOR SURGERY (OUTPATIENT)
Dept: OTHER | Facility: HOSPITAL | Age: 72
End: 2022-11-30

## 2022-11-30 VITALS — WEIGHT: 281.4 LBS | RESPIRATION RATE: 16 BRPM | BODY MASS INDEX: 40.29 KG/M2 | HEIGHT: 70 IN

## 2022-11-30 DIAGNOSIS — N40.1 BENIGN PROSTATIC HYPERPLASIA WITH URINARY FREQUENCY: ICD-10-CM

## 2022-11-30 DIAGNOSIS — R35.0 BENIGN PROSTATIC HYPERPLASIA WITH URINARY FREQUENCY: ICD-10-CM

## 2022-11-30 DIAGNOSIS — R97.20 ELEVATED PROSTATE SPECIFIC ANTIGEN (PSA): ICD-10-CM

## 2022-11-30 DIAGNOSIS — N52.9 ERECTILE DYSFUNCTION, UNSPECIFIED ERECTILE DYSFUNCTION TYPE: ICD-10-CM

## 2022-11-30 DIAGNOSIS — R39.15 BENIGN PROSTATIC HYPERPLASIA (BPH) WITH URINARY URGENCY: Primary | ICD-10-CM

## 2022-11-30 DIAGNOSIS — N47.1 PHIMOSIS: ICD-10-CM

## 2022-11-30 DIAGNOSIS — N40.1 BENIGN PROSTATIC HYPERPLASIA (BPH) WITH URINARY URGENCY: Primary | ICD-10-CM

## 2022-11-30 DIAGNOSIS — N47.1 PHIMOSIS OF PENIS: Primary | ICD-10-CM

## 2022-11-30 PROCEDURE — 99214 OFFICE O/P EST MOD 30 MIN: CPT | Performed by: UROLOGY

## 2022-11-30 RX ORDER — DOXAZOSIN MESYLATE 4 MG/1
4 TABLET ORAL DAILY
Qty: 90 TABLET | Refills: 1 | Status: SHIPPED | OUTPATIENT
Start: 2022-11-30 | End: 2023-03-30

## 2022-11-30 RX ORDER — CEFAZOLIN SODIUM 2 G/100ML
2 INJECTION, SOLUTION INTRAVENOUS ONCE
Status: CANCELLED | OUTPATIENT
Start: 2022-11-30 | End: 2022-11-30

## 2022-11-30 NOTE — PROGRESS NOTES
"    UROLOGY OFFICE follow-up NOTE    Subjective   HPI  Quique Valdez is a 72 y.o. male. Presents for follow-up of BPH with LUTS, urinary urgency; trial combination medical management via alfuzosin and oxybutynin.  History of elevated PSA, being monitored.     The patient reports 3 episodes of nocturia per night. He has not seen any improvement with oxybutynin in addition to alfuzosin. He has residual urine in his bladder, but it is stable. He reports that he is drinking liquids all the way up until bedtime, which he attributes to working 3rd shift. He takes the oxybutynin in the morning before he goes to sleep.      The patient only sleeps 2 or 3 hours per night, then gets up, and may or may not go back to bed. He is compliant with his CPAP. He has had a recall on his CPAP machine.      Sexually active on sildenafil.     New complaint today regarding inability to retract foreskin.  Patient uncircumcised at birth.  He reports that he has to sit down to urinate otherwise urine will spray all over because he is not circumcised. He adds that the problem is worsening, and he cannot pull back his foreskin at all. He states that 2 to 3 months ago he noticed that he cannot pull back his skin to wash it because \"part way down is like there is a rubber band in there now. I pulled it down about 1 month ago, and thought I was going to die.\" He adds that he had to stick his finger down in the skin, and pull it up. To wash it, he has to get soap on his finger because he cannot pull the skin down.      The patient is employed at [employer]. He has a desk job, and also walks around checking on things, but nothing physical.  Thanks aspirin daily.     Update 11/30/2022: Patient presents for routine follow-up of phimosis, elevated PSA, BPH with LUTS. The patient reports that he is doing well. He states that he is taking finasteride 5 mg daily. He notes that he is taking alfuzosin in the morning.  States that [facility] will no longer be caring alfuzosin; request new BPH medication.  Tried steroid cream; no improvement of his phimosis; significantly bothered by this.  The patient reports that he has to sit down to use the bathroom 99 percent of the time. He states that if he stands at a urinal, he has to lean in because he never knows which way his stream is going to go. He notes that he has to use a syringe when he takes a shower to clean the inside. He states that he urinates all the time.    The patient reports that he is not a diabetic. He states that he takes aspirin.    ____________  PSA trend  11/22/2022: 7.48 (on finasteride)  11/24/21: 8.67  10/2020: 11.28  6/2019: 16.45  11/2017: 8.3  1/2017: 7.5  6/2016: 7.2  11/2015: 7.2  6/15: 7.2  3/2015: 7.8     Prostate biopsy: 6/18/2015-right and left negative for malignancy     MRI prostate 9/10/2019: 80 g prostate; PIRAD II x2 lesions; No suspicious lesions (PIRAD IV or V) for malignancy      Results for orders placed or performed in visit on 11/22/22   PSA DIAGNOSTIC    Specimen: Blood   Result Value Ref Range    PSA 7.480 (H) 0.000 - 4.000 ng/mL         Allergies:  No Known Allergies     Current Medications:  Current Outpatient Medications   Medication Sig Dispense Refill   • amLODIPine-benazepril (LOTREL 5-20) 5-20 MG per capsule Take 1 capsule by mouth Daily. 90 capsule 1   • aspirin (aspirin) 81 MG EC tablet Take 1 tablet by mouth Daily. 90 tablet 1   • Blood Pressure Monitor kit 1 application Daily. 1 each 0   • diclofenac (VOLTAREN) 50 MG EC tablet Take 1 tablet by mouth 3 (Three) Times a Day. 30 tablet 0   • finasteride (PROSCAR) 5 MG tablet Take 1 tablet by mouth Daily. 90 tablet 2   • hydroCHLOROthiazide (HYDRODIURIL) 12.5 MG tablet Take 1 tablet by mouth Daily. 90 tablet 1   • methocarbamol (ROBAXIN) 750 MG tablet Take 1 tablet by mouth 3 (Three) Times a Day. 90 tablet 3   • nystatin-triamcinolone (MYCOLOG) 607134-2.1 UNIT/GM-% ointment Apply to affected area 2  "times daily 30 g 2   • omeprazole (priLOSEC) 20 MG capsule Take 1 capsule by mouth Daily. 90 capsule 1   • oxybutynin XL (DITROPAN-XL) 5 MG 24 hr tablet Take 2 tablets by mouth Daily. 90 tablet 1   • pravastatin (PRAVACHOL) 40 MG tablet Take 1 tablet by mouth Every Night. 90 tablet 1   • sildenafil (VIAGRA) 100 MG tablet Take 1 tablet by mouth Daily As Needed for Erectile Dysfunction. 8 tablet 1   • doxazosin (Cardura) 4 MG tablet Take 1 tablet by mouth Daily for 90 days. 90 tablet 1     No current facility-administered medications for this visit.       Review of systems  A review of systems was performed, and positive findings are noted in the HPI.    Objective     Vital Signs:   Resp 16   Ht 177.8 cm (70\")   Wt 128 kg (281 lb 6.4 oz)   BMI 40.38 kg/m²       Physical exam  No acute distress, well-nourished  Awake alert and oriented  Mood normal; affect normal  : Severe phimosis; unable to visualize glans or retract foreskin    Problem List:  Patient Active Problem List   Diagnosis   • BPH (benign prostatic hyperplasia)   • Elevated prostate specific antigen (PSA)   • Esophageal reflux   • Hypertensive disease   • Hyperlipidemia   • Low grade B cell lymphoproliferative disorder (HCC)   • Leukocytosis   • Sciatic leg pain   • Type 2 diabetes mellitus without complication, without long-term current use of insulin (HCC)   • Phimosis of penis       Assessment & Plan      Diagnoses and all orders for this visit:    1. Benign prostatic hyperplasia (BPH) with urinary urgency (Primary)  -     doxazosin (Cardura) 4 MG tablet; Take 1 tablet by mouth Daily for 90 days.  Dispense: 90 tablet; Refill: 1    2. Elevated prostate specific antigen (PSA)  -     PSA DIAGNOSTIC; Future    3. Phimosis    4. Erectile dysfunction, unspecified erectile dysfunction type      Benign prostatic hyperplasia with lower urinary tract symptoms (LUTS)  The patient will continue taking finasteride 5 mg daily.  Trial of Cardura 4 mg as patient " was seen will no longer carry alfuzosin.  Discussed that potential side effects are similar as both medications are within the same class.  Tolerated alfuzosin without adverse side effect.  Prescription sent to pharmacy    Urinary urgency frequency; recommend further evaluation of patient's persistent urinary complaints via cystoscopy time of circumcision.  Patient agreeable.    Elevated PSA-most recent 7.47 from 8.67; discussed that finasteride will decrease his PSA but general trend appears stable despite this.  We will recheck his PSA in 6 months.    Phimosis-refractory to conservative, medical management; remains significantly bothered with associated urinary symptoms as well as hygiene concerns.  Recommend proceeding to the OR for management via circumcision as well as cystoscopy.  Risk, benefits, and alternatives of the procedure discussed with patient including but not limited to bleeding, infection, damage to surrounding structure, pain, need for further procedures, and risks of anesthesia including and up to death.  Patient notes understanding is agreeable to proceed  Obtain clearance to hold aspirin prior to surgery    ED, continue Viagra    Schedule OR  All questions addressed        MDM moderate: 2 stable chronic illnesses; prescription drug management; decision regarding surgery including patient or procedure identified risk factors    Transcribed from ambient dictation for Lashonda Vargas MD by Marcela Telles.  11/30/22   09:06 EST    Patient or patient representative verbalized consent to the visit recording.  I have personally performed the services described in this document as transcribed by the above individual, and it is both accurate and complete.

## 2023-01-03 ENCOUNTER — OFFICE VISIT (OUTPATIENT)
Dept: PODIATRY | Facility: CLINIC | Age: 73
End: 2023-01-03
Payer: MEDICARE

## 2023-01-03 VITALS
WEIGHT: 294 LBS | BODY MASS INDEX: 42.09 KG/M2 | TEMPERATURE: 97.6 F | HEART RATE: 89 BPM | OXYGEN SATURATION: 97 % | DIASTOLIC BLOOD PRESSURE: 99 MMHG | SYSTOLIC BLOOD PRESSURE: 160 MMHG | HEIGHT: 70 IN

## 2023-01-03 DIAGNOSIS — B35.1 ONYCHOMYCOSIS: ICD-10-CM

## 2023-01-03 DIAGNOSIS — G62.9 NEUROPATHY: ICD-10-CM

## 2023-01-03 DIAGNOSIS — M79.672 FOOT PAIN, BILATERAL: Primary | ICD-10-CM

## 2023-01-03 DIAGNOSIS — M79.671 FOOT PAIN, BILATERAL: Primary | ICD-10-CM

## 2023-01-03 DIAGNOSIS — L60.0 ONYCHOCRYPTOSIS: ICD-10-CM

## 2023-01-03 PROCEDURE — 1159F MED LIST DOCD IN RCRD: CPT | Performed by: PODIATRIST

## 2023-01-03 PROCEDURE — 1160F RVW MEDS BY RX/DR IN RCRD: CPT | Performed by: PODIATRIST

## 2023-01-03 PROCEDURE — 11721 DEBRIDE NAIL 6 OR MORE: CPT | Performed by: PODIATRIST

## 2023-01-03 PROCEDURE — 99204 OFFICE O/P NEW MOD 45 MIN: CPT | Performed by: PODIATRIST

## 2023-01-03 RX ORDER — TERBINAFINE HYDROCHLORIDE 250 MG/1
250 TABLET ORAL DAILY
Qty: 90 TABLET | Refills: 0 | Status: SHIPPED | OUTPATIENT
Start: 2023-01-03 | End: 2023-04-03

## 2023-01-03 NOTE — PROGRESS NOTES
[facility] - PODIATRY    Today's Date: 01/03/23    Patient Name: Quique Valdez  MRN: [id number]  CSN: [id number]  PCP: Charmaine Pate APRN, Last PCP Visit:  9/29/2022  Referring Provider: Charmaine Pate APRN    SUBJECTIVE     Chief Complaint   Patient presents with   • Left Foot - Callouses, Nail Problem     numbness   • Right Foot - Callouses, Nail Problem     numbness     HPI: Quique Valdez, a 72 y.o.male, comes to clinic.    New, Established, New Problem:  New  Location:  Toenails  Duration:   Greater than five years  Onset:  Gradual  Nature:  sore with palpation.  Stable, worsening, improving:   Worsening  Aggravating factors:  Pain with shoe gear and ambulation.  Previous Treatment:  debridement    Patient relates numbness in his feet.    Patient denies any fevers, chills, nausea, vomiting, shortness of breath, nor any other constitutional signs nor symptoms.       Past Medical History:   Diagnosis Date   • Elevated WBC count    • High cholesterol    • Hypertension    • Leukocytosis      Past Surgical History:   Procedure Laterality Date   • PROSTATE BIOPSY     • UMBILICAL HERNIA REPAIR       Family History   Problem Relation Age of Onset   • Lung cancer Other      Social History     Socioeconomic History   • Marital status:    • Number of children: 0   Tobacco Use   • Smoking status: Former   • Smokeless tobacco: Never   • Tobacco comments:     10-25 PACK YEARS   Vaping Use   • Vaping Use: Never used   Substance and Sexual Activity   • Alcohol use: Yes     Comment: 0-2 DRINKS/DAY   • Drug use: Not Currently   • Sexual activity: Defer     No Known Allergies  Current Outpatient Medications   Medication Sig Dispense Refill   • amLODIPine-benazepril (LOTREL 5-20) 5-20 MG per capsule Take 1 capsule by mouth Daily. 90 capsule 1   • aspirin (aspirin) 81 MG EC tablet Take 1 tablet by mouth Daily. 90 tablet 1   • Blood Pressure Monitor kit 1 application Daily. 1 each 0   • diclofenac  (VOLTAREN) 50 MG EC tablet Take 1 tablet by mouth 3 (Three) Times a Day. 30 tablet 0   • doxazosin (Cardura) 4 MG tablet Take 1 tablet by mouth Daily for 90 days. 90 tablet 1   • finasteride (PROSCAR) 5 MG tablet Take 1 tablet by mouth Daily. 90 tablet 2   • hydroCHLOROthiazide (HYDRODIURIL) 12.5 MG tablet Take 1 tablet by mouth Daily. 90 tablet 1   • methocarbamol (ROBAXIN) 750 MG tablet Take 1 tablet by mouth 3 (Three) Times a Day. 90 tablet 3   • nystatin-triamcinolone (MYCOLOG) 151938-4.1 UNIT/GM-% ointment Apply to affected area 2 times daily 30 g 2   • omeprazole (priLOSEC) 20 MG capsule Take 1 capsule by mouth Daily. 90 capsule 1   • oxybutynin XL (DITROPAN-XL) 5 MG 24 hr tablet Take 2 tablets by mouth Daily. 90 tablet 1   • pravastatin (PRAVACHOL) 40 MG tablet Take 1 tablet by mouth Every Night. 90 tablet 1   • sildenafil (VIAGRA) 100 MG tablet Take 1 tablet by mouth Daily As Needed for Erectile Dysfunction. 8 tablet 1   • terbinafine (LamISIL) 250 MG tablet Take 1 tablet by mouth Daily for 90 days. 90 tablet 0     No current facility-administered medications for this visit.     Review of Systems   Constitutional: Negative.    Skin:        Painful toenails   Neurological: Positive for numbness.   All other systems reviewed and are negative.      OBJECTIVE     Vitals:    01/03/23 0855   BP: 160/99   Pulse:    Temp:    SpO2:        Patient seen in no apparent distress.      PHYSICAL EXAM:     Foot/Ankle Exam:       General:   Appearance: obesity and elderly    Appearance comment:  Chronically ill  Orientation: AAOx3    Affect: appropriate    Gait: unimpaired    Shoe Gear:  Casual shoes    VASCULAR      Right Foot Vascularity   Normal vascular exam    Dorsalis pedis:  2+  Posterior tibial:  2+  Skin Temperature: warm    Edema Grading:  None  CFT:  < 3 seconds  Pedal Hair Growth:  Absent  Varicosities: moderate varicosities       Left Foot Vascularity   Normal vascular exam    Dorsalis pedis:  2+  Posterior  tibial:  2+  Skin Temperature: warm    Edema Grading:  None  CFT:  < 3 seconds  Pedal Hair Growth:  Present  Varicosities: none        NEUROLOGIC     Right Foot Neurologic   Light touch sensation:  Diminished  Vibratory sensation:  Diminished  Hot/Cold sensation: diminished    Protective Sensation using [place]-Jaylan Monofilament:  4     Left Foot Neurologic   Light touch sensation:  Diminished  Vibratory sensation:  Diminished  Hot/cold sensation: diminished    Protective Sensation using [place]-Jaylan Monofilament:  4     MUSCLE STRENGTH     Right Foot Muscle Strength   Foot dorsiflexion:  4  Foot plantar flexion:  4  Foot inversion:  4  Foot eversion:  4     Left Foot Muscle Strength   Foot dorsiflexion:  4  Foot plantar flexion:  4  Foot inversion:  4  Foot eversion:  4     RANGE OF MOTION      Right Foot Range of Motion   Foot and ankle ROM within normal limits       Left Foot Range of Motion   Foot and ankle ROM within normal limits       DERMATOLOGIC     Right Foot Dermatologic   Skin: skin intact    Nails: onychomycosis, abnormally thick, subungual debris and dystrophic nails    Nails comment:  Toenails 2, 3, 4, and 5     Left Foot Dermatologic   Skin: skin intact    Nails: onychomycosis, abnormally thick, subungual debris, dystrophic nails and ingrown toenail    Nails comment:  Toenails 2, 3, 4, and 5    Lab Results   Component Value Date    GLUCOSE 96 09/23/2022    BUN 11 09/23/2022    CREATININE 0.99 09/23/2022    EGFRIFNONA 70 09/20/2021    BCR 11.1 09/23/2022    K 4.2 09/23/2022    CO2 28.0 09/23/2022    CALCIUM 9.4 09/23/2022    ALBUMIN 4.40 09/23/2022    LABIL2 1.9 12/14/2020    AST 22 09/23/2022    ALT 21 09/23/2022         ASSESSMENT/PLAN     Diagnoses and all orders for this visit:    1. Foot pain, bilateral (Primary)    2. Onychomycosis  -     terbinafine (LamISIL) 250 MG tablet; Take 1 tablet by mouth Daily for 90 days.  Dispense: 90 tablet; Refill: 0    3. Onychocryptosis    4.  Neuropathy        Comprehensive lower extremity examination and evaluation was performed.    Discussed findings and treatment plan including risks, benefits, and treatment options with patient in detail. Patient agreed with treatment plan.    Toenails 2, 3, 4, 5 on Right and 2, 3, 4, 5 on Left were debrided with nail nippers then filed with a Dremel nail brinda.  Patient tolerated procedure well without complications.    An After Visit Summary was printed and given to the patient at discharge, including (if requested) any available informative/educational handouts regarding diagnosis, treatment, or medications. All questions were answered to patient/family satisfaction. Should symptoms fail to improve or worsen they agree to call or return to clinic or to go to the Emergency Department. Discussed the importance of following up with any needed screening tests/labs/specialist appointments and any requested follow-up recommended by me today. Importance of maintaining follow-up discussed and patient accepts that missed appointments can delay diagnosis and potentially lead to worsening of conditions.    Return in about 9 weeks (around 3/7/2023) for Toenail Care., or sooner if acute issues arise.    This document has been electronically signed by Jonathan Javed DPM on January 3, 2023 09:29 EST

## 2023-01-04 ENCOUNTER — PATIENT ROUNDING (BHMG ONLY) (OUTPATIENT)
Dept: PODIATRY | Facility: CLINIC | Age: 73
End: 2023-01-04
Payer: MEDICARE

## 2023-01-04 NOTE — PROGRESS NOTES
A [email or web address] message has been sent to the patient for PATIENT ROUNDING with [facility]

## 2023-01-13 ENCOUNTER — HOSPITAL ENCOUNTER (OUTPATIENT)
Facility: HOSPITAL | Age: 73
Setting detail: HOSPITAL OUTPATIENT SURGERY
Discharge: HOME | End: 2023-01-13
Attending: UROLOGY | Admitting: UROLOGY
Payer: MEDICARE

## 2023-01-13 ENCOUNTER — ANESTHESIA (OUTPATIENT)
Dept: PERIOP | Facility: HOSPITAL | Age: 73
End: 2023-01-13
Payer: MEDICARE

## 2023-01-13 ENCOUNTER — ANESTHESIA EVENT (OUTPATIENT)
Dept: PERIOP | Facility: HOSPITAL | Age: 73
End: 2023-01-13
Payer: MEDICARE

## 2023-01-13 VITALS
RESPIRATION RATE: 18 BRPM | HEIGHT: 70 IN | SYSTOLIC BLOOD PRESSURE: 165 MMHG | OXYGEN SATURATION: 100 % | DIASTOLIC BLOOD PRESSURE: 86 MMHG | WEIGHT: 283.95 LBS | TEMPERATURE: 96.6 F | HEART RATE: 75 BPM | BODY MASS INDEX: 40.65 KG/M2

## 2023-01-13 DIAGNOSIS — R73.01 IMPAIRED FASTING GLUCOSE: ICD-10-CM

## 2023-01-13 DIAGNOSIS — R35.0 BENIGN PROSTATIC HYPERPLASIA WITH URINARY FREQUENCY: ICD-10-CM

## 2023-01-13 DIAGNOSIS — I10 ESSENTIAL HYPERTENSION: Chronic | ICD-10-CM

## 2023-01-13 DIAGNOSIS — N47.1 PHIMOSIS OF PENIS: ICD-10-CM

## 2023-01-13 DIAGNOSIS — K21.9 GASTROESOPHAGEAL REFLUX DISEASE, UNSPECIFIED WHETHER ESOPHAGITIS PRESENT: ICD-10-CM

## 2023-01-13 DIAGNOSIS — E78.2 MIXED HYPERLIPIDEMIA: Chronic | ICD-10-CM

## 2023-01-13 DIAGNOSIS — N40.1 BENIGN PROSTATIC HYPERPLASIA WITH URINARY FREQUENCY: ICD-10-CM

## 2023-01-13 LAB — GLUCOSE BLDC GLUCOMTR-MCNC: 147 MG/DL (ref 70–99)

## 2023-01-13 PROCEDURE — 54161 CIRCUM 28 DAYS OR OLDER: CPT | Performed by: UROLOGY

## 2023-01-13 PROCEDURE — S0260 H&P FOR SURGERY: HCPCS | Performed by: UROLOGY

## 2023-01-13 PROCEDURE — 93005 ELECTROCARDIOGRAM TRACING: CPT | Performed by: UROLOGY

## 2023-01-13 PROCEDURE — 82962 GLUCOSE BLOOD TEST: CPT

## 2023-01-13 PROCEDURE — 25010000002 MIDAZOLAM PER 1 MG: Performed by: ANESTHESIOLOGY

## 2023-01-13 PROCEDURE — 25010000002 ONDANSETRON PER 1 MG: Performed by: NURSE ANESTHETIST, CERTIFIED REGISTERED

## 2023-01-13 PROCEDURE — 52000 CYSTOURETHROSCOPY: CPT | Performed by: UROLOGY

## 2023-01-13 PROCEDURE — 93010 ELECTROCARDIOGRAM REPORT: CPT | Performed by: INTERNAL MEDICINE

## 2023-01-13 PROCEDURE — 25010000002 PROPOFOL 10 MG/ML EMULSION: Performed by: NURSE ANESTHETIST, CERTIFIED REGISTERED

## 2023-01-13 PROCEDURE — 25010000002 DEXAMETHASONE PER 1 MG: Performed by: NURSE ANESTHETIST, CERTIFIED REGISTERED

## 2023-01-13 PROCEDURE — 25010000002 CEFAZOLIN IN DEXTROSE 2-4 GM/100ML-% SOLUTION: Performed by: UROLOGY

## 2023-01-13 PROCEDURE — 25010000002 FENTANYL CITRATE (PF) 50 MCG/ML SOLUTION: Performed by: NURSE ANESTHETIST, CERTIFIED REGISTERED

## 2023-01-13 RX ORDER — DEXMEDETOMIDINE HYDROCHLORIDE 100 UG/ML
INJECTION, SOLUTION INTRAVENOUS AS NEEDED
Status: DISCONTINUED | OUTPATIENT
Start: 2023-01-13 | End: 2023-01-13 | Stop reason: SURG

## 2023-01-13 RX ORDER — ACETAMINOPHEN 500 MG
1000 TABLET ORAL ONCE
Status: DISCONTINUED | OUTPATIENT
Start: 2023-01-13 | End: 2023-01-13 | Stop reason: HOSPADM

## 2023-01-13 RX ORDER — ONDANSETRON 2 MG/ML
4 INJECTION INTRAMUSCULAR; INTRAVENOUS ONCE AS NEEDED
Status: DISCONTINUED | OUTPATIENT
Start: 2023-01-13 | End: 2023-01-13 | Stop reason: HOSPADM

## 2023-01-13 RX ORDER — GLYCOPYRROLATE 0.2 MG/ML
0.2 INJECTION INTRAMUSCULAR; INTRAVENOUS
Status: COMPLETED | OUTPATIENT
Start: 2023-01-13 | End: 2023-01-13

## 2023-01-13 RX ORDER — OXYCODONE HYDROCHLORIDE 5 MG/1
5 TABLET ORAL
Status: DISCONTINUED | OUTPATIENT
Start: 2023-01-13 | End: 2023-01-13 | Stop reason: HOSPADM

## 2023-01-13 RX ORDER — BUPIVACAINE HYDROCHLORIDE 2.5 MG/ML
INJECTION, SOLUTION EPIDURAL; INFILTRATION; INTRACAUDAL AS NEEDED
Status: DISCONTINUED | OUTPATIENT
Start: 2023-01-13 | End: 2023-01-13 | Stop reason: HOSPADM

## 2023-01-13 RX ORDER — ONDANSETRON 2 MG/ML
INJECTION INTRAMUSCULAR; INTRAVENOUS AS NEEDED
Status: DISCONTINUED | OUTPATIENT
Start: 2023-01-13 | End: 2023-01-13 | Stop reason: SURG

## 2023-01-13 RX ORDER — PROMETHAZINE HYDROCHLORIDE 12.5 MG/1
25 TABLET ORAL ONCE AS NEEDED
Status: DISCONTINUED | OUTPATIENT
Start: 2023-01-13 | End: 2023-01-13 | Stop reason: HOSPADM

## 2023-01-13 RX ORDER — BACITRACIN ZINC AND POLYMYXIN B SULFATE 500; 1000 [USP'U]/G; [USP'U]/G
OINTMENT TOPICAL
Qty: 28.4 G | Refills: 2 | Status: SHIPPED | OUTPATIENT
Start: 2023-01-13 | End: 2023-03-30

## 2023-01-13 RX ORDER — MIDAZOLAM HYDROCHLORIDE 1 MG/ML
1 INJECTION INTRAMUSCULAR; INTRAVENOUS ONCE
Status: COMPLETED | OUTPATIENT
Start: 2023-01-13 | End: 2023-01-13

## 2023-01-13 RX ORDER — OXYCODONE AND ACETAMINOPHEN 7.5; 325 MG/1; MG/1
1 TABLET ORAL EVERY 6 HOURS PRN
Qty: 20 TABLET | Refills: 0 | Status: SHIPPED | OUTPATIENT
Start: 2023-01-13 | End: 2023-03-28

## 2023-01-13 RX ORDER — ROCURONIUM BROMIDE 10 MG/ML
INJECTION, SOLUTION INTRAVENOUS AS NEEDED
Status: DISCONTINUED | OUTPATIENT
Start: 2023-01-13 | End: 2023-01-13 | Stop reason: SURG

## 2023-01-13 RX ORDER — CEFAZOLIN SODIUM 2 G/100ML
2 INJECTION, SOLUTION INTRAVENOUS ONCE
Status: COMPLETED | OUTPATIENT
Start: 2023-01-13 | End: 2023-01-13

## 2023-01-13 RX ORDER — SUCCINYLCHOLINE/SOD CL,ISO/PF 100 MG/5ML
SYRINGE (ML) INTRAVENOUS AS NEEDED
Status: DISCONTINUED | OUTPATIENT
Start: 2023-01-13 | End: 2023-01-13 | Stop reason: SURG

## 2023-01-13 RX ORDER — PHENYLEPHRINE HCL IN 0.9% NACL 1 MG/10 ML
SYRINGE (ML) INTRAVENOUS AS NEEDED
Status: DISCONTINUED | OUTPATIENT
Start: 2023-01-13 | End: 2023-01-13 | Stop reason: SURG

## 2023-01-13 RX ORDER — PROMETHAZINE HYDROCHLORIDE 25 MG/1
25 SUPPOSITORY RECTAL ONCE AS NEEDED
Status: DISCONTINUED | OUTPATIENT
Start: 2023-01-13 | End: 2023-01-13 | Stop reason: HOSPADM

## 2023-01-13 RX ORDER — GINSENG 100 MG
CAPSULE ORAL AS NEEDED
Status: DISCONTINUED | OUTPATIENT
Start: 2023-01-13 | End: 2023-01-13 | Stop reason: HOSPADM

## 2023-01-13 RX ORDER — PROMETHAZINE HYDROCHLORIDE 12.5 MG/1
12.5 TABLET ORAL ONCE AS NEEDED
Status: DISCONTINUED | OUTPATIENT
Start: 2023-01-13 | End: 2023-01-13 | Stop reason: HOSPADM

## 2023-01-13 RX ORDER — FENTANYL CITRATE 50 UG/ML
INJECTION, SOLUTION INTRAMUSCULAR; INTRAVENOUS AS NEEDED
Status: DISCONTINUED | OUTPATIENT
Start: 2023-01-13 | End: 2023-01-13 | Stop reason: SURG

## 2023-01-13 RX ORDER — MEPERIDINE HYDROCHLORIDE 25 MG/ML
12.5 INJECTION INTRAMUSCULAR; INTRAVENOUS; SUBCUTANEOUS
Status: DISCONTINUED | OUTPATIENT
Start: 2023-01-13 | End: 2023-01-13 | Stop reason: HOSPADM

## 2023-01-13 RX ORDER — SODIUM CHLORIDE, SODIUM LACTATE, POTASSIUM CHLORIDE, CALCIUM CHLORIDE 600; 310; 30; 20 MG/100ML; MG/100ML; MG/100ML; MG/100ML
9 INJECTION, SOLUTION INTRAVENOUS CONTINUOUS PRN
Status: DISCONTINUED | OUTPATIENT
Start: 2023-01-13 | End: 2023-01-13 | Stop reason: HOSPADM

## 2023-01-13 RX ORDER — ONDANSETRON 4 MG/1
4 TABLET, FILM COATED ORAL ONCE AS NEEDED
Status: DISCONTINUED | OUTPATIENT
Start: 2023-01-13 | End: 2023-01-13 | Stop reason: HOSPADM

## 2023-01-13 RX ORDER — PROPOFOL 10 MG/ML
VIAL (ML) INTRAVENOUS AS NEEDED
Status: DISCONTINUED | OUTPATIENT
Start: 2023-01-13 | End: 2023-01-13 | Stop reason: SURG

## 2023-01-13 RX ORDER — DEXAMETHASONE SODIUM PHOSPHATE 4 MG/ML
INJECTION, SOLUTION INTRA-ARTICULAR; INTRALESIONAL; INTRAMUSCULAR; INTRAVENOUS; SOFT TISSUE AS NEEDED
Status: DISCONTINUED | OUTPATIENT
Start: 2023-01-13 | End: 2023-01-13 | Stop reason: SURG

## 2023-01-13 RX ORDER — MAGNESIUM HYDROXIDE 1200 MG/15ML
LIQUID ORAL AS NEEDED
Status: DISCONTINUED | OUTPATIENT
Start: 2023-01-13 | End: 2023-01-13 | Stop reason: HOSPADM

## 2023-01-13 RX ORDER — EPHEDRINE SULFATE 50 MG/ML
INJECTION, SOLUTION INTRAVENOUS AS NEEDED
Status: DISCONTINUED | OUTPATIENT
Start: 2023-01-13 | End: 2023-01-13 | Stop reason: SURG

## 2023-01-13 RX ORDER — ASPIRIN 81 MG/1
81 TABLET ORAL DAILY
Start: 2023-01-16 | End: 2023-02-01 | Stop reason: SDUPTHER

## 2023-01-13 RX ORDER — ACETAMINOPHEN 325 MG/1
650 TABLET ORAL ONCE
Status: DISCONTINUED | OUTPATIENT
Start: 2023-01-13 | End: 2023-01-13 | Stop reason: HOSPADM

## 2023-01-13 RX ADMIN — SODIUM CHLORIDE, POTASSIUM CHLORIDE, SODIUM LACTATE AND CALCIUM CHLORIDE: 600; 310; 30; 20 INJECTION, SOLUTION INTRAVENOUS at 08:51

## 2023-01-13 RX ADMIN — Medication 200 MCG: at 07:52

## 2023-01-13 RX ADMIN — ROCURONIUM BROMIDE 10 MG: 10 INJECTION, SOLUTION INTRAVENOUS at 07:34

## 2023-01-13 RX ADMIN — CEFAZOLIN SODIUM 3 G: 2 INJECTION, SOLUTION INTRAVENOUS at 07:28

## 2023-01-13 RX ADMIN — DEXMEDETOMIDINE HYDROCHLORIDE 10 MCG: 100 INJECTION, SOLUTION, CONCENTRATE INTRAVENOUS at 07:37

## 2023-01-13 RX ADMIN — Medication 100 MCG: at 08:08

## 2023-01-13 RX ADMIN — Medication 100 MG: at 07:34

## 2023-01-13 RX ADMIN — Medication 200 MCG: at 07:48

## 2023-01-13 RX ADMIN — Medication 200 MCG: at 08:16

## 2023-01-13 RX ADMIN — MIDAZOLAM HYDROCHLORIDE 1 MG: 2 INJECTION, SOLUTION INTRAMUSCULAR; INTRAVENOUS at 07:08

## 2023-01-13 RX ADMIN — SODIUM CHLORIDE, POTASSIUM CHLORIDE, SODIUM LACTATE AND CALCIUM CHLORIDE 30 ML/HR: 600; 310; 30; 20 INJECTION, SOLUTION INTRAVENOUS at 07:07

## 2023-01-13 RX ADMIN — GLYCOPYRROLATE 0.2 MG: 0.2 INJECTION INTRAMUSCULAR; INTRAVENOUS at 07:08

## 2023-01-13 RX ADMIN — Medication 200 MCG: at 08:11

## 2023-01-13 RX ADMIN — Medication 200 MCG: at 07:46

## 2023-01-13 RX ADMIN — ONDANSETRON 4 MG: 2 INJECTION INTRAMUSCULAR; INTRAVENOUS at 07:56

## 2023-01-13 RX ADMIN — DEXAMETHASONE SODIUM PHOSPHATE 4 MG: 4 INJECTION, SOLUTION INTRA-ARTICULAR; INTRALESIONAL; INTRAMUSCULAR; INTRAVENOUS; SOFT TISSUE at 07:56

## 2023-01-13 RX ADMIN — SUGAMMADEX 200 MG: 100 INJECTION, SOLUTION INTRAVENOUS at 08:50

## 2023-01-13 RX ADMIN — Medication 200 MCG: at 08:05

## 2023-01-13 RX ADMIN — FENTANYL CITRATE 50 MCG: 50 INJECTION, SOLUTION INTRAMUSCULAR; INTRAVENOUS at 07:34

## 2023-01-13 RX ADMIN — PROPOFOL 200 MG: 10 INJECTION, EMULSION INTRAVENOUS at 07:34

## 2023-01-13 RX ADMIN — Medication 200 MCG: at 08:25

## 2023-01-13 RX ADMIN — DEXMEDETOMIDINE HYDROCHLORIDE 10 MCG: 100 INJECTION, SOLUTION, CONCENTRATE INTRAVENOUS at 07:42

## 2023-01-13 RX ADMIN — EPHEDRINE SULFATE 5 MG: 50 INJECTION INTRAVENOUS at 08:32

## 2023-01-13 RX ADMIN — DEXMEDETOMIDINE HYDROCHLORIDE 20 MCG: 100 INJECTION, SOLUTION, CONCENTRATE INTRAVENOUS at 07:30

## 2023-01-13 RX ADMIN — ROCURONIUM BROMIDE 40 MG: 10 INJECTION, SOLUTION INTRAVENOUS at 07:41

## 2023-01-13 RX ADMIN — FENTANYL CITRATE 50 MCG: 50 INJECTION, SOLUTION INTRAMUSCULAR; INTRAVENOUS at 07:32

## 2023-01-13 NOTE — ANESTHESIA PREPROCEDURE EVALUATION
Anesthesia Evaluation     Patient summary reviewed and Nursing notes reviewed   no history of anesthetic complications:  NPO Solid Status: > 8 hours  NPO Liquid Status: > 2 hours           Airway   Mallampati: III  TM distance: >3 FB  Neck ROM: full  No difficulty expected and Large neck circumference  Dental      Pulmonary - normal exam    breath sounds clear to auscultation  (+) a smoker Former, sleep apnea on CPAP,   Cardiovascular - normal exam  Exercise tolerance: good (4-7 METS)    ECG reviewed  Rhythm: regular  Rate: normal    (+) hypertension, hyperlipidemia,       Neuro/Psych- negative ROS  GI/Hepatic/Renal/Endo    (+)  GERD,  diabetes mellitus type 2,     Musculoskeletal (-) negative ROS    Abdominal    Substance History - negative use     OB/GYN negative ob/gyn ROS         Other - negative ROS       ROS/Med Hx Other: PAT Nursing Notes unavailable.                   Anesthesia Plan    ASA 3     general and MAC     (Patient understands anesthesia not responsible for dental damage.)  intravenous induction     Anesthetic plan, risks, benefits, and alternatives have been provided, discussed and informed consent has been obtained with: patient.    Use of blood products discussed with patient .   Plan discussed with CRNA.        CODE STATUS:

## 2023-01-13 NOTE — ANESTHESIA POSTPROCEDURE EVALUATION
Patient: Quique Valdez    Procedure Summary     Date: 01/13/23 Room / Location: [facility] OR 01 / [facility] MAIN OR    Anesthesia Start: 0727 Anesthesia Stop: 0904    Procedure: Cystoscopy and Circumcision (Penis) Diagnosis:       Phimosis of penis      Benign prostatic hyperplasia with urinary frequency      (Phimosis of penis [N47.1])      (Benign prostatic hyperplasia with urinary frequency [N40.1, R35.0])    Surgeons: Lashonda Vargas MD Provider: Drew Bergman MD    Anesthesia Type: general, MAC ASA Status: 3          Anesthesia Type: general, MAC    Vitals  Vitals Value Taken Time   /77 01/13/23 0914   Temp 36.1 °C (97 °F) 01/13/23 0900   Pulse 83 01/13/23 0916   Resp 14 01/13/23 0910   SpO2 94 % 01/13/23 0916   Vitals shown include unvalidated device data.        Post Anesthesia Care and Evaluation    Patient location during evaluation: bedside  Patient participation: complete - patient participated  Level of consciousness: awake  Pain score: 2  Pain management: adequate    Airway patency: patent  Anesthetic complications: No anesthetic complications  PONV Status: none  Cardiovascular status: acceptable and stable  Respiratory status: acceptable  Hydration status: acceptable    Comments: An Anesthesiologist personally participated in the most demanding procedures (including induction and emergence if applicable) in the anesthesia plan, monitored the course of anesthesia administration at frequent intervals and remained physically present and available for immediate diagnosis and treatment of emergencies.

## 2023-01-13 NOTE — H&P
[facility]   Urology Preop H&P Note    Patient Name: uQique Valdez  : 1950  MRN: [id number]  Primary Care Physician:  Charmaine Pate APRN  Referring Physician: YEFRI Yung  Date of admission: 2023    Subjective   Subjective     Reason for Consult/ Chief Complaint: Phimosis of penis [N47.1]  Benign prostatic hyperplasia with urinary frequency [N40.1, R35.0]    HPI:  Quique Valdez is a 72 y.o. male history ofPhimosis of penis [N47.1]  Benign prostatic hyperplasia with urinary frequency [N40.1, R35.0] who presents for further management OR.  Presents for planned Procedure(s):  CIRCUMCISION and cystoscopy;  .    Risk, benefits, and alternatives discussed with patient prior to today.All questions were addressed after providing time for discussion.  Patient denies significant changes since last visit.  No new complaints today.    Review of Systems   All systems were reviewed and negative except for the above  Personal History     Past Medical History:   Diagnosis Date   • Elevated WBC count    • GERD (gastroesophageal reflux disease)    • High cholesterol    • Hypertension    • Leukocytosis    • Sleep apnea        Past Surgical History:   Procedure Laterality Date   • PROSTATE BIOPSY     • UMBILICAL HERNIA REPAIR         Family History: family history includes Lung cancer in an other family member. Otherwise pertinent FHx was reviewed and not pertinent to current issue.    Social History:  reports that he has quit smoking. He has never used smokeless tobacco. He reports current alcohol use. He reports that he does not currently use drugs.    Home Medications:  amLODIPine-benazepril, aspirin, doxazosin, finasteride, hydroCHLOROthiazide, methocarbamol, omeprazole, oxybutynin XL, pravastatin, sildenafil, and terbinafine    Allergies:  No Known Allergies    Objective    Objective     Vitals:   Temp:  [97.9 °F (36.6 °C)-98.5 °F (36.9 °C)] 97.9 °F (36.6 °C)  Heart Rate:  [84-88] 88  Resp:   [20] 20  BP: (165-172)/() 172/103    Physical Exam:   Constitutional: Awake, alert   Respiratory: Clear, nonlabored respirations    Cardiovascular: Regular rate, no chest retractions   gastrointestinal: Appears soft, nontender     Results:    Assessment & Plan   Assessment / Plan     Brief Patient Summary:  Quique Valdez is a 72 y.o. male     Active Hospital Problems:  Active Hospital Problems    Diagnosis    • Phimosis of penis    • BPH (benign prostatic hyperplasia)        Plan:   Proceed to the OR for planned procedure, Procedure(s):  CIRCUMCISION and cystoscopy,    Risk, benefits, and alternatives discussed with patient at length he is agreeable to proceed  All questions addressed      Electronically signed by Lashonda Vargas MD, 01/13/23, 7:03 AM EST.

## 2023-01-13 NOTE — OP NOTE
Preoperative diagnosis  Phimosis, BPH with LUTS, urinary urgency    Postoperative diagnosis  Phimosis, BPH with LUTS, urinary urgency    Procedure performed  Circumcision and cystoscopy    Attending surgeon  Lashonda Vargas MD     Assistant  Dez Velasquez RN    Anesthesia  General    EBL  10 mL    Complications  None    Specimen  None    Findings  Severe phimosis with adhesions which were released; cystoscopy with prostatomegaly, bilateral coapting lateral lobes, median lobe; posterior bladder wall diverticulum; no significant trabeculations; no masses or tumors    Indications  72 y.o. male agreed to undergo the above named procedure after discussion of the alternatives, risks and benefits.   Informed consent was obtained.      Procedure  Following proper identification patient was taken to the operating room where general anesthetic was given by anesthesia service.  Patient was placed in the supine position.  He was prepped and draped in normal sterile fashion.  Patient noted to have severe phimosis with penile adhesions.  The adhesions were taken down and with some manipulation, the foreskin was retracted.  A marking pen was used to make inner and outer preputial skin markings and a scalpel was used to then excise the inner and outer preputial skin markings.  Bovie electrocautery was used to excise off the redundant foreskin and obtain hemostasis.  Once hemostasis had been achieved, the shaft skin was reapproximated to the coronal collar using a 3-0 chromic in an interrupted fashion.      At this point, flexible cystoscope was assembled.  Red rubber 14 Malay catheter was used to drain the bladder the flexible cystoscope was inserted through the meatus and atraumatically into the bladder without difficulty.  The bladder was inspected in a systematic meridian fashion.  There were no tumors, lesions, or stones.  Patient noted to have a large posterior bladder diverticulum.  There were no trabeculations or other  diverticula noted.  The trigone appeared normal with bilateral orthotopic ureters.  Noted to have significant prostatomegaly upon retrieval and retroflexion of the scope with median lobe and bilateral coapting lateral lobes.  The bladder again was emptied after completion of cystoscopy and the cystoscope removed.    The circumcision incision was again inspected, hemostasis noted to be adequate.  Penile block was administered.  He was then cleaned and dried.  A dressing of bacitracin, Xeroform gauze, Angelica and Coban was placed.    The procedure was then deemed terminated.    Patient tolerated the procedure well.  He was then awakened from anesthesia and taken to the PACU in good condition.    Instrument and sponge count correct x2.      The first assist, Dez Velasquez RN, was present and actively participated throughout the case.      Signed:  Lashonda Vargas MD  01/13/23  09:06 EST

## 2023-01-13 NOTE — DISCHARGE INSTRUCTIONS
DISCHARGE INSTRUCTIONS CYSTOSCOPY      For your surgery you had:  General anesthesia (you may have a sore throat for the first 24 hours)    You may experience dizziness, drowsiness, or lightheadedness for several hours following surgery.  Do not stay alone today or tonight.  Limit your activity for 24 hours.  You should not drive, operate machinery, drink alcohol, or sign legally binding documents for 24 hours or while you are taking pain medication.  Resume your diet slowly.  Follow any special dietary instructions you may have been given by   your doctor.    Last dose of pain medication given at:   .    NOTIFY YOUR DOCTOR IF YOU EXPERIENCE ANY OF THE FOLLOWING:  Temperature greater than 101 degrees Fahrenheit  Shaking Chills  Redness or excessive drainage from incision  Nausea, vomiting and/or pain that is not controlled by prescribed medications  Increase in bleeding or bleeding that is excessive  Unable to urinate in 6 hours after surgery  If unable to reach your doctor, please go to the closest Emergency Room   Following your cystoscopy exam, you may experience burning upon urination.  You may also pass some bloody urine.  If the burning sensation and/or bloody urine should persist beyond 48 hours, call your doctor.  To encourage kidney and bladder function, you should drink as much fluid as possible.  If you have difficulty urinating, try sitting in a bath tub of warm water.  If you become uncomfortable because you cannot urinate, call your doctor or come to the Emergency Room at the hospital.  Medications per physician instructions as indicated on Discharge Medication Information Sheet.        SPECIAL INSTRUCTIONS:

## 2023-01-15 LAB — QT INTERVAL: 384 MS

## 2023-02-01 DIAGNOSIS — K21.9 GASTROESOPHAGEAL REFLUX DISEASE, UNSPECIFIED WHETHER ESOPHAGITIS PRESENT: ICD-10-CM

## 2023-02-01 DIAGNOSIS — N40.1 BENIGN PROSTATIC HYPERPLASIA WITH NOCTURIA: ICD-10-CM

## 2023-02-01 DIAGNOSIS — E78.2 MIXED HYPERLIPIDEMIA: Chronic | ICD-10-CM

## 2023-02-01 DIAGNOSIS — I10 ESSENTIAL HYPERTENSION: Chronic | ICD-10-CM

## 2023-02-01 DIAGNOSIS — R35.1 BENIGN PROSTATIC HYPERPLASIA WITH NOCTURIA: ICD-10-CM

## 2023-02-01 DIAGNOSIS — R73.01 IMPAIRED FASTING GLUCOSE: ICD-10-CM

## 2023-02-01 RX ORDER — SILDENAFIL 100 MG/1
100 TABLET, FILM COATED ORAL DAILY PRN
Qty: 8 TABLET | Refills: 1 | Status: SHIPPED | OUTPATIENT
Start: 2023-02-01

## 2023-02-01 RX ORDER — OMEPRAZOLE 20 MG/1
20 CAPSULE, DELAYED RELEASE ORAL DAILY
Qty: 90 CAPSULE | Refills: 1 | Status: SHIPPED | OUTPATIENT
Start: 2023-02-01

## 2023-02-01 RX ORDER — HYDROCHLOROTHIAZIDE 12.5 MG/1
12.5 TABLET ORAL DAILY
Qty: 90 TABLET | Refills: 1 | Status: SHIPPED | OUTPATIENT
Start: 2023-02-01

## 2023-02-01 RX ORDER — PRAVASTATIN SODIUM 40 MG
40 TABLET ORAL NIGHTLY
Qty: 90 TABLET | Refills: 1 | Status: SHIPPED | OUTPATIENT
Start: 2023-02-01

## 2023-02-01 RX ORDER — AMLODIPINE BESYLATE AND BENAZEPRIL HYDROCHLORIDE 5; 20 MG/1; MG/1
1 CAPSULE ORAL DAILY
Qty: 90 CAPSULE | Refills: 1 | Status: SHIPPED | OUTPATIENT
Start: 2023-02-01

## 2023-02-01 RX ORDER — OXYBUTYNIN CHLORIDE 5 MG/1
10 TABLET, EXTENDED RELEASE ORAL DAILY
Qty: 90 TABLET | Refills: 1 | Status: SHIPPED | OUTPATIENT
Start: 2023-02-01

## 2023-02-01 RX ORDER — ASPIRIN 81 MG/1
81 TABLET ORAL DAILY
Start: 2023-02-01

## 2023-02-01 NOTE — TELEPHONE ENCOUNTER
PATIENT NEED THESE MEDICATIONS:    AMLODIPINE -BENAZEPRIL 5-20MG  ASPIRIN 81MG  HYDROCHLOROTHIAZIDE 12.5MG  OMEPRAZOLE 20MG  OXYBUTYNIN XL 5MG  PRAVASTATIN 40MG  SILDENAFIL 100MG    SENT TO [facility] ON [place]. PLEASE CALL PATIENT ONCE MEDICATION HAS BEEN SENT OVER.

## 2023-02-02 ENCOUNTER — OFFICE VISIT (OUTPATIENT)
Dept: UROLOGY | Facility: CLINIC | Age: 73
End: 2023-02-02
Payer: MEDICARE

## 2023-02-02 VITALS — WEIGHT: 287.8 LBS | BODY MASS INDEX: 41.3 KG/M2

## 2023-02-02 DIAGNOSIS — N47.1 PHIMOSIS: Primary | ICD-10-CM

## 2023-02-02 PROCEDURE — 99212 OFFICE O/P EST SF 10 MIN: CPT | Performed by: UROLOGY

## 2023-02-02 NOTE — PROGRESS NOTES
UROLOGY OFFICE follow-up NOTE    Subjective   HPI  Quique Valdez is a 72 y.o. male.  Presents for postop follow-up after circumcision and cystoscopy, 1/13/2023.    The patient presents today for a follow-up.  The patient is doing well. He states that his incision is healing well.   The patient has been able to urinate in a urinal now standing up. He states that when he sits down, he must make sure it is down.   The patient has not taken his pain medication.   The patient has been back to work. He took 2 weeks off. The patient returned to work on 01/27/2023. He has been wearing sweat pants.    The patient reports that his stream is better. He urinates every 2 hours. The patient goes to bed at 8:00 AM. He sleeps for 3 to 4 hours. The patient has no trouble urinating at all. He states he drinks too much soda.        History of BPH with LUTS, elevated PSA, on finasteride and doxazosin.  _________  Circumcision and cystoscopy, 1/13/2023    Cystoscopic findings with prostatomegaly with, bilateral coapting lateral lobes, median lobe, posterior bladder wall diverticulum; no significant trabeculations; no masses or tumors    PSA trend  11/22/2022: 7.48 (on finasteride)  11/24/21: 8.67  10/2020: 11.28  6/2019: 16.45  11/2017: 8.3  1/2017: 7.5  6/2016: 7.2  11/2015: 7.2  6/15: 7.2  3/2015: 7.8     Prostate biopsy: 6/18/2015-right and left negative for malignancy     MRI prostate 9/10/2019: 80 g prostate; PIRAD II x2 lesions; No suspicious lesions (PIRAD IV or V) for malignancy    Review of systems  A review of systems was performed, and positive findings are noted in the HPI.    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 Phimosis (Primary)      BPH with LUTS-continue finasteride and doxazosin    Elevated PSA-plan to recheck 6 months from previous, 5/2023    ED-continue Viagra    Urinary urgency-cystoscopic findings discussed with patient; discussed behavioral modifications, continue to monitor, consider medical management    Postop circumcision  The patient is healing well.  He can discontinue use of the salve.  He can use a hair dryer to promote healing, keep area clean and dry.  I advised him to avoid sexual intercourse for at least 1 month until completely healed    Urinary urgency  He is currently taking oxybutynin, doxazosin, finasteride, and a diuretic.  He had an 80 g prostate on MRI in 2019.  We could consider trying a different medication for urgency, but he declined at this time.  Continue to monitor    Patient encouraged to follow-up 1 month  All questions addressed          Transcribed from ambient dictation for Lashonda Vargas MD by Marcela Telles.  02/02/23   16:58 EST    Patient or patient representative verbalized consent to the visit recording.  I have personally performed the services described in this document as transcribed by the above individual, and it is both accurate and complete.

## 2023-03-08 ENCOUNTER — OFFICE VISIT (OUTPATIENT)
Dept: UROLOGY | Facility: CLINIC | Age: 73
End: 2023-03-08
Payer: MEDICARE

## 2023-03-08 VITALS — WEIGHT: 291.6 LBS | HEIGHT: 70 IN | RESPIRATION RATE: 16 BRPM | BODY MASS INDEX: 41.75 KG/M2

## 2023-03-08 DIAGNOSIS — N47.1 PHIMOSIS: Primary | ICD-10-CM

## 2023-03-08 PROCEDURE — 99212 OFFICE O/P EST SF 10 MIN: CPT | Performed by: UROLOGY

## 2023-03-08 NOTE — PROGRESS NOTES
UROLOGY OFFICE follow-up NOTE    Subjective   HPI  Quique Valdez is a 72 y.o. male. Patient presents for postop follow-up circumcision cystoscopy, 1/13/2023.    Doing well; incision healed up. Has masturbated.  Urgency is stable. Still taking all uro meds.      History of BPH with LUTS, elevated PSA, on finasteride and doxazosin.    _________  Circumcision and cystoscopy, 1/13/2023     Cystoscopic findings with prostatomegaly with, bilateral coapting lateral lobes, median lobe, posterior bladder wall diverticulum; no significant trabeculations; no masses or tumors     PSA trend  11/22/2022: 7.48 (on finasteride)  11/24/21: 8.67  10/2020: 11.28  6/2019: 16.45  11/2017: 8.3  1/2017: 7.5  6/2016: 7.2  11/2015: 7.2  6/15: 7.2  3/2015: 7.8     Prostate biopsy: 6/18/2015-right and left negative for malignancy     MRI prostate 9/10/2019: 80 g prostate; PIRAD II x2 lesions; No suspicious lesions (PIRAD IV or V) for malignancy      Results for orders placed or performed during the hospital encounter of 01/13/23   POC Glucose Once    Specimen: Blood   Result Value Ref Range    Glucose 147 (H) 70 - 99 mg/dL   ECG 12 Lead Pre-Op / Pre-Procedure   Result Value Ref Range    QT Interval 384 ms     Review of systems  A review of systems was performed, and positive findings are noted in the HPI.    Objective     Vital Signs:   There were no vitals taken for this visit.      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 Phimosis (Primary)      BPH with LUTS-continue finasteride and doxazosin     Elevated PSA-plan to recheck 6 months from previous, 5/2023     ED-continue Viagra     Urinary urgency- continue behavioral modifications, continue to monitor, oxybutynin     Postop circumcision healed well; activity as tolerated       Patient encouraged to follow-up May, with psaprior  All questions addressed

## 2023-03-27 ENCOUNTER — LAB (OUTPATIENT)
Dept: LAB | Facility: HOSPITAL | Age: 73
End: 2023-03-27
Payer: MEDICARE

## 2023-03-27 DIAGNOSIS — D72.829 LEUKOCYTOSIS, UNSPECIFIED TYPE: ICD-10-CM

## 2023-03-27 DIAGNOSIS — E11.9 TYPE 2 DIABETES MELLITUS WITHOUT COMPLICATION, WITHOUT LONG-TERM CURRENT USE OF INSULIN: ICD-10-CM

## 2023-03-27 LAB
ALBUMIN SERPL-MCNC: 4.3 G/DL (ref 3.5–5.2)
ALBUMIN/GLOB SERPL: 1.8 G/DL
ALP SERPL-CCNC: 123 U/L (ref 39–117)
ALT SERPL W P-5'-P-CCNC: 17 U/L (ref 1–41)
ANION GAP SERPL CALCULATED.3IONS-SCNC: 12.2 MMOL/L (ref 5–15)
AST SERPL-CCNC: 14 U/L (ref 1–40)
BILIRUB SERPL-MCNC: 0.6 MG/DL (ref 0–1.2)
BUN SERPL-MCNC: 17 MG/DL (ref 8–23)
BUN/CREAT SERPL: 14.8 (ref 7–25)
CALCIUM SPEC-SCNC: 9.3 MG/DL (ref 8.6–10.5)
CHLORIDE SERPL-SCNC: 99 MMOL/L (ref 98–107)
CHOLEST SERPL-MCNC: 182 MG/DL (ref 0–200)
CO2 SERPL-SCNC: 27.8 MMOL/L (ref 22–29)
CREAT SERPL-MCNC: 1.15 MG/DL (ref 0.76–1.27)
DEPRECATED RDW RBC AUTO: 41.3 FL (ref 37–54)
EGFRCR SERPLBLD CKD-EPI 2021: 68.5 ML/MIN/1.73
ERYTHROCYTE [DISTWIDTH] IN BLOOD BY AUTOMATED COUNT: 13.2 % (ref 12.3–15.4)
GLOBULIN UR ELPH-MCNC: 2.4 GM/DL
GLUCOSE SERPL-MCNC: 100 MG/DL (ref 65–99)
HBA1C MFR BLD: 6.9 % (ref 4.8–5.6)
HCT VFR BLD AUTO: 42.8 % (ref 37.5–51)
HDLC SERPL-MCNC: 49 MG/DL (ref 40–60)
HGB BLD-MCNC: 13.9 G/DL (ref 13–17.7)
LDH SERPL-CCNC: 174 U/L (ref 135–225)
LDLC SERPL CALC-MCNC: 113 MG/DL (ref 0–100)
LDLC/HDLC SERPL: 2.26 {RATIO}
LYMPHOCYTES # BLD MANUAL: 15.2 10*3/MM3 (ref 0.7–3.1)
LYMPHOCYTES NFR BLD MANUAL: 8 % (ref 5–12)
MCH RBC QN AUTO: 28.2 PG (ref 26.6–33)
MCHC RBC AUTO-ENTMCNC: 32.5 G/DL (ref 31.5–35.7)
MCV RBC AUTO: 86.8 FL (ref 79–97)
METAMYELOCYTES NFR BLD MANUAL: 1 % (ref 0–0)
MONOCYTES # BLD: 1.93 10*3/MM3 (ref 0.1–0.9)
NEUTROPHILS # BLD AUTO: 6.75 10*3/MM3 (ref 1.7–7)
NEUTROPHILS NFR BLD MANUAL: 28 % (ref 42.7–76)
PATHOLOGY REVIEW: YES
PLATELET # BLD AUTO: 233 10*3/MM3 (ref 140–450)
PMV BLD AUTO: 10.4 FL (ref 6–12)
POTASSIUM SERPL-SCNC: 4.1 MMOL/L (ref 3.5–5.2)
PROT SERPL-MCNC: 6.7 G/DL (ref 6–8.5)
RBC # BLD AUTO: 4.93 10*6/MM3 (ref 4.14–5.8)
RBC MORPH BLD: NORMAL
SMALL PLATELETS BLD QL SMEAR: ADEQUATE
SODIUM SERPL-SCNC: 139 MMOL/L (ref 136–145)
TRIGL SERPL-MCNC: 112 MG/DL (ref 0–150)
TSH SERPL DL<=0.05 MIU/L-ACNC: 2.24 UIU/ML (ref 0.27–4.2)
VARIANT LYMPHS NFR BLD MANUAL: 55 % (ref 19.6–45.3)
VARIANT LYMPHS NFR BLD MANUAL: 8 % (ref 0–5)
VLDLC SERPL-MCNC: 20 MG/DL (ref 5–40)
WBC MORPH BLD: NORMAL
WBC NRBC COR # BLD: 24.12 10*3/MM3 (ref 3.4–10.8)

## 2023-03-27 PROCEDURE — 85025 COMPLETE CBC W/AUTO DIFF WBC: CPT

## 2023-03-27 PROCEDURE — 84443 ASSAY THYROID STIM HORMONE: CPT

## 2023-03-27 PROCEDURE — 85007 BL SMEAR W/DIFF WBC COUNT: CPT

## 2023-03-27 PROCEDURE — 80061 LIPID PANEL: CPT

## 2023-03-27 PROCEDURE — 80053 COMPREHEN METABOLIC PANEL: CPT

## 2023-03-27 PROCEDURE — 36415 COLL VENOUS BLD VENIPUNCTURE: CPT

## 2023-03-27 PROCEDURE — 83036 HEMOGLOBIN GLYCOSYLATED A1C: CPT

## 2023-03-27 PROCEDURE — 83615 LACTATE (LD) (LDH) ENZYME: CPT

## 2023-03-28 ENCOUNTER — OFFICE VISIT (OUTPATIENT)
Dept: PODIATRY | Facility: CLINIC | Age: 73
End: 2023-03-28
Payer: MEDICARE

## 2023-03-28 VITALS
BODY MASS INDEX: 41.8 KG/M2 | HEIGHT: 70 IN | SYSTOLIC BLOOD PRESSURE: 186 MMHG | WEIGHT: 292 LBS | TEMPERATURE: 98.7 F | DIASTOLIC BLOOD PRESSURE: 107 MMHG | HEART RATE: 67 BPM | OXYGEN SATURATION: 94 %

## 2023-03-28 DIAGNOSIS — B35.1 ONYCHOMYCOSIS: ICD-10-CM

## 2023-03-28 DIAGNOSIS — M79.672 FOOT PAIN, BILATERAL: Primary | ICD-10-CM

## 2023-03-28 DIAGNOSIS — G62.9 NEUROPATHY: ICD-10-CM

## 2023-03-28 DIAGNOSIS — M79.671 FOOT PAIN, BILATERAL: Primary | ICD-10-CM

## 2023-03-28 DIAGNOSIS — L60.0 ONYCHOCRYPTOSIS: ICD-10-CM

## 2023-03-28 LAB
CYTO UR: NORMAL
LAB AP CASE REPORT: NORMAL
LAB AP CLINICAL INFORMATION: NORMAL
PATH REPORT.FINAL DX SPEC: NORMAL
PATH REPORT.GROSS SPEC: NORMAL

## 2023-03-28 PROCEDURE — 11721 DEBRIDE NAIL 6 OR MORE: CPT | Performed by: PODIATRIST

## 2023-03-28 PROCEDURE — 3077F SYST BP >= 140 MM HG: CPT | Performed by: PODIATRIST

## 2023-03-28 PROCEDURE — 3080F DIAST BP >= 90 MM HG: CPT | Performed by: PODIATRIST

## 2023-03-28 PROCEDURE — 1159F MED LIST DOCD IN RCRD: CPT | Performed by: PODIATRIST

## 2023-03-28 PROCEDURE — 1160F RVW MEDS BY RX/DR IN RCRD: CPT | Performed by: PODIATRIST

## 2023-03-28 NOTE — PROGRESS NOTES
[facility] - PODIATRY    Today's Date: 03/28/23    Patient Name: Quique Valdez  MRN: [id number]  CSN: [id number]  PCP: Charmaine Pate APRN, Last PCP Visit:  2/1/2023  Referring Provider: No ref. provider found    SUBJECTIVE     Chief Complaint   Patient presents with   • Right Foot - Follow-up, Nail Problem, Numbness   • Left Foot - Follow-up, Nail Problem, Numbness     HPI: Quique Valdez, a 72 y.o.male, comes to clinic.    New, Established, New Problem:  Established  Location:  Toenails  Duration:   Greater than five years  Onset:  Gradual  Nature:  sore with palpation.  Stable, worsening, improving:   Improving  Aggravating factors:  Pain with shoe gear and ambulation.  Previous Treatment:  debridement    Medical changes: Neurology procedure.    Patient denies any fevers, chills, nausea, vomiting, shortness of breath, nor any other constitutional signs nor symptoms.       Past Medical History:   Diagnosis Date   • Elevated WBC count    • GERD (gastroesophageal reflux disease)    • High cholesterol    • Hypertension    • Leukocytosis    • Sleep apnea      Past Surgical History:   Procedure Laterality Date   • CIRCUMCISION N/A 1/13/2023    Procedure: Cystoscopy and Circumcision;  Surgeon: Lashonda Vargas MD;  Location: [facility];  Service: Urology;  Laterality: N/A;   • PROSTATE BIOPSY     • UMBILICAL HERNIA REPAIR       Family History   Problem Relation Age of Onset   • Lung cancer Other      Social History     Socioeconomic History   • Marital status:    • Number of children: 0   Tobacco Use   • Smoking status: Former   • Smokeless tobacco: Never   • Tobacco comments:     10-25 PACK YEARS   Vaping Use   • Vaping Use: Never used   Substance and Sexual Activity   • Alcohol use: Yes     Comment: occasional   • Drug use: Not Currently   • Sexual activity: Defer     No Known Allergies  Current Outpatient Medications   Medication Sig Dispense Refill   • amLODIPine-benazepril  (LOTREL 5-20) 5-20 MG per capsule Take 1 capsule by mouth Daily. 90 capsule 1   • aspirin 81 MG EC tablet Take 1 tablet by mouth Daily.     • bacitracin-polymyxin b (POLYSPORIN) 500-95441 UNIT/GM ointment Apply to 3 times daily once dressing removed 28.4 g 2   • finasteride (PROSCAR) 5 MG tablet Take 1 tablet by mouth Daily. 90 tablet 2   • hydroCHLOROthiazide (HYDRODIURIL) 12.5 MG tablet Take 1 tablet by mouth Daily. 90 tablet 1   • methocarbamol (ROBAXIN) 750 MG tablet Take 1 tablet by mouth 3 (Three) Times a Day. 90 tablet 3   • omeprazole (priLOSEC) 20 MG capsule Take 1 capsule by mouth Daily. 90 capsule 1   • oxybutynin XL (DITROPAN-XL) 5 MG 24 hr tablet Take 2 tablets by mouth Daily. 90 tablet 1   • pravastatin (PRAVACHOL) 40 MG tablet Take 1 tablet by mouth Every Night. 90 tablet 1   • sildenafil (VIAGRA) 100 MG tablet Take 1 tablet by mouth Daily As Needed for Erectile Dysfunction. 8 tablet 1   • terbinafine (LamISIL) 250 MG tablet Take 1 tablet by mouth Daily for 90 days. 90 tablet 0   • doxazosin (Cardura) 4 MG tablet Take 1 tablet by mouth Daily for 90 days. 90 tablet 1     No current facility-administered medications for this visit.     Review of Systems   Constitutional: Negative.    Skin:        Painful toenails   Neurological: Positive for numbness.   All other systems reviewed and are negative.      OBJECTIVE     Vitals:    03/28/23 0823   BP: (!) 186/107   Pulse:    Temp:    SpO2:        Patient seen in no apparent distress.      PHYSICAL EXAM:     Foot/Ankle Exam:       General:   Appearance: obesity and elderly    Appearance comment:  Chronically ill  Orientation: AAOx3    Affect: appropriate    Gait: unimpaired    Shoe Gear:  Casual shoes    VASCULAR      Right Foot Vascularity   Normal vascular exam    Dorsalis pedis:  2+  Posterior tibial:  2+  Skin Temperature: warm    Edema Grading:  None  CFT:  < 3 seconds  Pedal Hair Growth:  Absent  Varicosities: moderate varicosities       Left Foot  Vascularity   Normal vascular exam    Dorsalis pedis:  2+  Posterior tibial:  2+  Skin Temperature: warm    Edema Grading:  None  CFT:  < 3 seconds  Pedal Hair Growth:  Present  Varicosities: none        NEUROLOGIC     Right Foot Neurologic   Light touch sensation:  Diminished  Vibratory sensation:  Diminished  Hot/Cold sensation: diminished    Protective Sensation using Wolford-Jaylan Monofilament:  4     Left Foot Neurologic   Light touch sensation:  Diminished  Vibratory sensation:  Diminished  Hot/cold sensation: diminished    Protective Sensation using Wolford-Jaylan Monofilament:  4     MUSCLE STRENGTH     Right Foot Muscle Strength   Foot dorsiflexion:  4  Foot plantar flexion:  4  Foot inversion:  4  Foot eversion:  4     Left Foot Muscle Strength   Foot dorsiflexion:  4  Foot plantar flexion:  4  Foot inversion:  4  Foot eversion:  4     RANGE OF MOTION      Right Foot Range of Motion   Foot and ankle ROM within normal limits       Left Foot Range of Motion   Foot and ankle ROM within normal limits       DERMATOLOGIC     Right Foot Dermatologic   Skin: skin intact    Nails: onychomycosis, abnormally thick, subungual debris and dystrophic nails    Nails comment:  Toenails 2, 3, 4, and 5     Left Foot Dermatologic   Skin: skin intact    Nails: onychomycosis, abnormally thick, subungual debris, dystrophic nails and ingrown toenail    Nails comment:  Toenails 2, 3, 4, and 5    Lab Results   Component Value Date    GLUCOSE 100 (H) 03/27/2023    BUN 17 03/27/2023    CREATININE 1.15 03/27/2023    EGFRIFNONA 70 09/20/2021    BCR 14.8 03/27/2023    K 4.1 03/27/2023    CO2 27.8 03/27/2023    CALCIUM 9.3 03/27/2023    ALBUMIN 4.3 03/27/2023    LABIL2 1.9 12/14/2020    AST 14 03/27/2023    ALT 17 03/27/2023         ASSESSMENT/PLAN     Diagnoses and all orders for this visit:    1. Foot pain, bilateral (Primary)    2. Onychocryptosis    3. Onychomycosis    4. Neuropathy        Comprehensive lower extremity  examination and evaluation was performed.    Discussed findings and treatment plan including risks, benefits, and treatment options with patient in detail. Patient agreed with treatment plan.    Toenails 2, 3, 4, 5 on Right and 2, 3, 4, 5 on Left were debrided with nail nippers then filed with a Dremel nail brinda.  Patient tolerated procedure well without complications.    An After Visit Summary was printed and given to the patient at discharge, including (if requested) any available informative/educational handouts regarding diagnosis, treatment, or medications. All questions were answered to patient/family satisfaction. Should symptoms fail to improve or worsen they agree to call or return to clinic or to go to the Emergency Department. Discussed the importance of following up with any needed screening tests/labs/specialist appointments and any requested follow-up recommended by me today. Importance of maintaining follow-up discussed and patient accepts that missed appointments can delay diagnosis and potentially lead to worsening of conditions.    Return in about 9 weeks (around 5/30/2023) for Toenail Care., or sooner if acute issues arise.    This document has been electronically signed by Jonathan Javed DPM on March 28, 2023 08:41 EDT

## 2023-03-30 ENCOUNTER — OFFICE VISIT (OUTPATIENT)
Dept: INTERNAL MEDICINE | Facility: CLINIC | Age: 73
End: 2023-03-30
Payer: MEDICARE

## 2023-03-30 VITALS
HEIGHT: 70 IN | HEART RATE: 94 BPM | DIASTOLIC BLOOD PRESSURE: 94 MMHG | SYSTOLIC BLOOD PRESSURE: 174 MMHG | TEMPERATURE: 98.7 F | RESPIRATION RATE: 18 BRPM | OXYGEN SATURATION: 98 % | BODY MASS INDEX: 41.8 KG/M2 | WEIGHT: 292 LBS

## 2023-03-30 DIAGNOSIS — I10 PRIMARY HYPERTENSION: Primary | Chronic | ICD-10-CM

## 2023-03-30 DIAGNOSIS — G89.29 CHRONIC PAIN OF RIGHT KNEE: ICD-10-CM

## 2023-03-30 DIAGNOSIS — R07.9 CHEST PAIN, UNSPECIFIED TYPE: ICD-10-CM

## 2023-03-30 DIAGNOSIS — M25.561 CHRONIC PAIN OF RIGHT KNEE: ICD-10-CM

## 2023-03-30 DIAGNOSIS — E78.2 MIXED HYPERLIPIDEMIA: Chronic | ICD-10-CM

## 2023-03-30 DIAGNOSIS — E11.9 TYPE 2 DIABETES MELLITUS WITHOUT COMPLICATION, WITHOUT LONG-TERM CURRENT USE OF INSULIN: ICD-10-CM

## 2023-03-30 RX ORDER — BLOOD PRESSURE TEST KIT-LARGE
1 KIT MISCELLANEOUS DAILY
Qty: 1 EACH | Refills: 0 | Status: SHIPPED | OUTPATIENT
Start: 2023-03-30

## 2023-03-30 RX ORDER — METFORMIN HYDROCHLORIDE 500 MG/1
1000 TABLET, EXTENDED RELEASE ORAL
Qty: 180 TABLET | Refills: 0 | Status: SHIPPED | OUTPATIENT
Start: 2023-03-30

## 2023-03-30 NOTE — PROGRESS NOTES
"Chief Complaint  Hypertension (6 month follow up), Diabetes, Cough (1 week and a half ( coughing up green mucus )), Nasal Congestion, Headache, and Knee Pain (left Knee -Seen in  back in January (XR normal) Ortho referral  )    Subjective          Quique Valdez presents to [facility]  History of Present Illness  HTN-elevated today, due for medication now and having coughing fits  Not checking, was not able to get monitor from [place]  Reports occasional pain in his left chest and pain in left arm, occasional numbness in left side of face. Reports intermittent symptoms over the last 6 mths, occurring about once per week, random, sometimes at rest. Goes away on its own, last 1 hr sometimes.  Denies chest pain at present. Last episode over 1 wk ago. Occasional soa with activity, denies association with chest pain    Left knee pain-injured in January. Was seen at urgent care. He reports that pain has not improved. Using a brace when he is out. Now occasionally having pain in this left hip and right knee.     Cough, congestion x10 days, reports that cold symptoms have improved  She reports congestion improves as the day goes on  Denies feeling soa  Denies fever, chills, body aches    Reports numbness and tingling of toes ongoing for years, denies worsening    He reports fatigue and sometimes lack of motivation  He reports sleeping well  Objective   Vital Signs:   /94 (BP Location: Right arm, Patient Position: Sitting, Cuff Size: Adult)   Pulse 94   Temp 98.7 °F (37.1 °C)   Resp 18   Ht 177.8 cm (70\")   Wt 132 kg (292 lb)   SpO2 98%   BMI 41.90 kg/m²     Physical Exam  Vitals and nursing note reviewed.   Constitutional:       General: He is not in acute distress.     Appearance: Normal appearance.   HENT:      Head: Normocephalic and atraumatic.      Right Ear: External ear normal.      Left Ear: External ear normal.      Nose: Nose normal.      " Mouth/Throat:      Mouth: Mucous membranes are moist.   Eyes:      Conjunctiva/sclera: Conjunctivae normal.   Cardiovascular:      Rate and Rhythm: Normal rate and regular rhythm.      Pulses: Normal pulses.      Heart sounds: Normal heart sounds. No murmur heard.    No friction rub. No gallop.   Pulmonary:      Effort: Pulmonary effort is normal. No respiratory distress.      Breath sounds: No wheezing, rhonchi or rales.   Musculoskeletal:      Cervical back: Neck supple.   Skin:     General: Skin is warm and dry.   Neurological:      General: No focal deficit present.      Mental Status: He is alert and oriented to person, place, and time.   Psychiatric:         Mood and Affect: Mood normal.         Behavior: Behavior normal.        Result Review :            ECG 12 Lead    Date/Time: 3/30/2023 9:02 AM  Performed by: Charmaine Pate APRN  Authorized by: Charmaine Pate APRN   Comparison: not compared with previous ECG   Rhythm: sinus rhythm  Rate: normal  BPM: 62  ST Segments: ST segments normal  T flattening: II, III and aVF  QRS axis: normal  Other findings: T wave abnormality              Assessment and Plan    Diagnoses and all orders for this visit:    1. Primary hypertension (Primary)  Comments:  Discussed uncontrolled he will get monitor and begin checking daily.  Discussed goal less than 130/80. call with readings >150/90.     2. Chronic pain of right knee  Comments:  MRI and Ortho eval  Orders:  -     Ambulatory Referral to Orthopedic Surgery  -     MRI Knee Right Without Contrast; Future    3. Chest pain, unspecified type  Comments:  EKG in the office today unremarkable.  Sending for cardiology eval and likely stress test.  Patient to go to the ER for eval if pain returns  Orders:  -     Ambulatory Referral to Cardiology  -     ECG 12 Lead    4. Type 2 diabetes mellitus without complication, without long-term current use of insulin (HCC)  Comments:  Starting metformin at this time.  May consider GLP-1 to  help with weight loss at follow-up    5. Mixed hyperlipidemia  Comments:  Slightly improved although still above goal.  Discussed that with diagnosis of diabetes goal is now less than 70 for LDL.  We will likely start statin at f/u    Other orders  -     Blood Pressure Monitoring (Blood Pressure Monitor 3) device; 1 each Daily.  Dispense: 1 each; Refill: 0  -     metFORMIN ER (GLUCOPHAGE-XR) 500 MG 24 hr tablet; Take 2 tablets by mouth Daily With Breakfast.  Dispense: 180 tablet; Refill: 0            I spent 45 minutes caring for Quique on this date of service. This time includes time spent by me in the following activities:reviewing tests, performing a medically appropriate examination and/or evaluation , counseling and educating the patient/family/caregiver, ordering medications, tests, or procedures, referring and communicating with other health care professionals , documenting information in the medical record and independently interpreting results and communicating that information with the patient/family/caregiver  Follow Up   Return in about 4 weeks (around 4/27/2023).  Patient was given instructions and counseling regarding his condition or for health maintenance advice. Please see specific information pulled into the AVS if appropriate.

## 2023-04-06 ENCOUNTER — OFFICE VISIT (OUTPATIENT)
Dept: ORTHOPEDIC SURGERY | Facility: CLINIC | Age: 73
End: 2023-04-06
Payer: MEDICARE

## 2023-04-06 VITALS — BODY MASS INDEX: 41.8 KG/M2 | WEIGHT: 292 LBS | HEIGHT: 70 IN

## 2023-04-06 DIAGNOSIS — M25.562 LEFT KNEE PAIN, UNSPECIFIED CHRONICITY: Primary | ICD-10-CM

## 2023-04-06 DIAGNOSIS — M17.12 OSTEOARTHRITIS OF LEFT KNEE, UNSPECIFIED OSTEOARTHRITIS TYPE: ICD-10-CM

## 2023-04-06 PROCEDURE — 99203 OFFICE O/P NEW LOW 30 MIN: CPT | Performed by: ORTHOPAEDIC SURGERY

## 2023-04-06 PROCEDURE — 20610 DRAIN/INJ JOINT/BURSA W/O US: CPT | Performed by: ORTHOPAEDIC SURGERY

## 2023-04-06 RX ORDER — METHYLPREDNISOLONE ACETATE 80 MG/ML
80 INJECTION, SUSPENSION INTRA-ARTICULAR; INTRALESIONAL; INTRAMUSCULAR; SOFT TISSUE
Status: COMPLETED | OUTPATIENT
Start: 2023-04-06 | End: 2023-04-06

## 2023-04-06 RX ORDER — LIDOCAINE HYDROCHLORIDE 10 MG/ML
5 INJECTION, SOLUTION EPIDURAL; INFILTRATION; INTRACAUDAL; PERINEURAL
Status: COMPLETED | OUTPATIENT
Start: 2023-04-06 | End: 2023-04-06

## 2023-04-06 RX ORDER — DICLOFENAC SODIUM 75 MG/1
75 TABLET, DELAYED RELEASE ORAL 2 TIMES DAILY
Qty: 60 TABLET | Refills: 1 | Status: SHIPPED | OUTPATIENT
Start: 2023-04-06

## 2023-04-06 RX ADMIN — LIDOCAINE HYDROCHLORIDE 5 ML: 10 INJECTION, SOLUTION EPIDURAL; INFILTRATION; INTRACAUDAL; PERINEURAL at 09:04

## 2023-04-06 RX ADMIN — METHYLPREDNISOLONE ACETATE 80 MG: 80 INJECTION, SUSPENSION INTRA-ARTICULAR; INTRALESIONAL; INTRAMUSCULAR; SOFT TISSUE at 09:04

## 2023-04-06 NOTE — PROGRESS NOTES
"Chief Complaint  Initial Evaluation of the Left Knee     Subjective      Quique Valdez presents to [facility] for initial evaluation of the left knee. He has had problems for years.  In January he was cleaning his garage on his hands and knees and had difficulty getting up.  He has had increase popping, swelling and pain since then.  He went to the  in January and was given a knee brace he wears at times.     No Known Allergies     Social History     Socioeconomic History   • Marital status:    • Number of children: 0   Tobacco Use   • Smoking status: Former   • Smokeless tobacco: Never   • Tobacco comments:     10-25 PACK YEARS   Vaping Use   • Vaping Use: Never used   Substance and Sexual Activity   • Alcohol use: Yes     Comment: occasional   • Drug use: Not Currently   • Sexual activity: Defer        Review of Systems     Objective   Vital Signs:   Ht 177.8 cm (70\")   Wt 132 kg (292 lb)   BMI 41.90 kg/m²       Physical Exam  Constitutional:       Appearance: Normal appearance. Patient is well-developed and normal weight.   HENT:      Head: Normocephalic.      Right Ear: Hearing and external ear normal.      Left Ear: Hearing and external ear normal.      Nose: Nose normal.   Eyes:      Conjunctiva/sclera: Conjunctivae normal.   Cardiovascular:      Rate and Rhythm: Normal rate.   Pulmonary:      Effort: Pulmonary effort is normal.      Breath sounds: No wheezing or rales.   Abdominal:      Palpations: Abdomen is soft.      Tenderness: There is no abdominal tenderness.   Musculoskeletal:      Cervical back: Normal range of motion.   Skin:     Findings: No rash.   Neurological:      Mental Status: Patient is alert and oriented to person, place, and time.   Psychiatric:         Mood and Affect: Mood and affect normal.         Judgment: Judgment normal.       Ortho Exam      LEFT KNEE Flexion 120. Extension 0. Stable to varus/valgus stress. Stable to anterior/posterior " drawer. Neurovascularly intact. Negative Corry. Negative Lachman. Positive EHL, FHL, HS and TA. Sensation intact to light touch all 5 nerves of the foot. Ambulates with Non-antalgic gait. Patella is well tracking. Calf supple, non-tender. Positive tenderness to the medial joint line. Positive tenderness to the lateral joint line. Positive Crepitus. Good strength to hamstrings, quadriceps, dorsiflexors, and plantar flexors.  Knee Extensor Mechanism intact       Large Joint Arthrocentesis: L knee  Date/Time: 4/6/2023 9:04 AM  Consent given by: patient  Site marked: site marked  Timeout: Immediately prior to procedure a time out was called to verify the correct patient, procedure, equipment, support staff and site/side marked as required   Supporting Documentation  Indications: pain   Procedure Details  Location: knee - L knee  Preparation: Patient was prepped and draped in the usual sterile fashion  Needle gauge: 21g.  Medications administered: 5 mL lidocaine PF 1% 1 %; 80 mg methylPREDNISolone acetate 80 MG/ML  Patient tolerance: patient tolerated the procedure well with no immediate complications            Imaging Results (Most Recent)     None           Result Review :               Assessment and Plan     Diagnoses and all orders for this visit:    1. Left knee pain, unspecified chronicity (Primary)    2. Osteoarthritis of left knee, unspecified osteoarthritis type      Discussed the treatment plan with the patient. I reviewed the X-rays that were obtained 1/12/23 with the patient. Discussed the risks and benefits of conservative measures.  The patient expressed understanding and wished to proceed with a left knee steroid injection.  He tolerated the injection well. HEP exercises.  He has approval for MRI.  He will proceed if the injection does not give relief.     Call or return if worsening symptoms.    Follow Up     PRN      Patient was given instructions and counseling regarding his condition or for health  maintenance advice. Please see specific information pulled into the AVS if appropriate.     Scribed for Segundo Hooker MD by Arelis Mcdonald MA.  04/06/23   08:42 EDT    I have personally performed the services described in this document as scribed by the above individual and it is both accurate and complete. Segundo Hooker MD 04/06/23

## 2023-04-12 ENCOUNTER — TELEPHONE (OUTPATIENT)
Dept: INTERNAL MEDICINE | Facility: CLINIC | Age: 73
End: 2023-04-12
Payer: MEDICARE

## 2023-04-12 ENCOUNTER — OFFICE VISIT (OUTPATIENT)
Dept: ONCOLOGY | Facility: HOSPITAL | Age: 73
End: 2023-04-12
Payer: MEDICARE

## 2023-04-12 VITALS
HEART RATE: 86 BPM | TEMPERATURE: 98 F | RESPIRATION RATE: 18 BRPM | SYSTOLIC BLOOD PRESSURE: 150 MMHG | DIASTOLIC BLOOD PRESSURE: 67 MMHG | HEIGHT: 70 IN | BODY MASS INDEX: 40.08 KG/M2 | OXYGEN SATURATION: 96 % | WEIGHT: 279.98 LBS

## 2023-04-12 DIAGNOSIS — D72.829 LEUKOCYTOSIS, UNSPECIFIED TYPE: ICD-10-CM

## 2023-04-12 DIAGNOSIS — D47.Z9 LOW GRADE B CELL LYMPHOPROLIFERATIVE DISORDER: Primary | ICD-10-CM

## 2023-04-12 DIAGNOSIS — I10 PRIMARY HYPERTENSION: Primary | ICD-10-CM

## 2023-04-12 PROCEDURE — G0463 HOSPITAL OUTPT CLINIC VISIT: HCPCS | Performed by: INTERNAL MEDICINE

## 2023-04-12 RX ORDER — ADHESIVE BANDAGE 3/4"
BANDAGE TOPICAL
Qty: 1 EACH | Refills: 0 | Status: SHIPPED | OUTPATIENT
Start: 2023-04-12

## 2023-04-12 NOTE — PROGRESS NOTES
Patient  Quique Valdez    Location  [facility] & [facility]    Chief Complaint  Leukocytosis, unspecified type    Referring Provider: YEFRI Yung  PCP: Charmaine Pate APRN    Subjective          Oncology/Hematology History Overview Note     CD 5+ B-cell lymphoproliferative disorder:  - elevated lymphocyte count noted in January 2020.  - 3/24/2020: Flow cytometry shows CD5 positive B-cell lymphoproliferative  disorder.  There is a monoclonal B-cell population with kappa light chain     restriction.  The phenotype is not typical for CLL/SLL or mantle cell lymphoma. Cytogenetic studies were normal.  Normal CLL FISH.             HPI  Patient comes in today for follow-up.  He says he was recently diagnosed with diabetes.  Since then he has been trying to lose weight.  He has been cutting carbs for about 2 weeks and certainly lost 13 pounds.    Review of Systems   Constitutional: Positive for fatigue (5/10). Negative for appetite change, diaphoresis, fever, unexpected weight gain and unexpected weight loss.   HENT: Negative for hearing loss, mouth sores, sore throat, swollen glands, trouble swallowing and voice change.    Eyes: Negative for blurred vision.   Respiratory: Negative for cough, shortness of breath and wheezing.    Cardiovascular: Negative for chest pain and palpitations.   Gastrointestinal: Negative for abdominal pain, blood in stool, constipation, diarrhea, nausea and vomiting.   Endocrine: Negative for cold intolerance and heat intolerance.   Genitourinary: Negative for difficulty urinating, dysuria, frequency, hematuria and urinary incontinence.   Musculoskeletal: Positive for arthralgias (Lt knee 5/10). Negative for back pain and myalgias.   Skin: Negative for rash, skin lesions and wound.   Neurological: Negative for dizziness, seizures, weakness, numbness and headache.   Hematological: Does not bruise/bleed easily.   Psychiatric/Behavioral: Negative for depressed  mood. The patient is not nervous/anxious.    All other systems reviewed and are negative.      Past Medical History:   Diagnosis Date   • Elevated WBC count    • GERD (gastroesophageal reflux disease)    • High cholesterol    • Hypertension    • Leukocytosis    • Sleep apnea      Past Surgical History:   Procedure Laterality Date   • CIRCUMCISION N/A 1/13/2023    Procedure: Cystoscopy and Circumcision;  Surgeon: Lashonda Vargas MD;  Location: [facility] OR;  Service: Urology;  Laterality: N/A;   • PROSTATE BIOPSY     • UMBILICAL HERNIA REPAIR       Social History     Socioeconomic History   • Marital status:    • Number of children: 0   Tobacco Use   • Smoking status: Former   • Smokeless tobacco: Never   • Tobacco comments:     10-25 PACK YEARS   Vaping Use   • Vaping Use: Never used   Substance and Sexual Activity   • Alcohol use: Yes     Comment: occasional   • Drug use: Not Currently   • Sexual activity: Defer     Family History   Problem Relation Age of Onset   • Lung cancer Other        Objective   Physical Exam  General: Alert, cooperative, no acute distress  Eyes: Anicteric sclera, PERRLA  Respiratory: normal respiratory effort  Cardiovascular: no lower extremity edema  Skin: Normal tone, no rash, no lesions  Psychiatric: Appropriate affect, intact judgment  Neurologic: No focal sensory or motor deficits, normal cognition   Musculoskeletal: Normal muscle strength and tone  Extremities: No clubbing, cyanosis, or deformities    There were no vitals filed for this visit.  ECOG score: 0         PHQ-9 Total Score:         Result Review :   The following data was reviewed by: Vilma Stevens MA on 04/12/2023:  Lab Results   Component Value Date    HGB 13.9 03/27/2023    HCT 42.8 03/27/2023    MCV 86.8 03/27/2023     03/27/2023    WBC 24.12 (H) 03/27/2023    NEUTROABS 6.75 03/27/2023    LYMPHSABS 12.68 (H) 09/23/2022    MONOSABS 0.96 (H) 09/23/2022    EOSABS 0.21 09/23/2022    BASOSABS 0.07  09/23/2022     Lab Results   Component Value Date    GLUCOSE 100 (H) 03/27/2023    BUN 17 03/27/2023    CREATININE 1.15 03/27/2023     03/27/2023    K 4.1 03/27/2023    CL 99 03/27/2023    CO2 27.8 03/27/2023    CALCIUM 9.3 03/27/2023    PROTEINTOT 6.7 03/27/2023    ALBUMIN 4.3 03/27/2023    BILITOT 0.6 03/27/2023    ALKPHOS 123 (H) 03/27/2023    AST 14 03/27/2023    ALT 17 03/27/2023          Assessment and Plan    There are no diagnoses linked to this encounter.      CD5+ B-cell lymphoproliferative disorder:  The patient has a slight increase in his lymphocyte count but no other abnormalities on CBC.  Recent peripheral smear was consistent with his history of CLL.  He denies any new symptoms. He will f/u with me in 6 months with repeat CBC and LDH.        Patient was given instructions and counseling regarding his condition or for health maintenance advice. Please see specific information pulled into the AVS if appropriate.

## 2023-04-12 NOTE — TELEPHONE ENCOUNTER
Pt has MCR and needs to have his order for Blood Pressure Monitoring Device sent to [facility] so it will be covered by insurance.

## 2023-04-18 ENCOUNTER — TELEPHONE (OUTPATIENT)
Dept: INTERNAL MEDICINE | Facility: CLINIC | Age: 73
End: 2023-04-18
Payer: MEDICARE

## 2023-04-18 DIAGNOSIS — M25.562 CHRONIC PAIN OF LEFT KNEE: Primary | ICD-10-CM

## 2023-04-18 DIAGNOSIS — G89.29 CHRONIC PAIN OF LEFT KNEE: Primary | ICD-10-CM

## 2023-04-18 NOTE — TELEPHONE ENCOUNTER
PATIENT STATED THAT HE IS NEEDING HIS MRI REFERRAL CHANGED TO THE LEFT KNEE AND NOT THE RIGHT KNEE    PATIENT WOULD LIKE A CALL ONCE THE REFERRAL HAS BEEN CHANGED.

## 2023-04-21 ENCOUNTER — TELEPHONE (OUTPATIENT)
Dept: UROLOGY | Facility: CLINIC | Age: 73
End: 2023-04-21
Payer: MEDICARE

## 2023-04-21 NOTE — TELEPHONE ENCOUNTER
UNABLE TO WARM CUEVA    Caller: Quique Valdez    Relationship: Self    Best call back number: [phone]    What is the best time to reach you: ANY    Who are you requesting to speak with (clinical staff, provider,  specific staff member): UNKNOWN    Do you know the name of the person who called: UNKNOWN    What was the call regarding: PT RECVD CALL TO CALLBACK REGARDING APPT SCHEDULED 5/18/23    Do you require a callback: PLEASE CALL PT BACK TO DISCUSS.

## 2023-05-03 ENCOUNTER — OFFICE VISIT (OUTPATIENT)
Dept: INTERNAL MEDICINE | Facility: CLINIC | Age: 73
End: 2023-05-03
Payer: MEDICARE

## 2023-05-03 VITALS
HEART RATE: 65 BPM | TEMPERATURE: 97.6 F | RESPIRATION RATE: 18 BRPM | HEIGHT: 70 IN | WEIGHT: 283.2 LBS | DIASTOLIC BLOOD PRESSURE: 80 MMHG | OXYGEN SATURATION: 95 % | BODY MASS INDEX: 40.54 KG/M2 | SYSTOLIC BLOOD PRESSURE: 138 MMHG

## 2023-05-03 DIAGNOSIS — Z23 NEED FOR SHINGLES VACCINE: ICD-10-CM

## 2023-05-03 DIAGNOSIS — E11.9 TYPE 2 DIABETES MELLITUS WITHOUT COMPLICATION, WITHOUT LONG-TERM CURRENT USE OF INSULIN: Primary | ICD-10-CM

## 2023-05-03 DIAGNOSIS — I10 PRIMARY HYPERTENSION: Chronic | ICD-10-CM

## 2023-05-03 NOTE — PROGRESS NOTES
"Chief Complaint  Hypertension (4 week f/u)    Subjective          Quique Valdez presents to [facility]  History of Present Illness  HTN-improved  Was not able to get a bp kit through insurance  Denies chest pain, headache, dizziness  Reports having cp prior to last visit, but has not been recurrent since changing diet  appt with cardiology in aug    DM-drinking diet soda instead of regular  Cut down on pastas, breads, sweets, potatoes  9lb weight loss since 3/30  Tolerating metformin okay    He has had left knee injection without improvement, MRI scheduled    Had pneumonia vaccine at [facility]  Objective   Vital Signs:   /80 (BP Location: Left arm, Patient Position: Sitting, Cuff Size: Adult)   Pulse 65   Temp 97.6 °F (36.4 °C) (Temporal)   Resp 18   Ht 177.8 cm (70\")   Wt 128 kg (283 lb 3.2 oz)   SpO2 95%   BMI 40.63 kg/m²     Physical Exam  Vitals and nursing note reviewed.   Constitutional:       General: He is not in acute distress.     Appearance: Normal appearance.   HENT:      Head: Normocephalic and atraumatic.      Right Ear: External ear normal.      Left Ear: External ear normal.      Nose: Nose normal.      Mouth/Throat:      Mouth: Mucous membranes are moist.   Eyes:      Conjunctiva/sclera: Conjunctivae normal.   Cardiovascular:      Rate and Rhythm: Normal rate and regular rhythm.      Pulses: Normal pulses.      Heart sounds: Normal heart sounds. No murmur heard.    No friction rub. No gallop.   Pulmonary:      Effort: Pulmonary effort is normal. No respiratory distress.      Breath sounds: No wheezing, rhonchi or rales.   Musculoskeletal:      Cervical back: Neck supple.   Skin:     General: Skin is warm and dry.   Neurological:      General: No focal deficit present.      Mental Status: He is alert and oriented to person, place, and time.   Psychiatric:         Mood and Affect: Mood normal.         Behavior: Behavior " normal.        Result Review :          Procedures      Assessment and Plan    Diagnoses and all orders for this visit:    1. Type 2 diabetes mellitus without complication, without long-term current use of insulin (Primary)  Comments:  Tolerating metformin.  Continue at this time.  He will continue to work on diet modification and weight loss  Orders:  -     CBC & Differential; Future  -     Comprehensive Metabolic Panel; Future  -     Lipid Panel; Future  -     TSH; Future  -     MicroAlbumin, Urine, Random - Urine, Clean Catch; Future  -     Hemoglobin A1c; Future    2. Primary hypertension  Comments:  Significantly improved.  Lifestyle modification will also likely help.  Continue current meds.    3. Need for shingles vaccine    Other orders  -     Zoster Vac Recomb Adjuvanted 50 MCG/0.5ML reconstituted suspension; Inject 0.5 mL into the appropriate muscle as directed by prescriber 1 (One) Time for 1 dose. Repeat vaccine in 2-6 mths  Dispense: 0.5 mL; Refill: 1    He will get a blood pressure monitor to begin checking at home and bring log to follow-up        Follow Up   Return in about 2 months (around 7/3/2023).  Patient was given instructions and counseling regarding his condition or for health maintenance advice. Please see specific information pulled into the AVS if appropriate.

## 2023-05-18 ENCOUNTER — LAB (OUTPATIENT)
Dept: LAB | Facility: HOSPITAL | Age: 73
End: 2023-05-18
Payer: MEDICARE

## 2023-05-18 DIAGNOSIS — R97.20 ELEVATED PROSTATE SPECIFIC ANTIGEN (PSA): ICD-10-CM

## 2023-05-18 LAB — PSA SERPL-MCNC: 5.68 NG/ML (ref 0–4)

## 2023-05-18 PROCEDURE — 84153 ASSAY OF PSA TOTAL: CPT

## 2023-05-18 PROCEDURE — 36415 COLL VENOUS BLD VENIPUNCTURE: CPT

## 2023-05-23 ENCOUNTER — HOSPITAL ENCOUNTER (OUTPATIENT)
Dept: MRI IMAGING | Facility: HOSPITAL | Age: 73
Discharge: HOME | End: 2023-05-23
Admitting: NURSE PRACTITIONER
Payer: MEDICARE

## 2023-05-23 DIAGNOSIS — G89.29 CHRONIC PAIN OF LEFT KNEE: ICD-10-CM

## 2023-05-23 DIAGNOSIS — M25.562 CHRONIC PAIN OF LEFT KNEE: ICD-10-CM

## 2023-05-23 PROCEDURE — 73721 MRI JNT OF LWR EXTRE W/O DYE: CPT

## 2023-05-23 NOTE — PROGRESS NOTES
"    UROLOGY OFFICE follow-up NOTE    Subjective   HPI  Quique Valdez is a 72 y.o. male.  Presents for follow-up elevated PSA, BPH with LUTS, urinary urgency and ED.  On doxazosin and finasteride, oxybutynin and on-demand Viagra.  History of circumcision.  Had PSA prior to this visit.    The patient is doing well. He states that he healed well after circumcision. The patient is doing well from a urinary standpoint with current regimen. He states that Viagra is still working well for him.  Requests medication refill.    _________  Circumcision and cystoscopy, 1/13/2023     Cystoscopic findings with prostatomegaly with, bilateral coapting lateral lobes, median lobe, posterior bladder wall diverticulum; no significant trabeculations; no masses or tumors     PSA trend  5/18/2023: 5.680  11/22/2022: 7.48 (on finasteride)  11/24/21: 8.67  10/2020: 11.28  6/2019: 16.45  11/2017: 8.3  1/2017: 7.5  6/2016: 7.2  11/2015: 7.2  6/15: 7.2  3/2015: 7.8     Prostate biopsy: 6/18/2015-right and left negative for malignancy     MRI prostate 9/10/2019: 80 g prostate; PIRAD II x2 lesions; No suspicious lesions (PIRAD IV or V) for malignancy    Results for orders placed or performed in visit on 05/24/23   Bladder Scan   Result Value Ref Range    Volume 0        Review of systems  A review of systems was performed, and positive findings are noted in the HPI.    Objective     Vital Signs:   Resp 16   Ht 177.8 cm (70\")   Wt 126 kg (277 lb 3.2 oz)   BMI 39.77 kg/m²       Physical exam  No acute distress, well-nourished  Awake alert and oriented  Mood normal; affect normal      Bladder Scan interpretation 05/24/2023    Estimation of residual urine via BVI 3000 [address] Bladder Scan  Performed by: Hannah Maldonado RN  Residual Urine: 0 ml  Indication: Benign prostatic hyperplasia (BPH) with urinary urgency    Erectile dysfunction, unspecified erectile dysfunction type    Urinary urgency    Elevated PSA   Position: Supine  Examination: " Incremental scanning of the suprapubic area using 2.0 MHz transducer using copious amounts of acoustic gel.   Findings: An anechoic area was demonstrated which represented the bladder, with measurement of residual urine as noted. I inspected this myself. In that the residual urine was stable or insignificant, refer to plan for treatment and plan necessary at this time.     Problem List:  Patient Active Problem List   Diagnosis   • BPH (benign prostatic hyperplasia)   • Elevated prostate specific antigen (PSA)   • Esophageal reflux   • Hypertensive disease   • Hyperlipidemia   • Low grade B cell lymphoproliferative disorder   • Leukocytosis   • Sciatic leg pain   • Type 2 diabetes mellitus without complication, without long-term current use of insulin   • Phimosis of penis       Assessment & Plan      Diagnoses and all orders for this visit:    1. Benign prostatic hyperplasia (BPH) with urinary urgency (Primary)  -     Bladder Scan  -     doxazosin (Cardura) 4 MG tablet; Take 1 tablet by mouth Daily for 360 days.  Dispense: 90 tablet; Refill: 3  -     finasteride (PROSCAR) 5 MG tablet; Take 1 tablet by mouth Daily.  Dispense: 90 tablet; Refill: 3    2. Erectile dysfunction, unspecified erectile dysfunction type  -     sildenafil (VIAGRA) 100 MG tablet; Take 1 tablet by mouth Daily As Needed for Erectile Dysfunction.  Dispense: 20 tablet; Refill: 2    3. Urinary urgency  -     oxybutynin XL (DITROPAN-XL) 5 MG 24 hr tablet; Take 2 tablets by mouth Daily.  Dispense: 90 tablet; Refill: 3    4. Elevated PSA  -     PSA DIAGNOSTIC; Future      PSA trend reviewed with patient; recommend continued monitoring via PSA in 1 year.    Continue current medication regimen; refills sent to pharmacy     Adequately emptying bladder without postvoid residual; continue to monitor     follow-up in 1 year, PSA prior  All questions and concerns have been addressed at this time.        Transcribed from ambient dictation for Lashonda Vargas  MD by Marcela Telles.  05/24/23   11:03 EDT    Patient or patient representative verbalized consent to the visit recording.  I have personally performed the services described in this document as transcribed by the above individual, and it is both accurate and complete.

## 2023-05-24 ENCOUNTER — OFFICE VISIT (OUTPATIENT)
Dept: UROLOGY | Facility: CLINIC | Age: 73
End: 2023-05-24
Payer: MEDICARE

## 2023-05-24 VITALS — HEIGHT: 70 IN | RESPIRATION RATE: 16 BRPM | WEIGHT: 277.2 LBS | BODY MASS INDEX: 39.69 KG/M2

## 2023-05-24 DIAGNOSIS — N52.9 ERECTILE DYSFUNCTION, UNSPECIFIED ERECTILE DYSFUNCTION TYPE: ICD-10-CM

## 2023-05-24 DIAGNOSIS — R39.15 BENIGN PROSTATIC HYPERPLASIA (BPH) WITH URINARY URGENCY: Primary | ICD-10-CM

## 2023-05-24 DIAGNOSIS — N40.1 BENIGN PROSTATIC HYPERPLASIA (BPH) WITH URINARY URGENCY: Primary | ICD-10-CM

## 2023-05-24 DIAGNOSIS — R39.15 URINARY URGENCY: ICD-10-CM

## 2023-05-24 DIAGNOSIS — R97.20 ELEVATED PSA: ICD-10-CM

## 2023-05-24 LAB — SPECIMEN VOL 24H UR: 0 L

## 2023-05-24 RX ORDER — FINASTERIDE 5 MG/1
5 TABLET, FILM COATED ORAL DAILY
Qty: 90 TABLET | Refills: 3 | Status: SHIPPED | OUTPATIENT
Start: 2023-05-24

## 2023-05-24 RX ORDER — SILDENAFIL 100 MG/1
100 TABLET, FILM COATED ORAL DAILY PRN
Qty: 20 TABLET | Refills: 2 | Status: SHIPPED | OUTPATIENT
Start: 2023-05-24

## 2023-05-24 RX ORDER — OXYBUTYNIN CHLORIDE 5 MG/1
10 TABLET, EXTENDED RELEASE ORAL DAILY
Qty: 90 TABLET | Refills: 3 | Status: SHIPPED | OUTPATIENT
Start: 2023-05-24

## 2023-05-24 RX ORDER — DOXAZOSIN MESYLATE 4 MG/1
4 TABLET ORAL DAILY
Qty: 90 TABLET | Refills: 3 | Status: SHIPPED | OUTPATIENT
Start: 2023-05-24 | End: 2024-05-18

## 2023-05-25 DIAGNOSIS — S83.242A TEAR OF MEDIAL MENISCUS OF LEFT KNEE, CURRENT, UNSPECIFIED TEAR TYPE, INITIAL ENCOUNTER: Primary | ICD-10-CM

## 2023-06-01 ENCOUNTER — OFFICE VISIT (OUTPATIENT)
Dept: ORTHOPEDIC SURGERY | Facility: CLINIC | Age: 73
End: 2023-06-01

## 2023-06-01 VITALS
BODY MASS INDEX: 39.65 KG/M2 | WEIGHT: 277 LBS | HEART RATE: 65 BPM | SYSTOLIC BLOOD PRESSURE: 135 MMHG | DIASTOLIC BLOOD PRESSURE: 85 MMHG | OXYGEN SATURATION: 93 % | HEIGHT: 70 IN

## 2023-06-01 DIAGNOSIS — S83.242D ACUTE MEDIAL MENISCUS TEAR OF LEFT KNEE, SUBSEQUENT ENCOUNTER: Primary | ICD-10-CM

## 2023-06-01 NOTE — PROGRESS NOTES
"Chief Complaint  Pain and Follow-up of the Left Knee     Subjective      Quique Valdez presents to [facility] for a follow-up of left knee.In January he was cleaning his garage, when trying to get up he was having some difficulty. He noticed popping, swelling and pain in his left knee since. I last saw him in my office on 4/6/23. The patient has tried bracing and an injection with some relief. Patient continues to wear his brace and states this gives him some support but he has to be cautious of certain motions. He is present today with MRI results.     No Known Allergies     Social History     Socioeconomic History   • Marital status:    • Number of children: 0   Tobacco Use   • Smoking status: Former   • Smokeless tobacco: Never   • Tobacco comments:     10-25 PACK YEARS   Vaping Use   • Vaping Use: Never used   Substance and Sexual Activity   • Alcohol use: Yes     Comment: occasional   • Drug use: Not Currently   • Sexual activity: Defer        Review of Systems     Objective   Vital Signs:   /85   Pulse 65   Ht 177.8 cm (70\")   Wt 126 kg (277 lb)   SpO2 93%   BMI 39.75 kg/m²       Physical Exam  Constitutional:       Appearance: Normal appearance. Patient is well-developed and normal weight.   HENT:      Head: Normocephalic.      Right Ear: Hearing and external ear normal.      Left Ear: Hearing and external ear normal.      Nose: Nose normal.   Eyes:      Conjunctiva/sclera: Conjunctivae normal.   Cardiovascular:      Rate and Rhythm: Normal rate.   Pulmonary:      Effort: Pulmonary effort is normal.      Breath sounds: No wheezing or rales.   Abdominal:      Palpations: Abdomen is soft.      Tenderness: There is no abdominal tenderness.   Musculoskeletal:      Cervical back: Normal range of motion.   Skin:     Findings: No rash.   Neurological:      Mental Status: Patient is alert and oriented to person, place, and time.   Psychiatric:         Mood and " Affect: Mood and affect normal.         Judgment: Judgment normal.       Ortho Exam      LEFT KNEE: Near full knee range of motion. Minimal swelling. Calf supple, non-tender, no signs of DVT. Dorsal Pedal Pulse 2+, posterior tibialis pulse 2+. Non-antalgic gait. Sensation grossly intact. Neurovascular intact.  Skin intact. Tenderness along the medial joint line. Positive Apley's. Negative Lachman.      Procedures      Imaging Results (Most Recent)     None           Result Review :         MRI Knee Left Without Contrast    Result Date: 5/23/2023  Narrative: PROCEDURE: MRI KNEE LEFT  WO CONTRAST  COMPARISON: None  INDICATIONS: GENERALIZED LEFT KNEE PAIN SINCE JANUARY, 2023.      TECHNIQUE: A complete multi-planar MRI was performed.   FINDINGS:  No fracture or malalignment is seen.  Marrow signal appears normal.  There is a full-thickness tear of the medial meniscal posterior horn.  The resulting gap measures 0.8 cm.  No tear of the lateral meniscus is identified.  The cruciate ligaments, medial collateral ligament, lateral collateral ligament complex, patellar retinacula and extensor mechanism appear intact.  A small knee joint effusion is noted.  There is grade 3 chondromalacia along the medial facet of the patella.  There is grade 2-3 chondromalacia noted in the medial compartment and grade 1-2 chondromalacia in the lateral compartment.  Mild osteoarthritic spurring is noted.  No loose body is seen.   A 2.1 cm popliteal cyst is noted.       Impression:   1. Full-thickness tear of the medial meniscal posterior horn, as above 2. Mild-to-moderate osteoarthritic changes 3. Small knee joint effusion 4. 2.1 cm popliteal cyst     Ayden Ndiaye M.D.       Electronically Signed and Approved By: Ayden Ndiaye M.D. on 5/23/2023 at 16:55                      Assessment and Plan     Diagnoses and all orders for this visit:    1. Acute medial meniscus tear of left knee, subsequent encounter (Primary)        Discussed treatment  plans and diagnosis with the patient. Patient wishes to continue conservative management at this time. His pain is under control at this time. If pain worsens, patient will consider an arthroscopy.     Home exercises provided for the patient.    Discussed surgery. and Call or return if worsening symptoms.    Follow Up     6-8 weeeks      Patient was given instructions and counseling regarding his condition or for health maintenance advice. Please see specific information pulled into the AVS if appropriate.     Scribed for Segundo Hooker MD by Catie Domingo.  06/01/23   16:44 EDT    I have personally performed the services described in this document as scribed by the above individual and it is both accurate and complete. Segundo Hooker MD 06/02/23

## 2023-08-09 DIAGNOSIS — D47.Z9 LOW GRADE B CELL LYMPHOPROLIFERATIVE DISORDER: Primary | ICD-10-CM

## 2023-08-25 NOTE — PROGRESS NOTES
CARDIOLOGY INITIAL CONSULT       Chief Complaint  Establish Care (NEW PT) and Numbness (ARM AND CHEST )    Reason for consultation  Chest pain, unspecified type    Subjective            Quique Valdez presents to [facility] CARDIOLOGY  History of Present Illness    Mr Quick was referred for cardiac evaluation because of  left  arm and chest pain.  He is having intermittent episodes for the past several months.  Last episode was 4 months back.  They usually happen at night.  It starts as a numbness and heaviness of the left arm, associated with mild discomfort on the left side of the chest.  Symptoms can last for several hours and at times the whole night.  For the last 2 episodes he felt numbness of the left side of the face as well.  He is not very active at baseline due to arthritis.  He denies any exertional chest discomfort.  He does have shortness of breath with activity.  Denies any palpitations, dizziness or syncopal episode.  His previous cardiac work-up was done several years back.      Past History:    Essential hypertension  Mixed hyperlipidemia  Diabetes mellitus    Medical History:  Past Medical History:   Diagnosis Date    Diabetes mellitus     Elevated WBC count     GERD (gastroesophageal reflux disease)     High cholesterol     Hypertension     Leukocytosis     Sleep apnea        Surgical History: has a past surgical history that includes Umbilical hernia repair; Prostate biopsy; and Circumcision, non- (N/A, 2023).     Family History: family history includes Lung cancer in an other family member.     Social History: reports that he has quit smoking. He has never used smokeless tobacco. He reports that he does not currently use alcohol. He reports that he does not currently use drugs.    Allergies: Patient has no known allergies.    Current Outpatient Medications on File Prior to Visit   Medication Sig    amLODIPine-benazepril (LOTREL 5-20) 5-20 MG per capsule  "Take 1 capsule by mouth Daily.    aspirin 81 MG EC tablet Take 1 tablet by mouth Daily.    Blood Pressure Monitoring (Blood Pressure Cuff) misc Use to take blood 1-2 times a day    Blood Pressure Monitoring (Blood Pressure Monitor 3) device 1 each Daily.    diclofenac (VOLTAREN) 75 MG EC tablet Take 1 tablet by mouth 2 (Two) Times a Day.    doxazosin (Cardura) 4 MG tablet Take 1 tablet by mouth Daily for 360 days.    Dulaglutide (Trulicity) 0.75 MG/0.5ML solution pen-injector Inject 0.75 mg under the skin into the appropriate area as directed 1 (One) Time Per Week.    finasteride (PROSCAR) 5 MG tablet Take 1 tablet by mouth Daily.    hydroCHLOROthiazide (HYDRODIURIL) 12.5 MG tablet Take 1 tablet by mouth Daily.    metFORMIN ER (GLUCOPHAGE-XR) 500 MG 24 hr tablet Take 2 tablets by mouth Daily With Breakfast.    omeprazole (priLOSEC) 20 MG capsule Take 1 capsule by mouth Daily.    oxybutynin XL (DITROPAN-XL) 5 MG 24 hr tablet Take 2 tablets by mouth Daily.    sildenafil (VIAGRA) 100 MG tablet Take 1 tablet by mouth Daily As Needed for Erectile Dysfunction.    pravastatin (PRAVACHOL) 40 MG tablet Take 1 tablet by mouth Every Night.         Review of Systems   Constitutional:  Positive for fatigue. Negative for unexpected weight gain and unexpected weight loss.   Eyes:  Negative for double vision.   Respiratory:  Positive for shortness of breath. Negative for cough and wheezing.    Cardiovascular:  Positive for chest pain. Negative for palpitations and leg swelling.   Gastrointestinal:  Negative for abdominal pain, nausea and vomiting.   Endocrine: Negative for cold intolerance, heat intolerance, polydipsia and polyuria.   Musculoskeletal:  Positive for arthralgias. Negative for back pain.   Skin:  Negative for color change.   Neurological:  Negative for dizziness, syncope, weakness and headache.   Hematological:  Does not bruise/bleed easily.      Objective     /89   Pulse 67   Ht 177.8 cm (70\")   Wt 117 kg " (259 lb)   BMI 37.16 kg/mý       Physical Exam  Constitutional:       General: He is awake. He is not in acute distress.     Appearance: Normal appearance. He is obese.   Eyes:      Extraocular Movements: Extraocular movements intact.      Pupils: Pupils are equal, round, and reactive to light.   Neck:      Thyroid: No thyromegaly.      Vascular: No carotid bruit or JVD.   Cardiovascular:      Rate and Rhythm: Normal rate and regular rhythm.      Chest Wall: PMI is not displaced.      Heart sounds: Normal heart sounds, S1 normal and S2 normal. No murmur heard.    No friction rub. No gallop. No S3 or S4 sounds.   Pulmonary:      Effort: Pulmonary effort is normal. No respiratory distress.      Breath sounds: Normal breath sounds. No wheezing, rhonchi or rales.   Abdominal:      General: Bowel sounds are normal.      Palpations: Abdomen is soft.      Tenderness: There is no abdominal tenderness.   Musculoskeletal:      Cervical back: Neck supple.      Right lower leg: No edema.      Left lower leg: No edema.   Skin:     Nails: There is no clubbing.   Neurological:      General: No focal deficit present.      Mental Status: He is alert and oriented to person, place, and time.         Result Review :     The following data was reviewed by: Miguel Chinchilla MD on 08/28/2023:    CMP          9/23/2022    14:04 3/27/2023    07:54 6/28/2023    12:52   CMP   Glucose 96  100  108    BUN 11  17  28    Creatinine 0.99  1.15  1.34    EGFR 82.5  68.5  57.0    Sodium 139  139  141    Potassium 4.2  4.1  4.3    Chloride 102  99  105    Calcium 9.4  9.3  9.8    Total Protein 6.5  6.7  6.7    Albumin 4.40  4.3  4.6    Globulin 2.1  2.4  2.1    Total Bilirubin 0.7  0.6  0.6    Alkaline Phosphatase 124  123  118    AST (SGOT) 22  14  20    ALT (SGPT) 21  17  23    Albumin/Globulin Ratio 2.1  1.8  2.2    BUN/Creatinine Ratio 11.1  14.8  20.9    Anion Gap 9.0  12.2  11.0      CBC          9/23/2022    14:04 3/27/2023    07:54 6/28/2023     12:52   CBC   WBC 18.34  24.12  22.52    RBC 5.36  4.93  4.78    Hemoglobin 15.0  13.9  13.7    Hematocrit 47.0  42.8  41.5    MCV 87.7  86.8  86.8    MCH 28.0  28.2  28.7    MCHC 31.9  32.5  33.0    RDW 13.1  13.2  13.3    Platelets 221  233  195      TSH          9/23/2022    14:04 3/27/2023    07:54 6/28/2023    12:52   TSH   TSH 2.000  2.240  1.610      Lipid Panel          9/23/2022    14:04 3/27/2023    07:54 6/28/2023    12:52   Lipid Panel   Total Cholesterol 185  182  173    Triglycerides 87  112  67    HDL Cholesterol 48  49  48    VLDL Cholesterol 16  20  13    LDL Cholesterol  121  113  112    LDL/HDL Ratio 2.49  2.26  2.33         Data reviewed: Cardiology studies        EKG done on 3/30/2023 showed sinus rhythm, borderline T wave normalities in the inferior leads             Assessment and Plan        Diagnoses and all orders for this visit:    1. Chest pain, unspecified type (Primary)  Assessment & Plan:  Intermittent episodes of left arm numbness and pain, at times associated with chest pain and left facial numbness.  Symptoms are somewhat atypical for angina however he has multiple risk factors for coronary artery disease.  EKG has nonspecific changes.  We will proceed with an echocardiogram to assess the LV systolic and diastolic function and rule out significant valvular abnormalities.  Patient also be scheduled for a SPECT stress test to rule out reversible ischemia.  A SPECT is needed since he is unable to exercise in satisfactory manner due to arthritis.    Orders:  -     Adult Transthoracic Echo Complete W/ Cont if Necessary Per Protocol; Future  -     Stress Test With Myocardial Perfusion One Day; Future    2. Mixed hyperlipidemia  Assessment & Plan:  His LDL has been consistently above 100, not at goal.  Options were discussed with the patient.  We will increase dose of pravastatin to 80 mg nightly.    Orders:  -     pravastatin (PRAVACHOL) 80 MG tablet; Take 1 tablet by mouth Every  Night.  Dispense: 90 tablet; Refill: 2    3. Primary hypertension  Assessment & Plan:  Blood pressure well controlled.  Continue amlodipine/benazepril and hydrochlorothiazide at the current dose.            I spent 21 minutes caring for Quique on this date of service. This time includes time spent by me in the following activities:reviewing tests, obtaining and/or reviewing a separately obtained history, performing a medically appropriate examination and/or evaluation , ordering medications, tests, or procedures, and documenting information in the medical record    Follow Up     Return for Will schedule f/u after reviewing test results.    Patient was given instructions and counseling regarding his condition or for health maintenance advice. Please see specific information pulled into the AVS if appropriate.

## 2023-08-28 ENCOUNTER — OFFICE VISIT (OUTPATIENT)
Dept: CARDIOLOGY | Facility: CLINIC | Age: 73
End: 2023-08-28
Payer: MEDICARE

## 2023-08-28 VITALS
SYSTOLIC BLOOD PRESSURE: 131 MMHG | WEIGHT: 259 LBS | HEIGHT: 70 IN | BODY MASS INDEX: 37.08 KG/M2 | DIASTOLIC BLOOD PRESSURE: 89 MMHG | HEART RATE: 67 BPM

## 2023-08-28 DIAGNOSIS — I10 PRIMARY HYPERTENSION: Chronic | ICD-10-CM

## 2023-08-28 DIAGNOSIS — E78.2 MIXED HYPERLIPIDEMIA: ICD-10-CM

## 2023-08-28 DIAGNOSIS — R07.9 CHEST PAIN, UNSPECIFIED TYPE: Primary | ICD-10-CM

## 2023-08-28 PROCEDURE — 1160F RVW MEDS BY RX/DR IN RCRD: CPT | Performed by: INTERNAL MEDICINE

## 2023-08-28 PROCEDURE — 3075F SYST BP GE 130 - 139MM HG: CPT | Performed by: INTERNAL MEDICINE

## 2023-08-28 PROCEDURE — 99204 OFFICE O/P NEW MOD 45 MIN: CPT | Performed by: INTERNAL MEDICINE

## 2023-08-28 PROCEDURE — 1159F MED LIST DOCD IN RCRD: CPT | Performed by: INTERNAL MEDICINE

## 2023-08-28 PROCEDURE — 3079F DIAST BP 80-89 MM HG: CPT | Performed by: INTERNAL MEDICINE

## 2023-08-28 RX ORDER — PRAVASTATIN SODIUM 80 MG/1
80 TABLET ORAL NIGHTLY
Qty: 90 TABLET | Refills: 2 | Status: SHIPPED | OUTPATIENT
Start: 2023-08-28

## 2023-08-28 NOTE — ASSESSMENT & PLAN NOTE
Blood pressure well controlled.  Continue amlodipine/benazepril and hydrochlorothiazide at the current dose.

## 2023-08-28 NOTE — ASSESSMENT & PLAN NOTE
His LDL has been consistently above 100, not at goal.  Options were discussed with the patient.  We will increase dose of pravastatin to 80 mg nightly.   Stable

## 2023-08-28 NOTE — ASSESSMENT & PLAN NOTE
Intermittent episodes of left arm numbness and pain, at times associated with chest pain and left facial numbness.  Symptoms are somewhat atypical for angina however he has multiple risk factors for coronary artery disease.  EKG has nonspecific changes.  We will proceed with an echocardiogram to assess the LV systolic and diastolic function and rule out significant valvular abnormalities.  Patient also be scheduled for a SPECT stress test to rule out reversible ischemia.  A SPECT is needed since he is unable to exercise in satisfactory manner due to arthritis.

## 2023-08-31 ENCOUNTER — PATIENT ROUNDING (BHMG ONLY) (OUTPATIENT)
Dept: CARDIOLOGY | Facility: CLINIC | Age: 73
End: 2023-08-31
Payer: MEDICARE

## 2023-08-31 ENCOUNTER — OFFICE VISIT (OUTPATIENT)
Dept: ORTHOPEDIC SURGERY | Facility: CLINIC | Age: 73
End: 2023-08-31
Payer: MEDICARE

## 2023-08-31 VITALS
SYSTOLIC BLOOD PRESSURE: 159 MMHG | WEIGHT: 259 LBS | HEART RATE: 53 BPM | DIASTOLIC BLOOD PRESSURE: 85 MMHG | HEIGHT: 70 IN | BODY MASS INDEX: 37.08 KG/M2 | OXYGEN SATURATION: 96 %

## 2023-08-31 DIAGNOSIS — S83.242D ACUTE MEDIAL MENISCUS TEAR OF LEFT KNEE, SUBSEQUENT ENCOUNTER: Primary | ICD-10-CM

## 2023-08-31 RX ORDER — DICLOFENAC SODIUM 75 MG/1
75 TABLET, DELAYED RELEASE ORAL 2 TIMES DAILY
Qty: 60 TABLET | Refills: 1 | Status: SHIPPED | OUTPATIENT
Start: 2023-08-31

## 2023-08-31 NOTE — PROGRESS NOTES
A [employer] message had been sent to the patient for PATIENT ROUNDING with [facility]

## 2023-08-31 NOTE — PROGRESS NOTES
"Chief Complaint  Follow-up and Pain of the Left Knee    Subjective      Quique Valdez presents to [facility] for follow-up of left knee pain, osteoarthritis, known medial meniscus tear.  He has managed this conservatively with intermittent injections in the past.  He previously received a left knee steroid injection in office on 4/6/2023 and most recently a left knee Synvisc injection in office on 7/13/2023.  He presents today independently ambulatory without use of assistive device.  Reports that his knee is \"about the same\".  States that he had increased pain following Synvisc injection for approximately 2 weeks and he did not notice any improvement in his symptoms following this.  He does not wish to proceed with repeat injections in the future.  Does report that he is considering arthroscopy in the future, although would like to delay this until after vacation at the end of October.    Objective   No Known Allergies    Vital Signs:   /85   Pulse 53   Ht 177.8 cm (70\")   Wt 117 kg (259 lb)   SpO2 96%   BMI 37.16 kg/mý       Physical Exam    Constitutional: Awake, alert. Well nourished appearance.    Integumentary: Warm, dry, intact. No obvious rashes.    HENT: Atraumatic, normocephalic.   Respiratory: Non labored respirations .   Cardiovascular: Intact peripheral pulses.    Psychiatric: Normal mood and affect. A&O X3    Ortho Exam  Left knee: Skin is warm, dry, and intact.  Tenderness to palpation of medial joint line.  Positive Corry's.  Mild to moderate edema.  Patella is well tracking and knee is stable to varus and valgus stress.  Full knee extension and flexion to 120 degrees.  Full plantarflexion and dorsiflexion of the ankle.  Sensation intact light touch.  Distal neurovascular intact.  Smooth sit to stand transition.  Patient fully weightbearing with nonantalgic gait.    Imaging Results (Most Recent)       None                      Assessment and Plan "   Problem List Items Addressed This Visit    None  Visit Diagnoses       Acute medial meniscus tear of left knee, subsequent encounter    -  Primary            Follow Up   Return in about 9 weeks (around 11/2/2023).    Tobacco Use: Medium Risk    Smoking Tobacco Use: Former    Smokeless Tobacco Use: Never    Passive Exposure: Not on file     Patient is a former smoker.  Encouraged continued tobacco cessation.  Did not discuss options for smoking cessation.    Patient Instructions   Patient has had no relief with steroid injection or Synvsic.  He is eligible for repeat steroid injection, although declines this.  Did discuss participation in PT or home exercise program.  Patient declines PT.  He will call should he change his mind.  Refill for diclofenac sent to pharmacy.  Do not take diclofenac with any other NSAIDs.  He is considering arthroscopy, however, would like to delay tthis until after vacation in the end of October.     Follow up in 9 weeks. If no improvement, considering arthroscopy. Call with changes or concerns.   Patient was given instructions and counseling regarding his condition or for health maintenance advice. Please see specific information pulled into the AVS if appropriate.

## 2023-09-07 ENCOUNTER — LAB (OUTPATIENT)
Dept: LAB | Facility: HOSPITAL | Age: 73
End: 2023-09-07
Payer: MEDICARE

## 2023-09-07 DIAGNOSIS — E11.9 TYPE 2 DIABETES MELLITUS WITHOUT COMPLICATION, WITHOUT LONG-TERM CURRENT USE OF INSULIN: ICD-10-CM

## 2023-09-07 LAB
ALBUMIN SERPL-MCNC: 4.4 G/DL (ref 3.5–5.2)
ALBUMIN/GLOB SERPL: 1.9 G/DL
ALP SERPL-CCNC: 111 U/L (ref 39–117)
ALT SERPL W P-5'-P-CCNC: 20 U/L (ref 1–41)
ANION GAP SERPL CALCULATED.3IONS-SCNC: 10.3 MMOL/L (ref 5–15)
AST SERPL-CCNC: 19 U/L (ref 1–40)
BILIRUB SERPL-MCNC: 0.8 MG/DL (ref 0–1.2)
BUN SERPL-MCNC: 29 MG/DL (ref 8–23)
BUN/CREAT SERPL: 22 (ref 7–25)
CALCIUM SPEC-SCNC: 9.4 MG/DL (ref 8.6–10.5)
CHLORIDE SERPL-SCNC: 105 MMOL/L (ref 98–107)
CHOLEST SERPL-MCNC: 148 MG/DL (ref 0–200)
CO2 SERPL-SCNC: 24.7 MMOL/L (ref 22–29)
CREAT SERPL-MCNC: 1.32 MG/DL (ref 0.76–1.27)
EGFRCR SERPLBLD CKD-EPI 2021: 58 ML/MIN/1.73
GLOBULIN UR ELPH-MCNC: 2.3 GM/DL
GLUCOSE SERPL-MCNC: 112 MG/DL (ref 65–99)
HBA1C MFR BLD: 5.9 % (ref 4.8–5.6)
HDLC SERPL-MCNC: 42 MG/DL (ref 40–60)
LDLC SERPL CALC-MCNC: 89 MG/DL (ref 0–100)
LDLC/HDLC SERPL: 2.1 {RATIO}
POTASSIUM SERPL-SCNC: 4.4 MMOL/L (ref 3.5–5.2)
PROT SERPL-MCNC: 6.7 G/DL (ref 6–8.5)
SODIUM SERPL-SCNC: 140 MMOL/L (ref 136–145)
TRIGL SERPL-MCNC: 88 MG/DL (ref 0–150)
TSH SERPL DL<=0.05 MIU/L-ACNC: 1.4 UIU/ML (ref 0.27–4.2)
VLDLC SERPL-MCNC: 17 MG/DL (ref 5–40)

## 2023-09-07 PROCEDURE — 80053 COMPREHEN METABOLIC PANEL: CPT

## 2023-09-07 PROCEDURE — 80061 LIPID PANEL: CPT

## 2023-09-07 PROCEDURE — 83036 HEMOGLOBIN GLYCOSYLATED A1C: CPT

## 2023-09-07 PROCEDURE — 84443 ASSAY THYROID STIM HORMONE: CPT

## 2023-09-12 ENCOUNTER — OFFICE VISIT (OUTPATIENT)
Dept: INTERNAL MEDICINE | Facility: CLINIC | Age: 73
End: 2023-09-12
Payer: MEDICARE

## 2023-09-12 VITALS
BODY MASS INDEX: 36.73 KG/M2 | HEIGHT: 70 IN | DIASTOLIC BLOOD PRESSURE: 78 MMHG | TEMPERATURE: 97.8 F | WEIGHT: 256.6 LBS | SYSTOLIC BLOOD PRESSURE: 140 MMHG | HEART RATE: 70 BPM | OXYGEN SATURATION: 96 % | RESPIRATION RATE: 18 BRPM

## 2023-09-12 DIAGNOSIS — I10 PRIMARY HYPERTENSION: Chronic | ICD-10-CM

## 2023-09-12 DIAGNOSIS — E11.9 TYPE 2 DIABETES MELLITUS WITHOUT COMPLICATION, WITHOUT LONG-TERM CURRENT USE OF INSULIN: ICD-10-CM

## 2023-09-12 DIAGNOSIS — L25.9 CONTACT DERMATITIS, UNSPECIFIED CONTACT DERMATITIS TYPE, UNSPECIFIED TRIGGER: ICD-10-CM

## 2023-09-12 DIAGNOSIS — E78.2 MIXED HYPERLIPIDEMIA: Primary | ICD-10-CM

## 2023-09-12 DIAGNOSIS — N18.31 STAGE 3A CHRONIC KIDNEY DISEASE: ICD-10-CM

## 2023-09-12 RX ORDER — ACETAMINOPHEN 500 MG
500 TABLET ORAL EVERY 6 HOURS PRN
Qty: 120 TABLET | Refills: 1 | Status: SHIPPED | OUTPATIENT
Start: 2023-09-12

## 2023-09-12 RX ORDER — DULAGLUTIDE 0.75 MG/.5ML
0.75 INJECTION, SOLUTION SUBCUTANEOUS WEEKLY
Qty: 6 ML | Refills: 0 | Status: SHIPPED | OUTPATIENT
Start: 2023-09-12

## 2023-09-12 RX ORDER — ROSUVASTATIN CALCIUM 20 MG/1
20 TABLET, COATED ORAL DAILY
Qty: 90 TABLET | Refills: 1 | Status: SHIPPED | OUTPATIENT
Start: 2023-09-12

## 2023-09-12 RX ORDER — DICLOFENAC SODIUM 75 MG/1
75 TABLET, DELAYED RELEASE ORAL 2 TIMES DAILY
Qty: 60 TABLET | Refills: 1 | Status: CANCELLED | OUTPATIENT
Start: 2023-09-12

## 2023-09-12 RX ORDER — CETIRIZINE HYDROCHLORIDE 10 MG/1
10 TABLET ORAL DAILY
Qty: 90 TABLET | Refills: 1 | Status: SHIPPED | OUTPATIENT
Start: 2023-09-12

## 2023-09-12 NOTE — PROGRESS NOTES
"Chief Complaint  Obesity (3 month follow up ), Diabetes, Hyperlipidemia, Hypertension, and Rash (Stomach, arms, and legs- 2 weeks itches )    Subjective          Quique Valdez presents to [facility]  History of Present Illness  Obesity-has lost about 25 lbs since the last visit.  HLD-improved, leg cramps routinely at night, worsened cramps in the last month  HTN-improved recently  Denies chest pain since prior to last visit, he has recently had appt with cardiology, has echo and stress test upcoming  DM-improved, well controlled, no lows  Sees podiatry  Eating healthier  He also reports rash on stomach and extremities x2 wk, staying x2 wks, itchy at times  Denies new soaps, body washes    CKD-he has been taking diclofenac bid scheduled    Objective   Vital Signs:   /78 (BP Location: Left arm, Patient Position: Sitting, Cuff Size: Adult)   Pulse 70   Temp 97.8 °F (36.6 °C)   Resp 18   Ht 177.8 cm (70\")   Wt 116 kg (256 lb 9.6 oz)   SpO2 96%   BMI 36.82 kg/m²     Physical Exam  Vitals and nursing note reviewed.   Constitutional:       General: He is not in acute distress.     Appearance: Normal appearance.   HENT:      Head: Normocephalic and atraumatic.      Right Ear: External ear normal.      Left Ear: External ear normal.      Nose: Nose normal.      Mouth/Throat:      Mouth: Mucous membranes are moist.   Eyes:      Conjunctiva/sclera: Conjunctivae normal.   Cardiovascular:      Rate and Rhythm: Normal rate and regular rhythm.      Pulses: Normal pulses.      Heart sounds: Normal heart sounds. No murmur heard.    No friction rub. No gallop.   Pulmonary:      Effort: Pulmonary effort is normal. No respiratory distress.      Breath sounds: No wheezing, rhonchi or rales.   Musculoskeletal:      Cervical back: Neck supple.      Right lower leg: No edema.      Left lower leg: No edema.   Skin:     General: Skin is warm and dry.      Findings: Rash " (papular rash on trunk and extremities) present.   Neurological:      General: No focal deficit present.      Mental Status: He is alert and oriented to person, place, and time.   Psychiatric:         Mood and Affect: Mood normal.         Behavior: Behavior normal.      Result Review :          Procedures      Assessment and Plan    Diagnoses and all orders for this visit:    1. Mixed hyperlipidemia (Primary)  Comments:  stopping pravastatin due to leg cramps, starting crestor 20mg    2. Type 2 diabetes mellitus without complication, without long-term current use of insulin  Comments:  well controlled, adding trulicity to help with weight loss  Orders:  -     CBC & Differential; Future  -     Comprehensive Metabolic Panel; Future  -     Hemoglobin A1c; Future  -     Lipid Panel; Future  -     TSH; Future    3. Primary hypertension  Comments:  will cont to monitor. he will cont to work on weight loss    4. Stage 3a chronic kidney disease  Comments:  stopping nsaids, discussed avoiding nsaids, tylenol prn, topical voltaren for pain. recheck in 3 mths    5. Contact dermatitis, unspecified contact dermatitis type, unspecified trigger    Other orders  -     Dulaglutide (Trulicity) 0.75 MG/0.5ML solution pen-injector; Inject 0.75 mg under the skin into the appropriate area as directed 1 (One) Time Per Week.  Dispense: 6 mL; Refill: 0  -     acetaminophen (TYLENOL) 500 MG tablet; Take 1 tablet by mouth Every 6 (Six) Hours As Needed for Mild Pain.  Dispense: 120 tablet; Refill: 1  -     Diclofenac Sodium (Voltaren) 1 % gel gel; Apply 4 g topically to the appropriate area as directed 4 (Four) Times a Day As Needed (hand pain).  Dispense: 100 g; Refill: 1  -     rosuvastatin (Crestor) 20 MG tablet; Take 1 tablet by mouth Daily.  Dispense: 90 tablet; Refill: 1  -     cetirizine (zyrTEC) 10 MG tablet; Take 1 tablet by mouth Daily.  Dispense: 90 tablet; Refill: 1    Discussed suspicion for contact dermatitis reaction causing  rash.  No identified cause at this time.  Encourage patient to be vigilant in monitoring for any new products or materials given that rash is present only on trunk and extremities were clothing touches.  We will try Zyrtec daily to see if symptoms improved.  Symptoms are mild and he has no new lesions so we will hold off on systemic steroid use at this time given elevated blood pressure history and diabetes.  He will call if this does not resolve in the next 2 weeks or with any worsening.          Follow Up   Return in about 3 months (around 12/12/2023).  Patient was given instructions and counseling regarding his condition or for health maintenance advice. Please see specific information pulled into the AVS if appropriate.

## 2023-09-21 ENCOUNTER — TELEPHONE (OUTPATIENT)
Dept: CARDIOLOGY | Facility: CLINIC | Age: 73
End: 2023-09-21

## 2023-09-21 NOTE — TELEPHONE ENCOUNTER
----- Message from Miguel Chinchilla MD sent at 9/21/2023  9:58 AM EDT -----  Echocardiogram showed normal heart function.  There are no major valvular problems.  It is a normal study.    His stress test is scheduled in November, even though he was seen in the office in late August.  Can we reschedule the stress test to next week ?       Electronically signed by Miguel Chinchilla MD, 09/21/23, 9:58 AM EDT.

## 2023-09-21 NOTE — TELEPHONE ENCOUNTER
Patient left vm regarding elevated BP. RN attempted to return call. Left VM with call back number.

## 2023-09-26 ENCOUNTER — OFFICE VISIT (OUTPATIENT)
Dept: PODIATRY | Facility: CLINIC | Age: 73
End: 2023-09-26
Payer: MEDICARE

## 2023-09-26 VITALS
DIASTOLIC BLOOD PRESSURE: 86 MMHG | OXYGEN SATURATION: 94 % | BODY MASS INDEX: 37.02 KG/M2 | SYSTOLIC BLOOD PRESSURE: 146 MMHG | TEMPERATURE: 98.2 F | WEIGHT: 258 LBS | HEART RATE: 80 BPM

## 2023-09-26 DIAGNOSIS — L60.0 ONYCHOCRYPTOSIS: ICD-10-CM

## 2023-09-26 DIAGNOSIS — G62.9 NEUROPATHY: ICD-10-CM

## 2023-09-26 DIAGNOSIS — M79.672 FOOT PAIN, BILATERAL: Primary | ICD-10-CM

## 2023-09-26 DIAGNOSIS — M79.671 FOOT PAIN, BILATERAL: Primary | ICD-10-CM

## 2023-09-26 DIAGNOSIS — B35.1 ONYCHOMYCOSIS: ICD-10-CM

## 2023-09-26 RX ORDER — DOXAZOSIN 2 MG/1
2 TABLET ORAL
COMMUNITY
Start: 2023-09-13

## 2023-09-26 NOTE — PROGRESS NOTES
[facility] - PODIATRY    Today's Date: 09/26/23    Patient Name: Quique Valdez  MRN: [id number]  CSN: [id number]  PCP: Charmaine Pate APRN, Last PCP Visit: 9/12/2023  Referring Provider: No ref. provider found    SUBJECTIVE     Chief Complaint   Patient presents with    Left Foot - Follow-up, Nail Problem, Diabetes    Right Foot - Follow-up, Nail Problem, Diabetes     HPI: Quique Valdez, a 72 y.o.male, comes to clinic.    New, Established, New Problem:  Established  Location:  Toenails  Duration:   Greater than five years  Onset:  Gradual  Nature:  sore with palpation.  Stable, worsening, improving:  stable  Aggravating factors:  Pain with shoe gear and ambulation.  Previous Treatment:  debridement    Medical changes:  none    Patient denies any fevers, chills, nausea, vomiting, shortness of breath, nor any other constitutional signs nor symptoms.       Past Medical History:   Diagnosis Date    Diabetes mellitus     Elevated WBC count     GERD (gastroesophageal reflux disease)     High cholesterol     Hypertension     Leukocytosis     Sleep apnea      Past Surgical History:   Procedure Laterality Date    CIRCUMCISION N/A 1/13/2023    Procedure: Cystoscopy and Circumcision;  Surgeon: Lashonda Vargas MD;  Location: [facility] OR;  Service: Urology;  Laterality: N/A;    PROSTATE BIOPSY      UMBILICAL HERNIA REPAIR       Family History   Problem Relation Age of Onset    Lung cancer Other      Social History     Socioeconomic History    Marital status:     Number of children: 0   Tobacco Use    Smoking status: Former    Smokeless tobacco: Never    Tobacco comments:     10-25 PACK YEARS   Vaping Use    Vaping Use: Never used   Substance and Sexual Activity    Alcohol use: Not Currently     Comment: occasional    Drug use: Not Currently    Sexual activity: Defer     No Known Allergies  Current Outpatient Medications   Medication Sig Dispense Refill    acetaminophen (TYLENOL) 500 MG  tablet Take 1 tablet by mouth Every 6 (Six) Hours As Needed for Mild Pain. 120 tablet 1    amLODIPine-benazepril (LOTREL 5-20) 5-20 MG per capsule Take 1 capsule by mouth Daily. 90 capsule 1    aspirin 81 MG EC tablet Take 1 tablet by mouth Daily.      Blood Pressure Monitoring (Blood Pressure Cuff) misc Use to take blood 1-2 times a day 1 each 0    cetirizine (zyrTEC) 10 MG tablet Take 1 tablet by mouth Daily. 90 tablet 1    Diclofenac Sodium (Voltaren) 1 % gel gel Apply 4 g topically to the appropriate area as directed 4 (Four) Times a Day As Needed (hand pain). 100 g 1    doxazosin (CARDURA) 2 MG tablet 1 tablet.      doxazosin (Cardura) 4 MG tablet Take 1 tablet by mouth Daily for 360 days. 90 tablet 3    Dulaglutide (Trulicity) 0.75 MG/0.5ML solution pen-injector Inject 0.75 mg under the skin into the appropriate area as directed 1 (One) Time Per Week. 6 mL 0    finasteride (PROSCAR) 5 MG tablet Take 1 tablet by mouth Daily. 90 tablet 3    hydroCHLOROthiazide (HYDRODIURIL) 12.5 MG tablet Take 1 tablet by mouth Daily. 90 tablet 1    metFORMIN ER (GLUCOPHAGE-XR) 500 MG 24 hr tablet Take 2 tablets by mouth Daily With Breakfast. 180 tablet 0    omeprazole (priLOSEC) 20 MG capsule Take 1 capsule by mouth Daily. 90 capsule 1    oxybutynin XL (DITROPAN-XL) 5 MG 24 hr tablet Take 2 tablets by mouth Daily. 90 tablet 3    rosuvastatin (Crestor) 20 MG tablet Take 1 tablet by mouth Daily. 90 tablet 1    sildenafil (VIAGRA) 100 MG tablet Take 1 tablet by mouth Daily As Needed for Erectile Dysfunction. 20 tablet 2     No current facility-administered medications for this visit.     Review of Systems   Constitutional: Negative.    Skin:         Painful toenails   Neurological:  Positive for numbness.   All other systems reviewed and are negative.    OBJECTIVE     Vitals:    09/26/23 0831   BP: 146/86   Pulse: 80   Temp: 98.2 °F (36.8 °C)   SpO2: 94%       Patient seen in no apparent distress.      PHYSICAL EXAM:      Foot/Ankle Exam    GENERAL  Appearance:  obese and elderly (Chronically ill)  Orientation:  AAOx3  Affect:  appropriate  Gait:  unimpaired  Assistance:  independent  Right shoe gear: casual shoe  Left shoe gear: casual shoe    VASCULAR     Right Foot Vascularity   Dorsalis pedis:  2+  Posterior tibial:  2+  Skin temperature:  warm  Edema grading:  None  CFT:  < 3 seconds  Pedal hair growth:  Absent  Varicosities:  moderate varicosities     Left Foot Vascularity   Dorsalis pedis:  2+  Posterior tibial:  2+  Skin temperature:  warm  Edema grading:  None  CFT:  < 3 seconds  Pedal hair growth:  Present  Varicosities:  moderate varicosities     NEUROLOGIC     Right Foot Neurologic   Light touch sensation: diminished  Vibratory sensation: diminished  Hot/Cold sensation: diminished  Protective Sensation using [place] Monofilament:   Sites intact: 4  Sites tested: 10     Left Foot Neurologic   Light touch sensation: diminished  Vibratory sensation: diminished  Hot/Cold sensation:  diminished  Protective Sensation using [place] Monofilament:   Sites intact: 4  Sites tested: 10    MUSCLE STRENGTH     Right Foot Muscle Strength   Foot dorsiflexion:  4  Foot plantar flexion:  4  Foot inversion:  4  Foot eversion:  4     Left Foot Muscle Strength   Foot dorsiflexion:  4  Foot plantar flexion:  4  Foot inversion:  4  Foot eversion:  4    RANGE OF MOTION     Right Foot Range of Motion   Foot and ankle ROM within normal limits       Left Foot Range of Motion   Foot and ankle ROM within normal limits      DERMATOLOGIC      Right Foot Dermatologic   Skin  Right foot skin is intact.   Nails  2.  Positive for elongated, onychomycosis, abnormal thickness, subungual debris and ingrown toenail.  3.  Positive for elongated, onychomycosis, abnormal thickness, subungual debris and ingrown toenail.  4.  Positive for elongated, onychomycosis, abnormal thickness, subungual debris and ingrown toenail.  5.  Positive for  elongated, onychomycosis, abnormal thickness, subungual debris and ingrown toenail.     Left Foot Dermatologic   Skin  Left foot skin is intact.   Nails  2.  Positive for elongated, onychomycosis, abnormal thickness, subungual debris and ingrown toenail.  3.  Positive for elongated, onychomycosis, abnormal thickness, subungual debris and ingrown toenail.  4.  Positive for elongated, onychomycosis, abnormally thick, subungual debris and ingrown toenail.  5.  Positive for elongated, onychomycosis, abnormally thick, subungual debris and ingrown toenail.    ASSESSMENT/PLAN     Diagnoses and all orders for this visit:    1. Foot pain, bilateral (Primary)    2. Neuropathy    3. Onychocryptosis    4. Onychomycosis    Comprehensive lower extremity examination and evaluation was performed.    Discussed findings and treatment plan including risks, benefits, and treatment options with patient in detail. Patient agreed with treatment plan.    Toenails 2, 3, 4, 5 on Right and 2, 3, 4, 5 on Left were debrided with nail nippers then filed with a Dremel nail brinda.  Patient tolerated procedure well without complications.    An After Visit Summary was printed and given to the patient at discharge, including (if requested) any available informative/educational handouts regarding diagnosis, treatment, or medications. All questions were answered to patient/family satisfaction. Should symptoms fail to improve or worsen they agree to call or return to clinic or to go to the Emergency Department. Discussed the importance of following up with any needed screening tests/labs/specialist appointments and any requested follow-up recommended by me today. Importance of maintaining follow-up discussed and patient accepts that missed appointments can delay diagnosis and potentially lead to worsening of conditions.    Return in about 9 weeks (around 11/28/2023) for Toenail Care., or sooner if acute issues arise.    This document has been  electronically signed by Jonathan Javed DPM on September 26, 2023 08:42 EDT

## 2023-11-06 ENCOUNTER — LAB (OUTPATIENT)
Dept: ONCOLOGY | Facility: HOSPITAL | Age: 73
End: 2023-11-06
Payer: MEDICARE

## 2023-12-04 RX ORDER — DULAGLUTIDE 0.75 MG/.5ML
0.75 INJECTION, SOLUTION SUBCUTANEOUS WEEKLY
Qty: 6 ML | Refills: 0 | Status: SHIPPED | OUTPATIENT
Start: 2023-12-04

## 2023-12-04 RX ORDER — METFORMIN HYDROCHLORIDE 500 MG/1
1000 TABLET, EXTENDED RELEASE ORAL
Qty: 180 TABLET | Refills: 0 | Status: SHIPPED | OUTPATIENT
Start: 2023-12-04

## 2023-12-04 NOTE — TELEPHONE ENCOUNTER
Pt came in office requesting rx refill on metformin and trulicity. RX to be sent to [facility]

## 2023-12-08 ENCOUNTER — LAB (OUTPATIENT)
Dept: LAB | Facility: HOSPITAL | Age: 73
End: 2023-12-08
Payer: MEDICARE

## 2023-12-08 DIAGNOSIS — E11.9 TYPE 2 DIABETES MELLITUS WITHOUT COMPLICATION, WITHOUT LONG-TERM CURRENT USE OF INSULIN: ICD-10-CM

## 2023-12-08 LAB
ALBUMIN SERPL-MCNC: 4.6 G/DL (ref 3.5–5.2)
ALBUMIN/GLOB SERPL: 2.4 G/DL
ALP SERPL-CCNC: 139 U/L (ref 39–117)
ALT SERPL W P-5'-P-CCNC: 20 U/L (ref 1–41)
ANION GAP SERPL CALCULATED.3IONS-SCNC: 9 MMOL/L (ref 5–15)
ANISOCYTOSIS BLD QL: ABNORMAL
AST SERPL-CCNC: 20 U/L (ref 1–40)
BASOPHILS # BLD MANUAL: 0.25 10*3/MM3 (ref 0–0.2)
BASOPHILS NFR BLD MANUAL: 1.1 % (ref 0–1.5)
BILIRUB SERPL-MCNC: 0.7 MG/DL (ref 0–1.2)
BUN SERPL-MCNC: 20 MG/DL (ref 8–23)
BUN/CREAT SERPL: 16.3 (ref 7–25)
CALCIUM SPEC-SCNC: 9.8 MG/DL (ref 8.6–10.5)
CHLORIDE SERPL-SCNC: 104 MMOL/L (ref 98–107)
CHOLEST SERPL-MCNC: 151 MG/DL (ref 0–200)
CO2 SERPL-SCNC: 28 MMOL/L (ref 22–29)
CREAT SERPL-MCNC: 1.23 MG/DL (ref 0.76–1.27)
DEPRECATED RDW RBC AUTO: 37.8 FL (ref 37–54)
EGFRCR SERPLBLD CKD-EPI 2021: 62.8 ML/MIN/1.73
EOSINOPHIL # BLD MANUAL: 0.73 10*3/MM3 (ref 0–0.4)
EOSINOPHIL NFR BLD MANUAL: 3.2 % (ref 0.3–6.2)
ERYTHROCYTE [DISTWIDTH] IN BLOOD BY AUTOMATED COUNT: 12.1 % (ref 12.3–15.4)
GLOBULIN UR ELPH-MCNC: 1.9 GM/DL
GLUCOSE SERPL-MCNC: 115 MG/DL (ref 65–99)
HBA1C MFR BLD: 6 % (ref 4.8–5.6)
HCT VFR BLD AUTO: 42.8 % (ref 37.5–51)
HDLC SERPL-MCNC: 52 MG/DL (ref 40–60)
HGB BLD-MCNC: 13.6 G/DL (ref 13–17.7)
LDLC SERPL CALC-MCNC: 83 MG/DL (ref 0–100)
LDLC/HDLC SERPL: 1.58 {RATIO}
LYMPHOCYTES # BLD MANUAL: 12.47 10*3/MM3 (ref 0.7–3.1)
LYMPHOCYTES NFR BLD MANUAL: 6.3 % (ref 5–12)
MCH RBC QN AUTO: 27.5 PG (ref 26.6–33)
MCHC RBC AUTO-ENTMCNC: 31.8 G/DL (ref 31.5–35.7)
MCV RBC AUTO: 86.5 FL (ref 79–97)
MICROCYTES BLD QL: ABNORMAL
MONOCYTES # BLD: 1.44 10*3/MM3 (ref 0.1–0.9)
NEUTROPHILS # BLD AUTO: 7.91 10*3/MM3 (ref 1.7–7)
NEUTROPHILS NFR BLD MANUAL: 34.7 % (ref 42.7–76)
PLAT MORPH BLD: NORMAL
PLATELET # BLD AUTO: 246 10*3/MM3 (ref 140–450)
PMV BLD AUTO: 10.1 FL (ref 6–12)
POIKILOCYTOSIS BLD QL SMEAR: ABNORMAL
POTASSIUM SERPL-SCNC: 4.6 MMOL/L (ref 3.5–5.2)
PROT SERPL-MCNC: 6.5 G/DL (ref 6–8.5)
RBC # BLD AUTO: 4.95 10*6/MM3 (ref 4.14–5.8)
SODIUM SERPL-SCNC: 141 MMOL/L (ref 136–145)
TRIGL SERPL-MCNC: 85 MG/DL (ref 0–150)
TSH SERPL DL<=0.05 MIU/L-ACNC: 1.4 UIU/ML (ref 0.27–4.2)
VARIANT LYMPHS NFR BLD MANUAL: 2.1 % (ref 0–5)
VARIANT LYMPHS NFR BLD MANUAL: 52.6 % (ref 19.6–45.3)
VLDLC SERPL-MCNC: 16 MG/DL (ref 5–40)
WBC MORPH BLD: NORMAL
WBC NRBC COR # BLD AUTO: 22.79 10*3/MM3 (ref 3.4–10.8)

## 2023-12-08 PROCEDURE — 83036 HEMOGLOBIN GLYCOSYLATED A1C: CPT

## 2023-12-08 PROCEDURE — 80061 LIPID PANEL: CPT

## 2023-12-08 PROCEDURE — 80053 COMPREHEN METABOLIC PANEL: CPT

## 2023-12-08 PROCEDURE — 85025 COMPLETE CBC W/AUTO DIFF WBC: CPT

## 2023-12-08 PROCEDURE — 85007 BL SMEAR W/DIFF WBC COUNT: CPT

## 2023-12-08 PROCEDURE — 84443 ASSAY THYROID STIM HORMONE: CPT

## 2023-12-12 ENCOUNTER — OFFICE VISIT (OUTPATIENT)
Dept: INTERNAL MEDICINE | Facility: CLINIC | Age: 73
End: 2023-12-12
Payer: OTHER GOVERNMENT

## 2023-12-12 VITALS
HEART RATE: 77 BPM | RESPIRATION RATE: 18 BRPM | HEIGHT: 70 IN | SYSTOLIC BLOOD PRESSURE: 142 MMHG | WEIGHT: 259.4 LBS | TEMPERATURE: 98.2 F | BODY MASS INDEX: 37.14 KG/M2 | DIASTOLIC BLOOD PRESSURE: 82 MMHG | OXYGEN SATURATION: 98 %

## 2023-12-12 DIAGNOSIS — E78.2 MIXED HYPERLIPIDEMIA: ICD-10-CM

## 2023-12-12 DIAGNOSIS — L81.9 ATYPICAL PIGMENTED SKIN LESION: ICD-10-CM

## 2023-12-12 DIAGNOSIS — Z00.00 ENCOUNTER FOR ANNUAL WELLNESS EXAM IN MEDICARE PATIENT: ICD-10-CM

## 2023-12-12 DIAGNOSIS — E11.9 TYPE 2 DIABETES MELLITUS WITHOUT COMPLICATION, WITHOUT LONG-TERM CURRENT USE OF INSULIN: ICD-10-CM

## 2023-12-12 DIAGNOSIS — Z11.59 NEED FOR HEPATITIS C SCREENING TEST: ICD-10-CM

## 2023-12-12 DIAGNOSIS — L20.9 ATOPIC DERMATITIS, UNSPECIFIED TYPE: ICD-10-CM

## 2023-12-12 DIAGNOSIS — I10 PRIMARY HYPERTENSION: Primary | Chronic | ICD-10-CM

## 2023-12-12 RX ORDER — TRIAMCINOLONE ACETONIDE 5 MG/G
1 CREAM TOPICAL 3 TIMES DAILY
Qty: 45 G | Refills: 0 | Status: SHIPPED | OUTPATIENT
Start: 2023-12-12

## 2023-12-12 RX ORDER — METHYLPREDNISOLONE 4 MG/1
TABLET ORAL
Qty: 21 TABLET | Refills: 0 | Status: SHIPPED | OUTPATIENT
Start: 2023-12-12

## 2023-12-12 NOTE — PROGRESS NOTES
The ABCs of the Annual Wellness Visit  Subsequent Medicare Wellness Visit    Subjective    Quique Valdez is a 73 y.o. male who presents for a Subsequent Medicare Wellness Visit.    The following portions of the patient's history were reviewed and   updated as appropriate: allergies, current medications, past family history, past medical history, past social history, past surgical history, and problem list.    Compared to one year ago, the patient feels his physical   health is the same.    Compared to one year ago, the patient feels his mental   health is the same.    Recent Hospitalizations:  He was admitted to the hospital during the last year. formerly [facility]      Current Medical Providers:  Patient Care Team:  Charmaine Pate APRN as PCP - General (Nurse Practitioner)  Ijeoma Romero MD PhD as Consulting Physician (Hematology and Oncology)  Lashonda Vargas MD as Consulting Physician (Urology)    Outpatient Medications Prior to Visit   Medication Sig Dispense Refill    acetaminophen (TYLENOL) 500 MG tablet Take 1 tablet by mouth Every 6 (Six) Hours As Needed for Mild Pain. 120 tablet 1    amLODIPine-benazepril (LOTREL 5-20) 5-20 MG per capsule Take 1 capsule by mouth Daily. 90 capsule 1    aspirin 81 MG EC tablet Take 1 tablet by mouth Daily.      Blood Pressure Monitoring (Blood Pressure Cuff) misc Use to take blood 1-2 times a day 1 each 0    cetirizine (zyrTEC) 10 MG tablet Take 1 tablet by mouth Daily. 90 tablet 1    Diclofenac Sodium (Voltaren) 1 % gel gel Apply 4 g topically to the appropriate area as directed 4 (Four) Times a Day As Needed (hand pain). 100 g 1    doxazosin (Cardura) 4 MG tablet Take 1 tablet by mouth Daily for 360 days. 90 tablet 3    Dulaglutide (Trulicity) 0.75 MG/0.5ML solution pen-injector Inject 0.75 mg under the skin into the appropriate area as directed 1 (One) Time Per Week. 6 mL 0    finasteride (PROSCAR) 5 MG tablet Take 1 tablet by mouth Daily. 90 tablet 3    hydroCHLOROthiazide  (HYDRODIURIL) 12.5 MG tablet Take 1 tablet by mouth Daily. 90 tablet 1    metFORMIN ER (GLUCOPHAGE-XR) 500 MG 24 hr tablet Take 2 tablets by mouth Daily With Breakfast. 180 tablet 0    omeprazole (priLOSEC) 20 MG capsule Take 1 capsule by mouth Daily. 90 capsule 1    oxybutynin XL (DITROPAN-XL) 5 MG 24 hr tablet Take 2 tablets by mouth Daily. 90 tablet 3    rosuvastatin (Crestor) 20 MG tablet Take 1 tablet by mouth Daily. 90 tablet 1    sildenafil (VIAGRA) 100 MG tablet Take 1 tablet by mouth Daily As Needed for Erectile Dysfunction. 20 tablet 2    doxazosin (CARDURA) 2 MG tablet 1 tablet.       No facility-administered medications prior to visit.       No opioid medication identified on active medication list. I have reviewed chart for other potential  high risk medication/s and harmful drug interactions in the elderly.        Aspirin is on active medication list. Aspirin use is indicated based on review of current medical condition/s. Pros and cons of this therapy have been discussed today. Benefits of this medication outweigh potential harm.  Patient has been encouraged to continue taking this medication.  .      Patient Active Problem List   Diagnosis    BPH (benign prostatic hyperplasia)    Elevated prostate specific antigen (PSA)    Esophageal reflux    Hypertensive disease    Mixed hyperlipidemia    Low grade B cell lymphoproliferative disorder    Leukocytosis    Sciatic leg pain    Type 2 diabetes mellitus without complication, without long-term current use of insulin    Phimosis of penis    Chest pain     Advance Care Planning   Advance Care Planning     Advance Directive is on file.  ACP discussion was held with the patient during this visit. Patient has an advance directive in EMR which is still valid.      Objective    Vitals:    12/12/23 0800   BP: 142/82   BP Location: Left arm   Patient Position: Sitting   Cuff Size: Adult   Pulse: 77   Resp: 18   Temp: 98.2 °F (36.8 °C)   SpO2: 98%   Weight: 118 kg  "(259 lb 6.4 oz)   Height: 177.8 cm (70\")     Estimated body mass index is 37.22 kg/m² as calculated from the following:    Height as of this encounter: 177.8 cm (70\").    Weight as of this encounter: 118 kg (259 lb 6.4 oz).    Class 2 Severe Obesity (BMI >=35 and <=39.9). Obesity-related health conditions include the following: hypertension, diabetes mellitus, and dyslipidemias. Obesity is unchanged. BMI is is above average; BMI management plan is completed. We discussed portion control and increasing exercise.      Does the patient have evidence of cognitive impairment? No    Lab Results   Component Value Date    TRIG 85 2023    HDL 52 2023    LDL 83 2023    VLDL 16 2023    HGBA1C 6.00 (H) 2023        HEALTH RISK ASSESSMENT    Smoking Status:  Social History     Tobacco Use   Smoking Status Former   Smokeless Tobacco Never   Tobacco Comments    10-25 PACK YEARS     Alcohol Consumption:  Social History     Substance and Sexual Activity   Alcohol Use Not Currently    Comment: occasional     Fall Risk Screen:    STEADI Fall Risk Assessment was completed, and patient is at LOW risk for falls.Assessment completed on:2023    Depression Screenin/12/2023     8:08 AM   PHQ-2/PHQ-9 Depression Screening   Little Interest or Pleasure in Doing Things 0-->not at all   Feeling Down, Depressed or Hopeless 0-->not at all   PHQ-9: Brief Depression Severity Measure Score 0       Health Habits and Functional and Cognitive Screenin/12/2023     8:08 AM   Functional & Cognitive Status   Do you have difficulty preparing food and eating? No   Do you have difficulty bathing yourself, getting dressed or grooming yourself? No   Do you have difficulty using the toilet? No   Do you have difficulty moving around from place to place? No   Do you have trouble with steps or getting out of a bed or a chair? No   Current Diet Diabetic Diet   Dental Exam Up to date   Eye Exam Not up to date "   Exercise (times per week) 5 times per week   Current Exercises Include Walking;Yard Work   Do you need help using the phone?  No   Are you deaf or do you have serious difficulty hearing?  No   Do you need help to go to places out of walking distance? No   Do you need help shopping? No   Do you need help preparing meals?  No   Do you need help with housework?  No   Do you need help with laundry? No   Do you need help taking your medications? No   Do you need help managing money? No   Do you ever drive or ride in a car without wearing a seat belt? No   Have you felt unusual stress, anger or loneliness in the last month? No   Who do you live with? Spouse   If you need help, do you have trouble finding someone available to you? No   Have you been bothered in the last four weeks by sexual problems? No   Do you have difficulty concentrating, remembering or making decisions? No       Age-appropriate Screening Schedule:  Refer to the list below for future screening recommendations based on patient's age, sex and/or medical conditions. Orders for these recommended tests are listed in the plan section. The patient has been provided with a written plan.    Health Maintenance   Topic Date Due    Pneumococcal Vaccine 65+ (1 - PCV) Never done    TDAP/TD VACCINES (1 - Tdap) Never done    ZOSTER VACCINE (1 of 2) Never done    HEPATITIS C SCREENING  Never done    DIABETIC EYE EXAM  Never done    AAA SCREEN (ONE-TIME)  Never done    INFLUENZA VACCINE  08/01/2023    COVID-19 Vaccine (3 - 2023-24 season) 09/01/2023    DIABETIC FOOT EXAM  09/30/2023    HEMOGLOBIN A1C  06/08/2024    URINE MICROALBUMIN  06/28/2024    LIPID PANEL  12/08/2024    ANNUAL WELLNESS VISIT  12/12/2024    BMI FOLLOWUP  12/12/2024    COLORECTAL CANCER SCREENING  01/29/2028                  CMS Preventative Services Quick Reference  Risk Factors Identified During Encounter  Immunizations Discussed/Encouraged: Shingrix  The above risks/problems have been discussed  "with the patient.  Pertinent information has been shared with the patient in the After Visit Summary.  An After Visit Summary and PPPS were made available to the patient.    Follow Up:   Next Medicare Wellness visit to be scheduled in 1 year.       Additional E&M Note during same encounter follows:  Patient has multiple medical problems which are significant and separately identifiable that require additional work above and beyond the Medicare Wellness Visit.      Chief Complaint  Diabetes (3 month follow up ), Hyperlipidemia, Hypertension, Rash (Follow up itching all over body ), and Medicare Wellness-subsequent    Subjective        HPI  Quique Valdez is also being seen today for   Contact derm type rash on abd, back  Scratching constantly  Same areas staying flared    HTN-sbp not at goal  Denies chest pain, headache  HLD-no leg cramps  DM-no lows, A1C well controlled       Objective   Vital Signs:  /82 (BP Location: Left arm, Patient Position: Sitting, Cuff Size: Adult)   Pulse 77   Temp 98.2 °F (36.8 °C)   Resp 18   Ht 177.8 cm (70\")   Wt 118 kg (259 lb 6.4 oz)   SpO2 98%   BMI 37.22 kg/m²     Physical Exam  Vitals and nursing note reviewed.   Constitutional:       General: He is not in acute distress.     Appearance: Normal appearance.   HENT:      Head: Normocephalic and atraumatic.      Right Ear: External ear normal.      Left Ear: External ear normal.      Nose: Nose normal.      Mouth/Throat:      Mouth: Mucous membranes are moist.   Eyes:      Conjunctiva/sclera: Conjunctivae normal.   Cardiovascular:      Rate and Rhythm: Normal rate and regular rhythm.      Pulses: Normal pulses.      Heart sounds: Normal heart sounds. No murmur heard.     No friction rub. No gallop.   Pulmonary:      Effort: Pulmonary effort is normal. No respiratory distress.      Breath sounds: No wheezing, rhonchi or rales.   Musculoskeletal:      Cervical back: Neck supple.      Right lower leg: No edema.      Left " lower leg: No edema.   Skin:     General: Skin is warm and dry.      Findings: Rash (maculopapular rash on trunk and lower extremities with dry patches of legs) present.   Neurological:      General: No focal deficit present.      Mental Status: He is alert and oriented to person, place, and time.   Psychiatric:         Mood and Affect: Mood normal.         Behavior: Behavior normal.                         Assessment and Plan   Diagnoses and all orders for this visit:    1. Primary hypertension (Primary)  Comments:  Recommend monitoring at home.  Discussed goal less than 130/80.    2. Mixed hyperlipidemia  Comments:  Well-controlled, continue current meds    3. Type 2 diabetes mellitus without complication, without long-term current use of insulin  Comments:  Controlled, continue current meds  Orders:  -     Cancel: CBC & Differential; Future  -     Cancel: Comprehensive Metabolic Panel; Future  -     Cancel: Hemoglobin A1c; Future  -     Cancel: Lipid Panel; Future  -     Cancel: TSH; Future  -     CBC & Differential; Future  -     Comprehensive Metabolic Panel; Future  -     Hemoglobin A1c; Future  -     Lipid Panel; Future  -     TSH; Future    4. Need for hepatitis C screening test  -     Cancel: Hepatitis C Antibody; Future  -     Hepatitis C Antibody; Future    5. Atopic dermatitis, unspecified type  Comments:  Will try oral steroid Dosepak given diffuse presentation of rash.  Discussed triamcinolone use on specific spots as needed until improved.    6. Encounter for annual wellness exam in Medicare patient    7. Atypical pigmented skin lesion  Comments:  Concern for basal cell on left face, sending to Derm for biopsy  Orders:  -     Ambulatory Referral to Dermatology    Other orders  -     methylPREDNISolone (MEDROL) 4 MG dose pack; Take as directed on package instructions.  Dispense: 21 tablet; Refill: 0  -     triamcinolone (KENALOG) 0.5 % cream; Apply 1 application  topically to the appropriate area as  directed 3 (Three) Times a Day.  Dispense: 45 g; Refill: 0  -     Emollient (Vanicream) lotion; Apply 1 Application topically 2 (Two) Times a Day.  Dispense: 453 mL; Refill: 2  -     Zoster Vac Recomb Adjuvanted 50 MCG/0.5ML reconstituted suspension; Inject 0.5 mL into the appropriate muscle as directed by prescriber 1 (One) Time for 1 dose. Repeat vaccine in 2-6 mths  Dispense: 0.5 mL; Refill: 1    Discussed eczema, keep skin moist by using moisturizing cream 1-2 times daily.  Use unscented soap and Free and clear laundry detergent.  Limit topical steroid use to the area when acutely inflamed only.  Discussed risk of skin thinning and hypopigmentation with chronic or frequent use of topical steroid.  Discussed avoiding using steroid on face and genitals.         Follow Up   Return in about 4 weeks (around 1/9/2024).  Patient was given instructions and counseling regarding his condition or for health maintenance advice. Please see specific information pulled into the AVS if appropriate.

## 2023-12-19 ENCOUNTER — OFFICE VISIT (OUTPATIENT)
Dept: PODIATRY | Facility: CLINIC | Age: 73
End: 2023-12-19
Payer: MEDICARE

## 2023-12-19 VITALS
HEIGHT: 70 IN | WEIGHT: 260 LBS | OXYGEN SATURATION: 95 % | DIASTOLIC BLOOD PRESSURE: 74 MMHG | BODY MASS INDEX: 37.22 KG/M2 | HEART RATE: 82 BPM | TEMPERATURE: 97.3 F | SYSTOLIC BLOOD PRESSURE: 155 MMHG

## 2023-12-19 DIAGNOSIS — M79.671 FOOT PAIN, BILATERAL: Primary | ICD-10-CM

## 2023-12-19 DIAGNOSIS — L60.0 ONYCHOCRYPTOSIS: ICD-10-CM

## 2023-12-19 DIAGNOSIS — G62.9 NEUROPATHY: ICD-10-CM

## 2023-12-19 DIAGNOSIS — M79.672 FOOT PAIN, BILATERAL: Primary | ICD-10-CM

## 2023-12-19 DIAGNOSIS — B35.1 ONYCHOMYCOSIS: ICD-10-CM

## 2023-12-19 RX ORDER — EMOLLIENT BASE
CREAM (GRAM) TOPICAL
COMMUNITY
Start: 2023-12-12

## 2023-12-19 NOTE — PROGRESS NOTES
[facility] - PODIATRY    Today's Date: 12/19/23    Patient Name: Quique Valdez  MRN: [id number]  CSN: [id number]  PCP: Charmaine Pate APRN, Last PCP Visit: 12/12/2023  Referring Provider: No ref. provider found    SUBJECTIVE     Chief Complaint   Patient presents with    Left Foot - Follow-up, Nail Problem    Right Foot - Follow-up, Nail Problem     HPI: Quique Valdez, a 73 y.o.male, comes to clinic.    New, Established, New Problem:  Established  Location:  Toenails  Duration:   Greater than five years  Onset:  Gradual  Nature:  sore with palpation.  Stable, worsening, improving:  stable  Aggravating factors:  Pain with shoe gear and ambulation.  Previous Treatment:  debridement    Medical changes:  no changes    Patient denies any fevers, chills, nausea, vomiting, shortness of breath, nor any other constitutional signs nor symptoms.       Past Medical History:   Diagnosis Date    Diabetes mellitus     Elevated WBC count     GERD (gastroesophageal reflux disease)     High cholesterol     Hypertension     Leukocytosis     Sleep apnea      Past Surgical History:   Procedure Laterality Date    CIRCUMCISION N/A 1/13/2023    Procedure: Cystoscopy and Circumcision;  Surgeon: Lashonda Vargas MD;  Location: [facility] OR;  Service: Urology;  Laterality: N/A;    PROSTATE BIOPSY      UMBILICAL HERNIA REPAIR       Family History   Problem Relation Age of Onset    Lung cancer Other      Social History     Socioeconomic History    Marital status:     Number of children: 0   Tobacco Use    Smoking status: Former    Smokeless tobacco: Never    Tobacco comments:     10-25 PACK YEARS   Vaping Use    Vaping Use: Never used   Substance and Sexual Activity    Alcohol use: Not Currently     Comment: occasional    Drug use: Not Currently    Sexual activity: Defer     No Known Allergies  Current Outpatient Medications   Medication Sig Dispense Refill    acetaminophen (TYLENOL) 500 MG tablet Take 1  tablet by mouth Every 6 (Six) Hours As Needed for Mild Pain. 120 tablet 1    amLODIPine-benazepril (LOTREL 5-20) 5-20 MG per capsule Take 1 capsule by mouth Daily. 90 capsule 1    aspirin 81 MG EC tablet Take 1 tablet by mouth Daily.      Blood Pressure Monitoring (Blood Pressure Cuff) misc Use to take blood 1-2 times a day 1 each 0    cetirizine (zyrTEC) 10 MG tablet Take 1 tablet by mouth Daily. 90 tablet 1    Diclofenac Sodium (Voltaren) 1 % gel gel Apply 4 g topically to the appropriate area as directed 4 (Four) Times a Day As Needed (hand pain). 100 g 1    doxazosin (Cardura) 4 MG tablet Take 1 tablet by mouth Daily for 360 days. 90 tablet 3    Dulaglutide (Trulicity) 0.75 MG/0.5ML solution pen-injector Inject 0.75 mg under the skin into the appropriate area as directed 1 (One) Time Per Week. 6 mL 0    emollient cream       finasteride (PROSCAR) 5 MG tablet Take 1 tablet by mouth Daily. 90 tablet 3    hydroCHLOROthiazide (HYDRODIURIL) 12.5 MG tablet Take 1 tablet by mouth Daily. 90 tablet 1    metFORMIN ER (GLUCOPHAGE-XR) 500 MG 24 hr tablet Take 2 tablets by mouth Daily With Breakfast. 180 tablet 0    methylPREDNISolone (MEDROL) 4 MG dose pack Take as directed on package instructions. 21 tablet 0    omeprazole (priLOSEC) 20 MG capsule Take 1 capsule by mouth Daily. 90 capsule 1    oxybutynin XL (DITROPAN-XL) 5 MG 24 hr tablet Take 2 tablets by mouth Daily. 90 tablet 3    rosuvastatin (Crestor) 20 MG tablet Take 1 tablet by mouth Daily. 90 tablet 1    sildenafil (VIAGRA) 100 MG tablet Take 1 tablet by mouth Daily As Needed for Erectile Dysfunction. 20 tablet 2    triamcinolone (KENALOG) 0.5 % cream Apply 1 application  topically to the appropriate area as directed 3 (Three) Times a Day. 45 g 0     No current facility-administered medications for this visit.     Review of Systems   Constitutional: Negative.    Skin:         Painful toenails   Neurological:  Positive for numbness.   All other systems reviewed and  are negative.      OBJECTIVE     Vitals:    12/19/23 0745   BP: 155/74   Pulse: 82   Temp: 97.3 °F (36.3 °C)   SpO2: 95%       Patient seen in no apparent distress.      PHYSICAL EXAM:     Foot/Ankle Exam    GENERAL  Diabetic foot exam performed    Appearance:  obese and elderly (Chronically ill)  Orientation:  AAOx3  Affect:  appropriate  Gait:  unimpaired  Assistance:  independent  Right shoe gear: casual shoe  Left shoe gear: casual shoe    VASCULAR     Right Foot Vascularity   Dorsalis pedis:  2+  Posterior tibial:  2+  Skin temperature:  warm  Edema grading:  None  CFT:  < 3 seconds  Pedal hair growth:  Absent  Varicosities:  moderate varicosities     Left Foot Vascularity   Dorsalis pedis:  2+  Posterior tibial:  2+  Skin temperature:  warm  Edema grading:  None  CFT:  < 3 seconds  Pedal hair growth:  Present  Varicosities:  moderate varicosities     NEUROLOGIC     Right Foot Neurologic   Light touch sensation: diminished  Vibratory sensation: diminished  Hot/Cold sensation: diminished  Protective Sensation using Hendricks-Jaylan Monofilament:   Sites intact: 4  Sites tested: 10     Left Foot Neurologic   Light touch sensation: diminished  Vibratory sensation: diminished  Hot/Cold sensation:  diminished  Protective Sensation using Hendricks-Jaylan Monofilament:   Sites intact: 4  Sites tested: 10    MUSCLE STRENGTH     Right Foot Muscle Strength   Foot dorsiflexion:  4  Foot plantar flexion:  4  Foot inversion:  4  Foot eversion:  4     Left Foot Muscle Strength   Foot dorsiflexion:  4  Foot plantar flexion:  4  Foot inversion:  4  Foot eversion:  4    RANGE OF MOTION     Right Foot Range of Motion   Foot and ankle ROM within normal limits       Left Foot Range of Motion   Foot and ankle ROM within normal limits      DERMATOLOGIC      Right Foot Dermatologic   Skin  Right foot skin is intact.   Nails  2.  Positive for elongated, onychomycosis, abnormal thickness, subungual debris and ingrown toenail.  3.   Positive for elongated, onychomycosis, abnormal thickness, subungual debris and ingrown toenail.  4.  Positive for elongated, onychomycosis, abnormal thickness, subungual debris and ingrown toenail.  5.  Positive for elongated, onychomycosis, abnormal thickness, subungual debris and ingrown toenail.     Left Foot Dermatologic   Skin  Left foot skin is intact.   Nails  2.  Positive for elongated, onychomycosis, abnormal thickness, subungual debris and ingrown toenail.  3.  Positive for elongated, onychomycosis, abnormal thickness, subungual debris and ingrown toenail.  4.  Positive for elongated, onychomycosis, abnormally thick, subungual debris and ingrown toenail.  5.  Positive for elongated, onychomycosis, abnormally thick, subungual debris and ingrown toenail.    Diabetic Foot Exam Performed and Monofilament Test Performed    ASSESSMENT/PLAN     Diagnoses and all orders for this visit:    1. Foot pain, bilateral (Primary)    2. Neuropathy    3. Onychocryptosis    4. Onychomycosis    Comprehensive lower extremity examination and evaluation was performed.    Discussed findings and treatment plan including risks, benefits, and treatment options with patient in detail. Patient agreed with treatment plan.    Toenails 2, 3, 4, 5 on Right and 2, 3, 4, 5 on Left were debrided with nail nippers then filed with a Dremel nail brinda.  Patient tolerated procedure well without complications.    Diabetic foot exam performed and documented this date, compliant with CQM required standards. Detail of findings as noted in physical exam.  Lower extremity Neurologic exam for diabetic patient performed and documented this date, compliant with PQRS required standards. Detail of findings as noted in physical exam.  Advised patient importance of good routine lower extremity hygiene. Advised patient importance of evaluating for intact skin and pain free nail borders.  Advised patient to use mirror to evaluate plantar/ soles of feet for  better visualization. Advised patient monitor and phone office to be seen if any cracking to skin, open lesions, painful nail borders or if nails become elongated prior to next visit. Advised patient importance of daily cleansing of lower extremities, followed by good skin cream to maintain normal hydration of skin. Also advised patient importance of close daily monitoring of blood sugar. Advised to regulate diet and medications to maintain control of blood sugar in optimal range. Contact primary care provider if difficulties maintaining blood sugar levels.  Advised Patient of presence of Diabetes Mellitus condition.  Advised Patient risk of progression and worsening or improvement, then return of condition.  Will monitor condition for any change in future. Treat with most appropriate treatment pending status of condition.  Counseled and advised patient extensively on nature and ramifications of diabetes. Standard instructions given to patient for good diabetic foot care and maintenance. Advised importance of careful monitoring to avoid break down and complications secondary to diabetes. Advised patient importance of strict maintenance of blood sugar control. Advised patient of possible ominous results from neglect of condition, i.e.: amputation/ loss of digits, feet and legs, or even death.  Patient states understands counseling, will monitor closely, continue good hygiene and routine diabetic foot care. Patient will contact office is questions or problems.      Patient is to monitor for recurrence and any new symptoms and to contact Dr. Javed's office for a follow-up appointment.      The patient states understanding and agreement with this plan.    An After Visit Summary was printed and given to the patient at discharge, including (if requested) any available informative/educational handouts regarding diagnosis, treatment, or medications. All questions were answered to patient/family satisfaction. Should  symptoms fail to improve or worsen they agree to call or return to clinic or to go to the Emergency Department. Discussed the importance of following up with any needed screening tests/labs/specialist appointments and any requested follow-up recommended by me today. Importance of maintaining follow-up discussed and patient accepts that missed appointments can delay diagnosis and potentially lead to worsening of conditions.    Return in about 9 weeks (around 2/20/2024) for Toenail Care., or sooner if acute issues arise.    This document has been electronically signed by Jonathan Javed DPM on December 19, 2023 07:51 EST

## 2023-12-29 ENCOUNTER — HOSPITAL ENCOUNTER (OUTPATIENT)
Dept: NUCLEAR MEDICINE | Facility: HOSPITAL | Age: 73
Discharge: HOME | End: 2023-12-29
Payer: MEDICARE

## 2023-12-29 DIAGNOSIS — R07.9 CHEST PAIN, UNSPECIFIED TYPE: ICD-10-CM

## 2023-12-29 LAB
BH CV IMMEDIATE POST TECH DATA BLOOD PRESSURE: NORMAL MMHG
BH CV IMMEDIATE POST TECH DATA HEART RATE: 136 BPM
BH CV IMMEDIATE POST TECH DATA OXYGEN SATS: 94 %
BH CV REST NUCLEAR ISOTOPE DOSE: 9.7 MCI
BH CV SIX MINUTE RECOVERY TECH DATA BLOOD PRESSURE: NORMAL
BH CV SIX MINUTE RECOVERY TECH DATA HEART RATE: 93 BPM
BH CV SIX MINUTE RECOVERY TECH DATA OXYGEN SATURATION: 96 %
BH CV STRESS BP STAGE 1: NORMAL
BH CV STRESS BP STAGE 2: NORMAL
BH CV STRESS BP STAGE 3: NORMAL
BH CV STRESS DURATION MIN STAGE 1: 3
BH CV STRESS DURATION MIN STAGE 2: 3
BH CV STRESS DURATION MIN STAGE 3: 3
BH CV STRESS DURATION SEC STAGE 1: 0
BH CV STRESS DURATION SEC STAGE 2: 0
BH CV STRESS DURATION SEC STAGE 3: 0
BH CV STRESS GRADE STAGE 1: 0
BH CV STRESS GRADE STAGE 2: 5
BH CV STRESS GRADE STAGE 3: 10
BH CV STRESS HR STAGE 1: 115
BH CV STRESS HR STAGE 2: 128
BH CV STRESS HR STAGE 3: 136
BH CV STRESS METS STAGE 1: 2.3
BH CV STRESS METS STAGE 2: 3.5
BH CV STRESS METS STAGE 3: 4.6
BH CV STRESS NUCLEAR ISOTOPE DOSE: 36.7 MCI
BH CV STRESS O2 STAGE 1: 95
BH CV STRESS PROTOCOL 1: NORMAL
BH CV STRESS RECOVERY BP: NORMAL MMHG
BH CV STRESS RECOVERY HR: 93 BPM
BH CV STRESS RECOVERY O2: 96 %
BH CV STRESS SPEED STAGE 1: 1.7
BH CV STRESS SPEED STAGE 2: 1.7
BH CV STRESS SPEED STAGE 3: 1.7
BH CV STRESS STAGE 1: 1
BH CV STRESS STAGE 2: 2
BH CV STRESS STAGE 3: 3
BH CV THREE MINUTE POST TECH DATA BLOOD PRESSURE: NORMAL MMHG
BH CV THREE MINUTE POST TECH DATA HEART RATE: 98 BPM
BH CV THREE MINUTE POST TECH DATA OXYGEN SATURATION: 96 %
LV EF NUC BP: 49 %
MAXIMAL PREDICTED HEART RATE: 149 BPM
PERCENT MAX PREDICTED HR: 91.28 %
STRESS BASELINE BP: NORMAL MMHG
STRESS BASELINE HR: 77 BPM
STRESS O2 SAT REST: 95 %
STRESS PERCENT HR: 107 %
STRESS POST ESTIMATED WORKLOAD: 4.5 METS
STRESS POST EXERCISE DUR MIN: 7 MIN
STRESS POST EXERCISE DUR SEC: 31 SEC
STRESS POST PEAK BP: NORMAL MMHG
STRESS POST PEAK HR: 136 BPM
STRESS TARGET HR: 127 BPM

## 2023-12-29 PROCEDURE — 93017 CV STRESS TEST TRACING ONLY: CPT

## 2023-12-29 PROCEDURE — A9502 TC99M TETROFOSMIN: HCPCS | Performed by: INTERNAL MEDICINE

## 2023-12-29 PROCEDURE — 0 TECHNETIUM TETROFOSMIN KIT: Performed by: INTERNAL MEDICINE

## 2023-12-29 PROCEDURE — 78452 HT MUSCLE IMAGE SPECT MULT: CPT

## 2023-12-29 RX ADMIN — TETROFOSMIN 1 DOSE: 1.38 INJECTION, POWDER, LYOPHILIZED, FOR SOLUTION INTRAVENOUS at 08:15

## 2023-12-29 RX ADMIN — TETROFOSMIN 1 DOSE: 1.38 INJECTION, POWDER, LYOPHILIZED, FOR SOLUTION INTRAVENOUS at 09:32

## 2024-01-02 ENCOUNTER — TELEPHONE (OUTPATIENT)
Dept: CARDIOLOGY | Facility: CLINIC | Age: 74
End: 2024-01-02
Payer: OTHER GOVERNMENT

## 2024-01-02 NOTE — TELEPHONE ENCOUNTER
----- Message from Aleyda Das RN sent at 1/2/2024 12:33 PM EST -----    ----- Message -----  From: Miguel Chinchilla MD  Sent: 1/2/2024  12:28 PM EST  To: Aleyda Das RN    The stress test showed normal blood supply to all the walls of the heart, indicating no significant blockages.  Heart function is normal as well.    Previous echocardiogram showed normal heart function.    No further cardiac testing is needed at this time.  Recommend a 2-month follow-up visit for reevaluation (6 months from the last office visit).  May be schedule with YEFRI Deng.      Electronically signed by Miguel Chinchilla MD, 01/02/24, 12:28 PM EST.

## 2024-01-30 ENCOUNTER — LAB (OUTPATIENT)
Dept: LAB | Facility: HOSPITAL | Age: 74
End: 2024-01-30
Payer: MEDICARE

## 2024-01-30 DIAGNOSIS — E11.9 TYPE 2 DIABETES MELLITUS WITHOUT COMPLICATION, WITHOUT LONG-TERM CURRENT USE OF INSULIN: ICD-10-CM

## 2024-01-30 LAB
DEPRECATED RDW RBC AUTO: 38.6 FL (ref 37–54)
EOSINOPHIL # BLD MANUAL: 0.23 10*3/MM3 (ref 0–0.4)
EOSINOPHIL NFR BLD MANUAL: 1 % (ref 0.3–6.2)
ERYTHROCYTE [DISTWIDTH] IN BLOOD BY AUTOMATED COUNT: 12.1 % (ref 12.3–15.4)
HCT VFR BLD AUTO: 41.6 % (ref 37.5–51)
HGB BLD-MCNC: 13.1 G/DL (ref 13–17.7)
LYMPHOCYTES # BLD MANUAL: 13.16 10*3/MM3 (ref 0.7–3.1)
LYMPHOCYTES NFR BLD MANUAL: 4 % (ref 5–12)
MCH RBC QN AUTO: 27.3 PG (ref 26.6–33)
MCHC RBC AUTO-ENTMCNC: 31.5 G/DL (ref 31.5–35.7)
MCV RBC AUTO: 86.7 FL (ref 79–97)
MONOCYTES # BLD: 0.91 10*3/MM3 (ref 0.1–0.9)
NEUTROPHILS # BLD AUTO: 8.4 10*3/MM3 (ref 1.7–7)
NEUTROPHILS NFR BLD MANUAL: 37 % (ref 42.7–76)
PLAT MORPH BLD: NORMAL
PLATELET # BLD AUTO: 223 10*3/MM3 (ref 140–450)
PMV BLD AUTO: 10.4 FL (ref 6–12)
RBC # BLD AUTO: 4.8 10*6/MM3 (ref 4.14–5.8)
RBC MORPH BLD: NORMAL
VARIANT LYMPHS NFR BLD MANUAL: 50 % (ref 19.6–45.3)
VARIANT LYMPHS NFR BLD MANUAL: 8 % (ref 0–5)
WBC MORPH BLD: NORMAL
WBC NRBC COR # BLD AUTO: 22.69 10*3/MM3 (ref 3.4–10.8)

## 2024-01-30 PROCEDURE — 85007 BL SMEAR W/DIFF WBC COUNT: CPT

## 2024-01-30 PROCEDURE — 85025 COMPLETE CBC W/AUTO DIFF WBC: CPT

## 2024-01-30 PROCEDURE — 36415 COLL VENOUS BLD VENIPUNCTURE: CPT

## 2024-02-08 ENCOUNTER — OFFICE VISIT (OUTPATIENT)
Dept: INTERNAL MEDICINE | Facility: CLINIC | Age: 74
End: 2024-02-08
Payer: OTHER GOVERNMENT

## 2024-02-08 VITALS
WEIGHT: 263 LBS | RESPIRATION RATE: 18 BRPM | HEIGHT: 70 IN | HEART RATE: 75 BPM | TEMPERATURE: 98.6 F | DIASTOLIC BLOOD PRESSURE: 78 MMHG | BODY MASS INDEX: 37.65 KG/M2 | SYSTOLIC BLOOD PRESSURE: 148 MMHG | OXYGEN SATURATION: 97 %

## 2024-02-08 DIAGNOSIS — L30.9 ECZEMA, UNSPECIFIED TYPE: Primary | ICD-10-CM

## 2024-02-08 DIAGNOSIS — R35.1 BENIGN PROSTATIC HYPERPLASIA WITH NOCTURIA: ICD-10-CM

## 2024-02-08 DIAGNOSIS — N40.1 BENIGN PROSTATIC HYPERPLASIA WITH NOCTURIA: ICD-10-CM

## 2024-02-08 RX ORDER — OXYBUTYNIN CHLORIDE 5 MG/1
10 TABLET, EXTENDED RELEASE ORAL DAILY
Qty: 90 TABLET | Refills: 3 | Status: SHIPPED | OUTPATIENT
Start: 2024-02-08

## 2024-02-08 NOTE — PROGRESS NOTES
"Chief Complaint  Eczema (4 week follow up )    Subjective          Quique Valdez presents to [facility]  History of Present Illness  Eczema-he reports steroid cream helped on patches on legs but other patches have continue/returned  He saw dermatology yesterday  Will be getting cerave cream and switch body wash  Taking zyrtec daily he believes    BPH/Urinary urgency-doing well with oxybutynin  Objective   Vital Signs:   /78 (BP Location: Left arm, Patient Position: Sitting, Cuff Size: Large Adult)   Pulse 75   Temp 98.6 °F (37 °C)   Resp 18   Ht 177.8 cm (70\")   Wt 119 kg (263 lb)   SpO2 97%   BMI 37.74 kg/m²     Physical Exam  Vitals and nursing note reviewed.   Constitutional:       General: He is not in acute distress.     Appearance: Normal appearance.   HENT:      Head: Normocephalic and atraumatic.      Right Ear: External ear normal.      Left Ear: External ear normal.      Nose: Nose normal.      Mouth/Throat:      Mouth: Mucous membranes are moist.   Eyes:      Conjunctiva/sclera: Conjunctivae normal.   Cardiovascular:      Rate and Rhythm: Normal rate and regular rhythm.      Pulses: Normal pulses.      Heart sounds: Normal heart sounds. No murmur heard.     No friction rub. No gallop.   Pulmonary:      Effort: Pulmonary effort is normal. No respiratory distress.      Breath sounds: No wheezing, rhonchi or rales.   Musculoskeletal:      Cervical back: Neck supple.      Right lower leg: No edema.      Left lower leg: No edema.   Skin:     General: Skin is warm and dry.      Findings: Rash (erythematous macular rash on shoulders) present.   Neurological:      General: No focal deficit present.      Mental Status: He is alert and oriented to person, place, and time.   Psychiatric:         Mood and Affect: Mood normal.         Behavior: Behavior normal.        Result Review :          Procedures      Assessment and Plan    Diagnoses and all " orders for this visit:    1. Eczema, unspecified type (Primary)    2. Benign prostatic hyperplasia with nocturia  Comments:  will continue at this time    Other orders  -     oxybutynin XL (DITROPAN-XL) 5 MG 24 hr tablet; Take 2 tablets by mouth Daily.  Dispense: 90 tablet; Refill: 3    Discussed eczema, keep skin moist by using moisturizing cream 1-2 times daily.  Use unscented soap and Free and clear laundry detergent.  Limit topical steroid use to the area when acutely inflamed only.  Discussed risk of skin thinning and hypopigmentation with chronic or frequent use of topical steroid.  Discussed avoiding using steroid on face and genitals. Continue zyrtec. May consider allergy testing if not continuing to improve          Follow Up   Return in about 6 weeks (around 3/21/2024).  Patient was given instructions and counseling regarding his condition or for health maintenance advice. Please see specific information pulled into the AVS if appropriate.

## 2024-03-04 ENCOUNTER — OFFICE VISIT (OUTPATIENT)
Dept: CARDIOLOGY | Facility: CLINIC | Age: 74
End: 2024-03-04
Payer: MEDICARE

## 2024-03-04 VITALS
HEART RATE: 62 BPM | HEIGHT: 70 IN | SYSTOLIC BLOOD PRESSURE: 167 MMHG | BODY MASS INDEX: 37.16 KG/M2 | WEIGHT: 259.6 LBS | DIASTOLIC BLOOD PRESSURE: 76 MMHG

## 2024-03-04 DIAGNOSIS — R07.9 CHEST PAIN, UNSPECIFIED TYPE: Primary | ICD-10-CM

## 2024-03-04 DIAGNOSIS — E78.2 MIXED HYPERLIPIDEMIA: ICD-10-CM

## 2024-03-04 DIAGNOSIS — I10 PRIMARY HYPERTENSION: ICD-10-CM

## 2024-03-04 PROCEDURE — 99213 OFFICE O/P EST LOW 20 MIN: CPT | Performed by: FAMILY MEDICINE

## 2024-03-04 PROCEDURE — 3077F SYST BP >= 140 MM HG: CPT | Performed by: FAMILY MEDICINE

## 2024-03-04 PROCEDURE — 3078F DIAST BP <80 MM HG: CPT | Performed by: FAMILY MEDICINE

## 2024-03-04 NOTE — PROGRESS NOTES
Chief Complaint  Hypertension, Hyperlipidemia, and Follow-up    Subjective        History of Present Illness  Quique Valdez presents to [facility] CARDIOLOGY   Mr. Valdez is a 73-year-old male patient coming in today for cardiac follow-up.  He was previously seen and evaluated due to complaints of chest pain and left arm pain.  He underwent SPECT stress test did not show any evidence of reversible ischemia.  He reports overall he has been doing well.  States he occasionally has a little twinge across his chest wall but no significant episodes of pain.  He is tolerating activity without any symptoms, and denies any shortness of breath, other his activity is slightly limited due to chronic knee pain.   Blood pressure is elevated in the office today at 167/76, however he states he works night shift and has not taken his medication yet this morning.       Past History:     Essential hypertension  Mixed hyperlipidemia  Diabetes mellitus    Past Medical History:   Diagnosis Date    Diabetes mellitus     Elevated WBC count     GERD (gastroesophageal reflux disease)     High cholesterol     Hypertension     Leukocytosis     Sleep apnea        No Known Allergies     Past Surgical History:   Procedure Laterality Date    CIRCUMCISION N/A 1/13/2023    Procedure: Cystoscopy and Circumcision;  Surgeon: Lashonda Vargas MD;  Location: [facility];  Service: Urology;  Laterality: N/A;    PROSTATE BIOPSY      UMBILICAL HERNIA REPAIR          Social History  He  reports that he has quit smoking. He has never used smokeless tobacco. He reports that he does not currently use alcohol. He reports that he does not currently use drugs.    Family History  His family history includes Lung cancer in an other family member.       Current Outpatient Medications on File Prior to Visit   Medication Sig    acetaminophen (TYLENOL) 500 MG tablet Take 1 tablet by mouth Every 6 (Six) Hours As Needed for Mild Pain.     "amLODIPine-benazepril (LOTREL 5-20) 5-20 MG per capsule Take 1 capsule by mouth Daily.    aspirin 81 MG EC tablet Take 1 tablet by mouth Daily.    Blood Pressure Monitoring (Blood Pressure Cuff) misc Use to take blood 1-2 times a day    Diclofenac Sodium (Voltaren) 1 % gel gel Apply 4 g topically to the appropriate area as directed 4 (Four) Times a Day As Needed (hand pain).    doxazosin (Cardura) 4 MG tablet Take 1 tablet by mouth Daily for 360 days.    emollient cream     finasteride (PROSCAR) 5 MG tablet Take 1 tablet by mouth Daily.    fluocinonide (LIDEX) 0.05 % cream Apply 1 Application topically to the appropriate area as directed Every 8 (Eight) Hours.    oxybutynin XL (DITROPAN-XL) 5 MG 24 hr tablet Take 2 tablets by mouth Daily.    sildenafil (VIAGRA) 100 MG tablet Take 1 tablet by mouth Daily As Needed for Erectile Dysfunction.    triamcinolone (KENALOG) 0.5 % cream Apply 1 application  topically to the appropriate area as directed 3 (Three) Times a Day.    methylPREDNISolone (MEDROL) 4 MG dose pack Take as directed on package instructions.     No current facility-administered medications on file prior to visit.         Review of Systems   Constitutional:  Negative for fatigue.   Respiratory:  Negative for cough, chest tightness and shortness of breath.    Cardiovascular:  Negative for chest pain, palpitations and leg swelling.   Gastrointestinal:  Negative for nausea and vomiting.   Musculoskeletal:  Positive for arthralgias.   Neurological:  Negative for dizziness and syncope.        Objective   Vitals:    03/04/24 0931   BP: 167/76   BP Location: Left arm   Pulse: 62   Weight: 118 kg (259 lb 9.6 oz)   Height: 177.8 cm (70\")         Physical Exam  General : Alert, awake, no acute distress  Neck : Supple, no carotid bruit, no jugular venous distention  CVS : Regular rate and rhythm, no murmur, rubs or gallops  Lungs: Clear to auscultation bilaterally, no crackles or rhonchi  Abdomen: Soft, nontender, " "bowel sounds active  Extremities: Warm, well-perfused, no pedal edema      Result Review     The following data was reviewed by YEFRI Herndon  No results found for: \"PROBNP\"  CMP          9/7/2023    16:00 12/8/2023    16:16 3/18/2024    12:28   CMP   Glucose 112  115  108    BUN 29  20  15    Creatinine 1.32  1.23  1.16    EGFR 58.0  62.8  67.3    Sodium 140  141  140    Potassium 4.4  4.6  3.8    Chloride 105  104  101    Calcium 9.4  9.8  9.3    Total Protein 6.7  6.5  6.6    Albumin 4.4  4.6  4.3    Globulin 2.3  1.9  2.3    Total Bilirubin 0.8  0.7  0.9    Alkaline Phosphatase 111  139  135    AST (SGOT) 19  20  14    ALT (SGPT) 20  20  19    Albumin/Globulin Ratio 1.9  2.4  1.9    BUN/Creatinine Ratio 22.0  16.3  12.9    Anion Gap 10.3  9.0  11.6      CBC w/diff          6/28/2023    12:52 12/8/2023    16:16 1/30/2024    12:38   CBC w/Diff   WBC 22.52  22.79  22.69    RBC 4.78  4.95  4.80    Hemoglobin 13.7  13.6  13.1    Hematocrit 41.5  42.8  41.6    MCV 86.8  86.5  86.7    MCH 28.7  27.5  27.3    MCHC 33.0  31.8  31.5    RDW 13.3  12.1  12.1    Platelets 195  246  223       Lab Results   Component Value Date    TSH 1.460 03/18/2024      Lab Results   Component Value Date    FREET4 1.16 09/20/2021        Lipid Panel          9/7/2023    16:00 12/8/2023    16:16 3/18/2024    12:28   Lipid Panel   Total Cholesterol 148  151  140    Triglycerides 88  85  112    HDL Cholesterol 42  52  53    VLDL Cholesterol 17  16  20    LDL Cholesterol  89  83  67    LDL/HDL Ratio 2.10  1.58  1.22        Results for orders placed in visit on 09/21/23    Adult Transthoracic Echo Complete W/ Cont if Necessary Per Protocol    Interpretation Summary    Left ventricular systolic function is normal. Left ventricular ejection fraction appears to be 56 - 60%.    Left ventricular diastolic function is consistent with (grade I) impaired relaxation.    There is mild tricuspid regurgitation.  Estimated right ventricular systolic " pressure from tricuspid regurgitation is normal (<35 mmHg).    Results for orders placed during the hospital encounter of 12/29/23    Stress Test With Myocardial Perfusion One Day    Interpretation Summary    Left ventricular ejection fraction is borderline normal (Calculated EF = 49%).    Patient walked 7 minutes and 31 seconds on modified Warner protocol achieving 4.53 metabolic equivalents.  He did reach target heart rate.  No chest pain was noted.    Stress electrocardiogram showed wandering baseline but no obvious ischemia.  Multiple PVCs were noted.    Myocardial perfusion images shows a fixed small apical defect.  No reversible perfusion defects were noted.    Feel this represents a normal treadmill Cardiolite with no evidence of ischemia.           Assessment and Plan   Diagnoses and all orders for this visit:    1. Chest pain, unspecified type (Primary)  Assessment & Plan:  Previous symptoms of chest pain with associated left arm numbness and pain.  He has not had any further significant symptoms.  Since last seen in the office, he underwent echocardiogram and SPECT stress test without any remarkable findings.  No further cardiac testing is indicated at this time.      2. Primary hypertension  Assessment & Plan:  Blood pressure is elevated in the office, however he states he is not taking his medication yet this morning.  For now continue current doses of amlodipine/benazepril and hydrochlorothiazide, and monitor home blood pressures      3. Mixed hyperlipidemia  Assessment & Plan:  Most recent LDL is 83, still above goal due to underlying diabetes, but improved from previous levels.  For now continue current dose of pravastatin 80 mg nightly.              Follow Up       Return in about 1 year (around 3/4/2025) for with Dr. Chinchilla.    Patient was given instructions and counseling regarding his condition or for health maintenance advice. Please see specific information pulled into the AVS if appropriate.      Signed,  Suly Mcfadden, APRN  03/04/2024     Dictated Utilizing Dragon Dictation: Please note that portions of this note were completed with a voice recognition program.  Part of this note may be an electronic transcription/translation of spoken language to printed text using the Dragon Dictation System.

## 2024-03-12 ENCOUNTER — OFFICE VISIT (OUTPATIENT)
Dept: PODIATRY | Facility: CLINIC | Age: 74
End: 2024-03-12
Payer: MEDICARE

## 2024-03-12 ENCOUNTER — TELEPHONE (OUTPATIENT)
Dept: INTERNAL MEDICINE | Facility: CLINIC | Age: 74
End: 2024-03-12
Payer: OTHER GOVERNMENT

## 2024-03-12 VITALS
HEART RATE: 78 BPM | SYSTOLIC BLOOD PRESSURE: 157 MMHG | OXYGEN SATURATION: 93 % | WEIGHT: 262 LBS | TEMPERATURE: 98.7 F | BODY MASS INDEX: 37.51 KG/M2 | DIASTOLIC BLOOD PRESSURE: 86 MMHG | HEIGHT: 70 IN

## 2024-03-12 DIAGNOSIS — E11.8 DM FEET: ICD-10-CM

## 2024-03-12 DIAGNOSIS — L60.0 ONYCHOCRYPTOSIS: ICD-10-CM

## 2024-03-12 DIAGNOSIS — M79.672 FOOT PAIN, BILATERAL: Primary | ICD-10-CM

## 2024-03-12 DIAGNOSIS — E11.9 DIABETES MELLITUS WITHOUT COMPLICATION: ICD-10-CM

## 2024-03-12 DIAGNOSIS — G62.9 NEUROPATHY: ICD-10-CM

## 2024-03-12 DIAGNOSIS — B35.1 ONYCHOMYCOSIS: ICD-10-CM

## 2024-03-12 DIAGNOSIS — M79.671 FOOT PAIN, BILATERAL: Primary | ICD-10-CM

## 2024-03-12 PROCEDURE — G8404 LOW EXTEMITY NEUR EXAM DOCUM: HCPCS | Performed by: PODIATRIST

## 2024-03-12 PROCEDURE — 1159F MED LIST DOCD IN RCRD: CPT | Performed by: PODIATRIST

## 2024-03-12 PROCEDURE — 1160F RVW MEDS BY RX/DR IN RCRD: CPT | Performed by: PODIATRIST

## 2024-03-12 PROCEDURE — 11721 DEBRIDE NAIL 6 OR MORE: CPT | Performed by: PODIATRIST

## 2024-03-12 PROCEDURE — 3077F SYST BP >= 140 MM HG: CPT | Performed by: PODIATRIST

## 2024-03-12 PROCEDURE — 3079F DIAST BP 80-89 MM HG: CPT | Performed by: PODIATRIST

## 2024-03-12 PROCEDURE — 99213 OFFICE O/P EST LOW 20 MIN: CPT | Performed by: PODIATRIST

## 2024-03-12 RX ORDER — OMEPRAZOLE 20 MG/1
20 CAPSULE, DELAYED RELEASE ORAL DAILY
Qty: 90 CAPSULE | Refills: 1 | Status: SHIPPED | OUTPATIENT
Start: 2024-03-12

## 2024-03-12 RX ORDER — METFORMIN HYDROCHLORIDE 500 MG/1
1000 TABLET, EXTENDED RELEASE ORAL
Qty: 180 TABLET | Refills: 0 | Status: SHIPPED | OUTPATIENT
Start: 2024-03-12

## 2024-03-12 RX ORDER — ROSUVASTATIN CALCIUM 20 MG/1
20 TABLET, COATED ORAL DAILY
Qty: 90 TABLET | Refills: 1 | Status: SHIPPED | OUTPATIENT
Start: 2024-03-12

## 2024-03-12 RX ORDER — HYDROCHLOROTHIAZIDE 12.5 MG/1
12.5 TABLET ORAL DAILY
Qty: 90 TABLET | Refills: 1 | Status: SHIPPED | OUTPATIENT
Start: 2024-03-12

## 2024-03-12 RX ORDER — DULAGLUTIDE 0.75 MG/.5ML
0.75 INJECTION, SOLUTION SUBCUTANEOUS WEEKLY
Qty: 6 ML | Refills: 0 | Status: SHIPPED | OUTPATIENT
Start: 2024-03-12

## 2024-03-12 RX ORDER — CETIRIZINE HYDROCHLORIDE 10 MG/1
10 TABLET ORAL DAILY
Qty: 90 TABLET | Refills: 1 | Status: SHIPPED | OUTPATIENT
Start: 2024-03-12

## 2024-03-12 NOTE — TELEPHONE ENCOUNTER
Patient needs the following medications refilled.     cetirizine (zyrTEC) 10 MG tablet [7260] (Order [id number])     hydroCHLOROthiazide (HYDRODIURIL) 12.5 MG tablet [31026] (Order [id number])     metFORMIN ER (GLUCOPHAGE-XR) 500 MG 24 hr tablet [17334] (Order [id number])     omeprazole (priLOSEC) 20 MG capsule [[id number]] (Order [id number])     rosuvastatin (Crestor) 20 MG tablet [21209] (Order [id number])     Dulaglutide (Trulicity) 0.75 MG/0.5ML solution pen-injector [333950] (Order [id number])     Patient is requesting a call when the meds are sent to the pharmacy

## 2024-03-12 NOTE — PROGRESS NOTES
[facility] - PODIATRY    Today's Date: 03/12/24    Patient Name: Quique Valdez  MRN: [id number]  CSN: [id number]  PCP: Charmaine Pate APRN, Last PCP Visit: 8 February 2024  Referring Provider: No ref. provider found    SUBJECTIVE     Chief Complaint   Patient presents with    Left Foot - Follow-up, Nail Problem    Right Foot - Follow-up, Nail Problem     HPI: Quique Valdez, a 73 y.o.male, comes to clinic.    New, Established, New Problem:  Established  Location:  Toenails  Duration:   Greater than five years  Onset:  Gradual  Nature:  sore with palpation.  Stable, worsening, improving:  stable  Aggravating factors:  Pain with shoe gear and ambulation.  Previous Treatment:  debridement    Medical changes: None    Patient denies any fevers, chills, nausea, vomiting, shortness of breath, nor any other constitutional signs nor symptoms.       Past Medical History:   Diagnosis Date    Diabetes mellitus     Elevated WBC count     GERD (gastroesophageal reflux disease)     High cholesterol     Hypertension     Leukocytosis     Sleep apnea      Past Surgical History:   Procedure Laterality Date    CIRCUMCISION N/A 1/13/2023    Procedure: Cystoscopy and Circumcision;  Surgeon: Lashonda Vargas MD;  Location: [facility] OR;  Service: Urology;  Laterality: N/A;    PROSTATE BIOPSY      UMBILICAL HERNIA REPAIR       Family History   Problem Relation Age of Onset    Lung cancer Other      Social History     Socioeconomic History    Marital status:     Number of children: 0   Tobacco Use    Smoking status: Former    Smokeless tobacco: Never    Tobacco comments:     10-25 PACK YEARS   Vaping Use    Vaping status: Never Used   Substance and Sexual Activity    Alcohol use: Not Currently     Comment: occasional    Drug use: Not Currently    Sexual activity: Defer     No Known Allergies  Current Outpatient Medications   Medication Sig Dispense Refill    acetaminophen (TYLENOL) 500 MG tablet Take 1  tablet by mouth Every 6 (Six) Hours As Needed for Mild Pain. 120 tablet 1    amLODIPine-benazepril (LOTREL 5-20) 5-20 MG per capsule Take 1 capsule by mouth Daily. 90 capsule 1    aspirin 81 MG EC tablet Take 1 tablet by mouth Daily.      Blood Pressure Monitoring (Blood Pressure Cuff) misc Use to take blood 1-2 times a day 1 each 0    cetirizine (zyrTEC) 10 MG tablet Take 1 tablet by mouth Daily. 90 tablet 1    Diclofenac Sodium (Voltaren) 1 % gel gel Apply 4 g topically to the appropriate area as directed 4 (Four) Times a Day As Needed (hand pain). 100 g 1    doxazosin (Cardura) 4 MG tablet Take 1 tablet by mouth Daily for 360 days. 90 tablet 3    Dulaglutide (Trulicity) 0.75 MG/0.5ML solution pen-injector Inject 0.75 mg under the skin into the appropriate area as directed 1 (One) Time Per Week. 6 mL 0    emollient cream       finasteride (PROSCAR) 5 MG tablet Take 1 tablet by mouth Daily. 90 tablet 3    fluocinonide (LIDEX) 0.05 % cream Apply 1 Application topically to the appropriate area as directed Every 8 (Eight) Hours.      hydroCHLOROthiazide (HYDRODIURIL) 12.5 MG tablet Take 1 tablet by mouth Daily. 90 tablet 1    metFORMIN ER (GLUCOPHAGE-XR) 500 MG 24 hr tablet Take 2 tablets by mouth Daily With Breakfast. 180 tablet 0    methylPREDNISolone (MEDROL) 4 MG dose pack Take as directed on package instructions. 21 tablet 0    omeprazole (priLOSEC) 20 MG capsule Take 1 capsule by mouth Daily. 90 capsule 1    oxybutynin XL (DITROPAN-XL) 5 MG 24 hr tablet Take 2 tablets by mouth Daily. 90 tablet 3    rosuvastatin (Crestor) 20 MG tablet Take 1 tablet by mouth Daily. 90 tablet 1    sildenafil (VIAGRA) 100 MG tablet Take 1 tablet by mouth Daily As Needed for Erectile Dysfunction. 20 tablet 2    triamcinolone (KENALOG) 0.5 % cream Apply 1 application  topically to the appropriate area as directed 3 (Three) Times a Day. 45 g 0     No current facility-administered medications for this visit.     Review of Systems    Constitutional: Negative.    Skin:         Painful toenails   Neurological:  Positive for numbness.   All other systems reviewed and are negative.      OBJECTIVE     Vitals:    03/12/24 0826   BP: 157/86   Pulse: 78   Temp: 98.7 °F (37.1 °C)   SpO2: 93%       Patient seen in no apparent distress.      PHYSICAL EXAM:     Foot/Ankle Exam    GENERAL  Appearance:  obese and elderly (Chronically ill)  Orientation:  AAOx3  Affect:  appropriate  Gait:  unimpaired  Assistance:  independent  Right shoe gear: casual shoe  Left shoe gear: casual shoe    VASCULAR     Right Foot Vascularity   Dorsalis pedis:  2+  Posterior tibial:  2+  Skin temperature:  warm  Edema grading:  None  CFT:  < 3 seconds  Pedal hair growth:  Absent  Varicosities:  moderate varicosities     Left Foot Vascularity   Dorsalis pedis:  2+  Posterior tibial:  2+  Skin temperature:  warm  Edema grading:  None  CFT:  < 3 seconds  Pedal hair growth:  Present  Varicosities:  moderate varicosities     NEUROLOGIC     Right Foot Neurologic   Light touch sensation: diminished  Vibratory sensation: diminished  Hot/Cold sensation: diminished  Protective Sensation using [place] Monofilament:   Sites intact: 4  Sites tested: 10     Left Foot Neurologic   Light touch sensation: diminished  Vibratory sensation: diminished  Hot/Cold sensation:  diminished  Protective Sensation using [place] Monofilament:   Sites intact: 4  Sites tested: 10    MUSCLE STRENGTH     Right Foot Muscle Strength   Foot dorsiflexion:  4  Foot plantar flexion:  4  Foot inversion:  4  Foot eversion:  4     Left Foot Muscle Strength   Foot dorsiflexion:  4  Foot plantar flexion:  4  Foot inversion:  4  Foot eversion:  4    RANGE OF MOTION     Right Foot Range of Motion   Foot and ankle ROM within normal limits       Left Foot Range of Motion   Foot and ankle ROM within normal limits      DERMATOLOGIC      Right Foot Dermatologic   Skin  Right foot skin is intact.   Nails  2.   Positive for elongated, onychomycosis, abnormal thickness, subungual debris and ingrown toenail.  3.  Positive for elongated, onychomycosis, abnormal thickness, subungual debris and ingrown toenail.  4.  Positive for elongated, onychomycosis, abnormal thickness, subungual debris and ingrown toenail.  5.  Positive for elongated, onychomycosis, abnormal thickness, subungual debris and ingrown toenail.     Left Foot Dermatologic   Skin  Left foot skin is intact.   Nails  2.  Positive for elongated, onychomycosis, abnormal thickness, subungual debris and ingrown toenail.  3.  Positive for elongated, onychomycosis, abnormal thickness, subungual debris and ingrown toenail.  4.  Positive for elongated, onychomycosis, abnormally thick, subungual debris and ingrown toenail.  5.  Positive for elongated, onychomycosis, abnormally thick, subungual debris and ingrown toenail.    Diabetic Foot Exam Performed and Monofilament Test Performed    I have reexamined the patient the results are consistent with the previously documented exam.    ASSESSMENT/PLAN     Diagnoses and all orders for this visit:    1. Foot pain, bilateral (Primary)    2. Onychocryptosis    3. Onychomycosis    4. Neuropathy    5. Diabetes mellitus without complication    6. DM feet    Comprehensive lower extremity examination and evaluation was performed.    Discussed findings and treatment plan including risks, benefits, and treatment options with patient in detail. Patient agreed with treatment plan.    Toenails 2, 3, 4, 5 on Right and 2, 3, 4, 5 on Left were debrided with nail nippers then filed with a Dremel nail brinda.  Patient tolerated procedure well without complications.    Diabetic foot exam performed and documented this date, compliant with CQM required standards. Detail of findings as noted in physical exam.  Lower extremity Neurologic exam for diabetic patient performed and documented this date, compliant with PQRS required standards. Detail of  findings as noted in physical exam.  Advised patient importance of good routine lower extremity hygiene. Advised patient importance of evaluating for intact skin and pain free nail borders.  Advised patient to use mirror to evaluate plantar/ soles of feet for better visualization. Advised patient monitor and phone office to be seen if any cracking to skin, open lesions, painful nail borders or if nails become elongated prior to next visit. Advised patient importance of daily cleansing of lower extremities, followed by good skin cream to maintain normal hydration of skin. Also advised patient importance of close daily monitoring of blood sugar. Advised to regulate diet and medications to maintain control of blood sugar in optimal range. Contact primary care provider if difficulties maintaining blood sugar levels.  Advised Patient of presence of Diabetes Mellitus condition.  Advised Patient risk of progression and worsening or improvement, then return of condition.  Will monitor condition for any change in future. Treat with most appropriate treatment pending status of condition.  Counseled and advised patient extensively on nature and ramifications of diabetes. Standard instructions given to patient for good diabetic foot care and maintenance. Advised importance of careful monitoring to avoid break down and complications secondary to diabetes. Advised patient importance of strict maintenance of blood sugar control. Advised patient of possible ominous results from neglect of condition, i.e.: amputation/ loss of digits, feet and legs, or even death.  Patient states understands counseling, will monitor closely, continue good hygiene and routine diabetic foot care. Patient will contact office is questions or problems.      Patient is to monitor for recurrence and any new symptoms and to contact Dr. Javed's office for a follow-up appointment.      The patient states understanding and agreement with this plan.    An  After Visit Summary was printed and given to the patient at discharge, including (if requested) any available informative/educational handouts regarding diagnosis, treatment, or medications. All questions were answered to patient/family satisfaction. Should symptoms fail to improve or worsen they agree to call or return to clinic or to go to the Emergency Department. Discussed the importance of following up with any needed screening tests/labs/specialist appointments and any requested follow-up recommended by me today. Importance of maintaining follow-up discussed and patient accepts that missed appointments can delay diagnosis and potentially lead to worsening of conditions.    Return in about 9 weeks (around 5/14/2024) for Toenail Care., or sooner if acute issues arise.    This document has been electronically signed by Jonathan Javed DPM on March 12, 2024 08:34 EDT

## 2024-03-12 NOTE — TELEPHONE ENCOUNTER
Name: Quique Valdez    Relationship: Self    Best Callback Number: [phone]     HUB PROVIDED THE RELAY MESSAGE FROM THE OFFICE   PATIENT VOICED UNDERSTANDING AND HAS NO FURTHER QUESTIONS AT THIS TIME

## 2024-03-18 ENCOUNTER — LAB (OUTPATIENT)
Dept: LAB | Facility: HOSPITAL | Age: 74
End: 2024-03-18
Payer: MEDICARE

## 2024-03-18 DIAGNOSIS — Z11.59 NEED FOR HEPATITIS C SCREENING TEST: ICD-10-CM

## 2024-03-18 DIAGNOSIS — E11.9 TYPE 2 DIABETES MELLITUS WITHOUT COMPLICATION, WITHOUT LONG-TERM CURRENT USE OF INSULIN: ICD-10-CM

## 2024-03-18 LAB
ALBUMIN SERPL-MCNC: 4.3 G/DL (ref 3.5–5.2)
ALBUMIN/GLOB SERPL: 1.9 G/DL
ALP SERPL-CCNC: 135 U/L (ref 39–117)
ALT SERPL W P-5'-P-CCNC: 19 U/L (ref 1–41)
ANION GAP SERPL CALCULATED.3IONS-SCNC: 11.6 MMOL/L (ref 5–15)
AST SERPL-CCNC: 14 U/L (ref 1–40)
BILIRUB SERPL-MCNC: 0.9 MG/DL (ref 0–1.2)
BUN SERPL-MCNC: 15 MG/DL (ref 8–23)
BUN/CREAT SERPL: 12.9 (ref 7–25)
CALCIUM SPEC-SCNC: 9.3 MG/DL (ref 8.6–10.5)
CHLORIDE SERPL-SCNC: 101 MMOL/L (ref 98–107)
CHOLEST SERPL-MCNC: 140 MG/DL (ref 0–200)
CO2 SERPL-SCNC: 27.4 MMOL/L (ref 22–29)
CREAT SERPL-MCNC: 1.16 MG/DL (ref 0.76–1.27)
EGFRCR SERPLBLD CKD-EPI 2021: 67.3 ML/MIN/1.73
GLOBULIN UR ELPH-MCNC: 2.3 GM/DL
GLUCOSE SERPL-MCNC: 108 MG/DL (ref 65–99)
HBA1C MFR BLD: 6.3 % (ref 4.8–5.6)
HCV AB SER DONR QL: NORMAL
HDLC SERPL-MCNC: 53 MG/DL (ref 40–60)
LDLC SERPL CALC-MCNC: 67 MG/DL (ref 0–100)
LDLC/HDLC SERPL: 1.22 {RATIO}
POTASSIUM SERPL-SCNC: 3.8 MMOL/L (ref 3.5–5.2)
PROT SERPL-MCNC: 6.6 G/DL (ref 6–8.5)
SODIUM SERPL-SCNC: 140 MMOL/L (ref 136–145)
TRIGL SERPL-MCNC: 112 MG/DL (ref 0–150)
TSH SERPL DL<=0.05 MIU/L-ACNC: 1.46 UIU/ML (ref 0.27–4.2)
VLDLC SERPL-MCNC: 20 MG/DL (ref 5–40)

## 2024-03-18 PROCEDURE — 83036 HEMOGLOBIN GLYCOSYLATED A1C: CPT

## 2024-03-18 PROCEDURE — 80053 COMPREHEN METABOLIC PANEL: CPT

## 2024-03-18 PROCEDURE — 80061 LIPID PANEL: CPT

## 2024-03-18 PROCEDURE — 84443 ASSAY THYROID STIM HORMONE: CPT

## 2024-03-18 PROCEDURE — 86803 HEPATITIS C AB TEST: CPT

## 2024-03-18 PROCEDURE — 36415 COLL VENOUS BLD VENIPUNCTURE: CPT

## 2024-03-21 NOTE — ASSESSMENT & PLAN NOTE
Previous symptoms of chest pain with associated left arm numbness and pain.  He has not had any further significant symptoms.  Since last seen in the office, he underwent echocardiogram and SPECT stress test without any remarkable findings.  No further cardiac testing is indicated at this time.

## 2024-03-21 NOTE — ASSESSMENT & PLAN NOTE
Most recent LDL is 83, still above goal due to underlying diabetes, but improved from previous levels.  For now continue current dose of pravastatin 80 mg nightly.

## 2024-03-21 NOTE — ASSESSMENT & PLAN NOTE
Blood pressure is elevated in the office, however he states he is not taking his medication yet this morning.  For now continue current doses of amlodipine/benazepril and hydrochlorothiazide, and monitor home blood pressures

## 2024-03-25 ENCOUNTER — OFFICE VISIT (OUTPATIENT)
Dept: INTERNAL MEDICINE | Facility: CLINIC | Age: 74
End: 2024-03-25
Payer: MEDICARE

## 2024-03-25 VITALS
BODY MASS INDEX: 38.51 KG/M2 | RESPIRATION RATE: 18 BRPM | TEMPERATURE: 98.4 F | OXYGEN SATURATION: 96 % | HEIGHT: 70 IN | WEIGHT: 269 LBS | DIASTOLIC BLOOD PRESSURE: 80 MMHG | HEART RATE: 77 BPM | SYSTOLIC BLOOD PRESSURE: 132 MMHG

## 2024-03-25 DIAGNOSIS — L30.9 ECZEMA, UNSPECIFIED TYPE: ICD-10-CM

## 2024-03-25 DIAGNOSIS — E66.01 CLASS 2 SEVERE OBESITY DUE TO EXCESS CALORIES WITH SERIOUS COMORBIDITY AND BODY MASS INDEX (BMI) OF 38.0 TO 38.9 IN ADULT: ICD-10-CM

## 2024-03-25 DIAGNOSIS — E11.9 TYPE 2 DIABETES MELLITUS WITHOUT COMPLICATION, WITHOUT LONG-TERM CURRENT USE OF INSULIN: ICD-10-CM

## 2024-03-25 DIAGNOSIS — I10 PRIMARY HYPERTENSION: ICD-10-CM

## 2024-03-25 DIAGNOSIS — E78.2 MIXED HYPERLIPIDEMIA: Primary | ICD-10-CM

## 2024-03-25 PROBLEM — E66.812 CLASS 2 SEVERE OBESITY DUE TO EXCESS CALORIES WITH SERIOUS COMORBIDITY AND BODY MASS INDEX (BMI) OF 38.0 TO 38.9 IN ADULT: Status: ACTIVE | Noted: 2024-03-25

## 2024-03-25 NOTE — PROGRESS NOTES
"Chief Complaint  Eczema (6 week follow up )    Subjective          Quique Valdez presents to [facility]  History of Present Illness  DM-has not been eating as healthy  Has gained a little weight  Denies low BS  HLD-no leg cramps  HTN-well controlled  Denies chest pain, headache, blurred vision, dizziness  Eczema-improved, but still flaring at time  Objective   Vital Signs:   /80 (BP Location: Left arm, Patient Position: Sitting, Cuff Size: Adult)   Pulse 77   Temp 98.4 °F (36.9 °C)   Resp 18   Ht 177.8 cm (70\")   Wt 122 kg (269 lb)   SpO2 96%   BMI 38.60 kg/m²     Physical Exam  Vitals and nursing note reviewed.   Constitutional:       General: He is not in acute distress.     Appearance: Normal appearance.   HENT:      Head: Normocephalic and atraumatic.      Right Ear: External ear normal.      Left Ear: External ear normal.      Nose: Nose normal.      Mouth/Throat:      Mouth: Mucous membranes are moist.   Eyes:      Conjunctiva/sclera: Conjunctivae normal.   Cardiovascular:      Rate and Rhythm: Normal rate and regular rhythm.      Pulses: Normal pulses.      Heart sounds: Normal heart sounds. No murmur heard.     No friction rub. No gallop.   Pulmonary:      Effort: Pulmonary effort is normal. No respiratory distress.      Breath sounds: No wheezing, rhonchi or rales.   Musculoskeletal:      Cervical back: Neck supple.      Right lower leg: No edema.      Left lower leg: No edema.   Skin:     General: Skin is warm and dry.   Neurological:      General: No focal deficit present.      Mental Status: He is alert and oriented to person, place, and time.   Psychiatric:         Mood and Affect: Mood normal.         Behavior: Behavior normal.        Result Review :          Procedures      Assessment and Plan    Diagnoses and all orders for this visit:    1. Mixed hyperlipidemia (Primary)  Comments:  Well-controlled, continue current meds    2. " Primary hypertension  Comments:  Fairly well-controlled, continue current meds    3. Type 2 diabetes mellitus without complication, without long-term current use of insulin  Comments:  Slight increase but still well-controlled.  Continue current meds and continue to work on healthy lifestyle habits  Orders:  -     CBC & Differential; Future  -     Comprehensive Metabolic Panel; Future  -     Hemoglobin A1c; Future  -     Lipid Panel; Future  -     TSH; Future    4. Class 2 severe obesity due to excess calories with serious comorbidity and body mass index (BMI) of 38.0 to 38.9 in adult  Assessment & Plan:  Patient's (Body mass index is 38.6 kg/m².) indicates that they are morbidly/severely obese (BMI > 40 or > 35 with obesity - related health condition) with health conditions that include hypertension, diabetes mellitus, and dyslipidemias . Weight is worsening. BMI  is above average; BMI management plan is completed. We discussed low calorie, low carb based diet program, portion control, and increasing exercise.       5. Eczema, unspecified type    Discussed eczema, keep skin moist by using moisturizing cream 1-2 times daily.  Use unscented soap and Free and clear laundry detergent.  Limit topical steroid use to the area when acutely inflamed only.  Discussed risk of skin thinning and hypopigmentation with chronic or frequent use of topical steroid.  Discussed avoiding using steroid on face and genitals.          Follow Up   Return in about 3 months (around 6/25/2024).  Patient was given instructions and counseling regarding his condition or for health maintenance advice. Please see specific information pulled into the AVS if appropriate.

## 2024-03-25 NOTE — ASSESSMENT & PLAN NOTE
Patient's (Body mass index is 38.6 kg/m².) indicates that they are morbidly/severely obese (BMI > 40 or > 35 with obesity - related health condition) with health conditions that include hypertension, diabetes mellitus, and dyslipidemias . Weight is worsening. BMI  is above average; BMI management plan is completed. We discussed low calorie, low carb based diet program, portion control, and increasing exercise.

## 2024-04-10 NOTE — TELEPHONE ENCOUNTER
** jed left to flaca this appt to [facility] 10/17 1000- Just need to cancel this appt if he calls back   Pt A&Ox4, NAD. Pt presents to the ED with complaints of right wrist pain. Swelling noted.

## 2024-06-04 ENCOUNTER — TELEPHONE (OUTPATIENT)
Dept: INTERNAL MEDICINE | Facility: CLINIC | Age: 74
End: 2024-06-04
Payer: OTHER GOVERNMENT

## 2024-06-04 ENCOUNTER — OFFICE VISIT (OUTPATIENT)
Dept: PODIATRY | Facility: CLINIC | Age: 74
End: 2024-06-04
Payer: MEDICARE

## 2024-06-04 VITALS
WEIGHT: 269 LBS | DIASTOLIC BLOOD PRESSURE: 82 MMHG | HEART RATE: 82 BPM | TEMPERATURE: 98.6 F | SYSTOLIC BLOOD PRESSURE: 151 MMHG | BODY MASS INDEX: 38.6 KG/M2

## 2024-06-04 DIAGNOSIS — E11.9 DIABETES MELLITUS WITHOUT COMPLICATION: ICD-10-CM

## 2024-06-04 DIAGNOSIS — G62.9 NEUROPATHY: ICD-10-CM

## 2024-06-04 DIAGNOSIS — M79.671 FOOT PAIN, BILATERAL: Primary | ICD-10-CM

## 2024-06-04 DIAGNOSIS — B35.1 ONYCHOMYCOSIS: ICD-10-CM

## 2024-06-04 DIAGNOSIS — L60.0 ONYCHOCRYPTOSIS: ICD-10-CM

## 2024-06-04 DIAGNOSIS — M79.672 FOOT PAIN, BILATERAL: Primary | ICD-10-CM

## 2024-06-04 DIAGNOSIS — E11.8 DM FEET: ICD-10-CM

## 2024-06-04 PROCEDURE — 11721 DEBRIDE NAIL 6 OR MORE: CPT | Performed by: PODIATRIST

## 2024-06-04 PROCEDURE — 1159F MED LIST DOCD IN RCRD: CPT | Performed by: PODIATRIST

## 2024-06-04 PROCEDURE — 3079F DIAST BP 80-89 MM HG: CPT | Performed by: PODIATRIST

## 2024-06-04 PROCEDURE — 1160F RVW MEDS BY RX/DR IN RCRD: CPT | Performed by: PODIATRIST

## 2024-06-04 PROCEDURE — 3077F SYST BP >= 140 MM HG: CPT | Performed by: PODIATRIST

## 2024-06-04 RX ORDER — AMLODIPINE BESYLATE AND BENAZEPRIL HYDROCHLORIDE 5; 20 MG/1; MG/1
1 CAPSULE ORAL DAILY
Qty: 90 CAPSULE | Refills: 1 | Status: SHIPPED | OUTPATIENT
Start: 2024-06-04

## 2024-06-04 RX ORDER — DULAGLUTIDE 0.75 MG/.5ML
0.75 INJECTION, SOLUTION SUBCUTANEOUS WEEKLY
Qty: 6 ML | Refills: 0 | Status: SHIPPED | OUTPATIENT
Start: 2024-06-04

## 2024-06-04 NOTE — PROGRESS NOTES
[facility] - PODIATRY    Today's Date: 06/04/24    Patient Name: Quique Valdez  MRN: [id number]  CSN: [id number]  PCP: Charmaine Pate APRN, Last PCP Visit: 3/25/2024  Referring Provider: No ref. provider found    SUBJECTIVE     Chief Complaint   Patient presents with    Left Foot - Follow-up, Nail Problem, Diabetes    Right Foot - Follow-up, Nail Problem, Diabetes     HPI: Quique Valdez, a 73 y.o.male, comes to clinic.    New, Established, New Problem:  Established  Location:  Toenails  Duration:   Greater than five years  Onset:  Gradual  Nature:  sore with palpation.  Stable, worsening, improving:  stable  Aggravating factors:  Pain with shoe gear and ambulation.  Previous Treatment:  debridement    Medical changes: No changes    Patient denies any fevers, chills, nausea, vomiting, shortness of breath, nor any other constitutional signs nor symptoms.       I have reviewed/confirmed previously documented HPI with no changes.       Past Medical History:   Diagnosis Date    Diabetes mellitus     Elevated WBC count     GERD (gastroesophageal reflux disease)     High cholesterol     Hypertension     Leukocytosis     Sleep apnea      Past Surgical History:   Procedure Laterality Date    CIRCUMCISION N/A 1/13/2023    Procedure: Cystoscopy and Circumcision;  Surgeon: Lashonda Vargas MD;  Location: [facility];  Service: Urology;  Laterality: N/A;    PROSTATE BIOPSY      UMBILICAL HERNIA REPAIR       Family History   Problem Relation Age of Onset    Lung cancer Other      Social History     Socioeconomic History    Marital status:     Number of children: 0   Tobacco Use    Smoking status: Former    Smokeless tobacco: Never    Tobacco comments:     10-25 PACK YEARS   Vaping Use    Vaping status: Never Used   Substance and Sexual Activity    Alcohol use: Not Currently     Comment: occasional    Drug use: Not Currently    Sexual activity: Defer     No Known Allergies  Current Outpatient  Medications   Medication Sig Dispense Refill    acetaminophen (TYLENOL) 500 MG tablet Take 1 tablet by mouth Every 6 (Six) Hours As Needed for Mild Pain. 120 tablet 1    amLODIPine-benazepril (LOTREL 5-20) 5-20 MG per capsule Take 1 capsule by mouth Daily. 90 capsule 1    aspirin 81 MG EC tablet Take 1 tablet by mouth Daily.      Blood Pressure Monitoring (Blood Pressure Cuff) misc Use to take blood 1-2 times a day 1 each 0    cetirizine (zyrTEC) 10 MG tablet Take 1 tablet by mouth Daily. 90 tablet 1    Diclofenac Sodium (Voltaren) 1 % gel gel Apply 4 g topically to the appropriate area as directed 4 (Four) Times a Day As Needed (hand pain). 100 g 1    Dulaglutide (Trulicity) 0.75 MG/0.5ML solution pen-injector Inject 0.75 mg under the skin into the appropriate area as directed 1 (One) Time Per Week. 6 mL 0    emollient cream       finasteride (PROSCAR) 5 MG tablet Take 1 tablet by mouth Daily. 90 tablet 3    fluocinonide (LIDEX) 0.05 % cream Apply 1 Application topically to the appropriate area as directed Every 8 (Eight) Hours.      hydroCHLOROthiazide 12.5 MG tablet Take 1 tablet by mouth Daily. 90 tablet 1    metFORMIN ER (GLUCOPHAGE-XR) 500 MG 24 hr tablet Take 2 tablets by mouth Daily With Breakfast. 180 tablet 0    omeprazole (priLOSEC) 20 MG capsule Take 1 capsule by mouth Daily. 90 capsule 1    oxybutynin XL (DITROPAN-XL) 5 MG 24 hr tablet Take 2 tablets by mouth Daily. 90 tablet 3    rosuvastatin (Crestor) 20 MG tablet Take 1 tablet by mouth Daily. 90 tablet 1    sildenafil (VIAGRA) 100 MG tablet Take 1 tablet by mouth Daily As Needed for Erectile Dysfunction. 20 tablet 2    triamcinolone (KENALOG) 0.5 % cream Apply 1 application  topically to the appropriate area as directed 3 (Three) Times a Day. 45 g 0    doxazosin (Cardura) 4 MG tablet Take 1 tablet by mouth Daily for 360 days. 90 tablet 3     No current facility-administered medications for this visit.     Review of Systems   Constitutional: Negative.     Skin:         Painful toenails   Neurological:  Positive for numbness.   All other systems reviewed and are negative.      OBJECTIVE     Vitals:    06/04/24 0824   BP: 151/82   Pulse: 82   Temp: 98.6 °F (37 °C)       Patient seen in no apparent distress.      PHYSICAL EXAM:     Foot/Ankle Exam    GENERAL  Appearance:  obese, elderly and chronically ill  Orientation:  AAOx3  Affect:  appropriate  Gait:  unimpaired  Assistance:  independent  Right shoe gear: casual shoe  Left shoe gear: casual shoe    VASCULAR     Right Foot Vascularity   Dorsalis pedis:  2+  Posterior tibial:  2+  Skin temperature:  warm  Edema grading:  None  CFT:  < 3 seconds  Pedal hair growth:  Absent  Varicosities:  moderate varicosities     Left Foot Vascularity   Dorsalis pedis:  2+  Posterior tibial:  2+  Skin temperature:  warm  Edema grading:  None  CFT:  < 3 seconds  Pedal hair growth:  Present  Varicosities:  moderate varicosities     NEUROLOGIC     Right Foot Neurologic   Light touch sensation: diminished  Vibratory sensation: diminished  Hot/Cold sensation: diminished  Protective Sensation using Hutchinson-Jaylan Monofilament:   Sites intact: 4  Sites tested: 10     Left Foot Neurologic   Light touch sensation: diminished  Vibratory sensation: diminished  Hot/Cold sensation:  diminished  Protective Sensation using Hutchinson-Jaylan Monofilament:   Sites intact: 4  Sites tested: 10    MUSCLE STRENGTH     Right Foot Muscle Strength   Foot dorsiflexion:  4  Foot plantar flexion:  4  Foot inversion:  4  Foot eversion:  4     Left Foot Muscle Strength   Foot dorsiflexion:  4  Foot plantar flexion:  4  Foot inversion:  4  Foot eversion:  4    RANGE OF MOTION     Right Foot Range of Motion   Foot and ankle ROM within normal limits       Left Foot Range of Motion   Foot and ankle ROM within normal limits      DERMATOLOGIC      Right Foot Dermatologic   Skin  Right foot skin is intact.   Nails  2.  Positive for elongated, onychomycosis,  abnormal thickness, subungual debris and ingrown toenail.  3.  Positive for elongated, onychomycosis, abnormal thickness, subungual debris and ingrown toenail.  4.  Positive for elongated, onychomycosis, abnormal thickness, subungual debris and ingrown toenail.  5.  Positive for elongated, onychomycosis, abnormal thickness, subungual debris and ingrown toenail.     Left Foot Dermatologic   Skin  Left foot skin is intact.   Nails  2.  Positive for elongated, onychomycosis, abnormal thickness, subungual debris and ingrown toenail.  3.  Positive for elongated, onychomycosis, abnormal thickness, subungual debris and ingrown toenail.  4.  Positive for elongated, onychomycosis, abnormally thick, subungual debris and ingrown toenail.  5.  Positive for elongated, onychomycosis, abnormally thick, subungual debris and ingrown toenail.    I have reexamined the patient the results are consistent with the previously documented exam.    ASSESSMENT/PLAN     Diagnoses and all orders for this visit:    1. Foot pain, bilateral (Primary)    2. Onychocryptosis    3. Onychomycosis    4. Neuropathy    5. Diabetes mellitus without complication    6. DM feet    Comprehensive lower extremity examination and evaluation was performed.    Discussed findings and treatment plan including risks, benefits, and treatment options with patient in detail. Patient agreed with treatment plan.    Toenails 2, 3, 4, 5 on Right and 2, 3, 4, 5 on Left were debrided with nail nippers then filed with a Dremel nail brinda.  Patient tolerated procedure well without complications.    Patient is to monitor for recurrence and any new symptoms and to contact Dr. Javed's office for a follow-up appointment.      The patient states understanding and agreement with this plan.    An After Visit Summary was printed and given to the patient at discharge, including (if requested) any available informative/educational handouts regarding diagnosis, treatment, or  medications. All questions were answered to patient/family satisfaction. Should symptoms fail to improve or worsen they agree to call or return to clinic or to go to the Emergency Department. Discussed the importance of following up with any needed screening tests/labs/specialist appointments and any requested follow-up recommended by me today. Importance of maintaining follow-up discussed and patient accepts that missed appointments can delay diagnosis and potentially lead to worsening of conditions.    Return in about 9 weeks (around 8/6/2024) for Toenail Care., or sooner if acute issues arise.    I have reviewed the assessment and plan and verified the accuracy of it. No changes to assessment and plan since the information was documented. Jonathan Javed DPM 06/04/24     I have dictated this note utilizing Dragon Dictation.  Please note that portions of this note were completed with a voice recognition program.  Part of this note may be an electronic transcription/translation of spoken language to printed text using the Dragon Dictation System.      This document has been electronically signed by Jonathan Javed DPM on June 4, 2024 08:49 EDT

## 2024-06-04 NOTE — TELEPHONE ENCOUNTER
Patient needs refill on his amLODIPine-benazepril (LOTREL 5-20) 5-20 MG per capsule and Dulaglutide (Trulicity) 0.75 MG/0.5ML solution pen-injector

## 2024-06-11 ENCOUNTER — TELEPHONE (OUTPATIENT)
Dept: UROLOGY | Facility: CLINIC | Age: 74
End: 2024-06-11
Payer: OTHER GOVERNMENT

## 2024-06-11 DIAGNOSIS — N40.1 BENIGN PROSTATIC HYPERPLASIA WITH NOCTURIA: Primary | ICD-10-CM

## 2024-06-11 DIAGNOSIS — R35.1 BENIGN PROSTATIC HYPERPLASIA WITH NOCTURIA: Primary | ICD-10-CM

## 2024-06-14 ENCOUNTER — LAB (OUTPATIENT)
Dept: LAB | Facility: HOSPITAL | Age: 74
End: 2024-06-14
Payer: MEDICARE

## 2024-06-14 DIAGNOSIS — R35.1 BENIGN PROSTATIC HYPERPLASIA WITH NOCTURIA: ICD-10-CM

## 2024-06-14 DIAGNOSIS — N40.1 BENIGN PROSTATIC HYPERPLASIA WITH NOCTURIA: ICD-10-CM

## 2024-06-14 DIAGNOSIS — E11.9 TYPE 2 DIABETES MELLITUS WITHOUT COMPLICATION, WITHOUT LONG-TERM CURRENT USE OF INSULIN: ICD-10-CM

## 2024-06-14 LAB
ALBUMIN SERPL-MCNC: 4.2 G/DL (ref 3.5–5.2)
ALBUMIN/GLOB SERPL: 2 G/DL
ALP SERPL-CCNC: 125 U/L (ref 39–117)
ALT SERPL W P-5'-P-CCNC: 12 U/L (ref 1–41)
ANION GAP SERPL CALCULATED.3IONS-SCNC: 9.1 MMOL/L (ref 5–15)
AST SERPL-CCNC: 13 U/L (ref 1–40)
BASOPHILS # BLD MANUAL: 0.44 10*3/MM3 (ref 0–0.2)
BASOPHILS NFR BLD MANUAL: 2 % (ref 0–1.5)
BILIRUB SERPL-MCNC: 0.7 MG/DL (ref 0–1.2)
BUN SERPL-MCNC: 16 MG/DL (ref 8–23)
BUN/CREAT SERPL: 15 (ref 7–25)
CALCIUM SPEC-SCNC: 9.1 MG/DL (ref 8.6–10.5)
CHLORIDE SERPL-SCNC: 108 MMOL/L (ref 98–107)
CHOLEST SERPL-MCNC: 121 MG/DL (ref 0–200)
CO2 SERPL-SCNC: 25.9 MMOL/L (ref 22–29)
CREAT SERPL-MCNC: 1.07 MG/DL (ref 0.76–1.27)
DEPRECATED RDW RBC AUTO: 39.3 FL (ref 37–54)
EGFRCR SERPLBLD CKD-EPI 2021: 74.2 ML/MIN/1.73
EOSINOPHIL # BLD MANUAL: 0.44 10*3/MM3 (ref 0–0.4)
EOSINOPHIL NFR BLD MANUAL: 2 % (ref 0.3–6.2)
ERYTHROCYTE [DISTWIDTH] IN BLOOD BY AUTOMATED COUNT: 12.7 % (ref 12.3–15.4)
GLOBULIN UR ELPH-MCNC: 2.1 GM/DL
GLUCOSE SERPL-MCNC: 100 MG/DL (ref 65–99)
HCT VFR BLD AUTO: 41 % (ref 37.5–51)
HDLC SERPL-MCNC: 49 MG/DL (ref 40–60)
HGB BLD-MCNC: 13.6 G/DL (ref 13–17.7)
LDLC SERPL CALC-MCNC: 60 MG/DL (ref 0–100)
LDLC/HDLC SERPL: 1.26 {RATIO}
LYMPHOCYTES # BLD MANUAL: 12.24 10*3/MM3 (ref 0.7–3.1)
LYMPHOCYTES NFR BLD MANUAL: 5 % (ref 5–12)
MCH RBC QN AUTO: 28.8 PG (ref 26.6–33)
MCHC RBC AUTO-ENTMCNC: 33.2 G/DL (ref 31.5–35.7)
MCV RBC AUTO: 86.9 FL (ref 79–97)
MONOCYTES # BLD: 1.09 10*3/MM3 (ref 0.1–0.9)
NEUTROPHILS # BLD AUTO: 7.65 10*3/MM3 (ref 1.7–7)
NEUTROPHILS NFR BLD MANUAL: 35 % (ref 42.7–76)
NRBC BLD AUTO-RTO: 0 /100 WBC (ref 0–0.2)
PLAT MORPH BLD: NORMAL
PLATELET # BLD AUTO: 184 10*3/MM3 (ref 140–450)
PMV BLD AUTO: 10.5 FL (ref 6–12)
POTASSIUM SERPL-SCNC: 4.1 MMOL/L (ref 3.5–5.2)
PROT SERPL-MCNC: 6.3 G/DL (ref 6–8.5)
PSA SERPL-MCNC: 5.81 NG/ML (ref 0–4)
RBC # BLD AUTO: 4.72 10*6/MM3 (ref 4.14–5.8)
RBC MORPH BLD: NORMAL
SODIUM SERPL-SCNC: 143 MMOL/L (ref 136–145)
TRIGL SERPL-MCNC: 51 MG/DL (ref 0–150)
TSH SERPL DL<=0.05 MIU/L-ACNC: 0.81 UIU/ML (ref 0.27–4.2)
VARIANT LYMPHS NFR BLD MANUAL: 56 % (ref 19.6–45.3)
VLDLC SERPL-MCNC: 12 MG/DL (ref 5–40)
WBC MORPH BLD: NORMAL
WBC NRBC COR # BLD AUTO: 21.86 10*3/MM3 (ref 3.4–10.8)

## 2024-06-14 PROCEDURE — 36415 COLL VENOUS BLD VENIPUNCTURE: CPT

## 2024-06-14 PROCEDURE — 85025 COMPLETE CBC W/AUTO DIFF WBC: CPT

## 2024-06-14 PROCEDURE — 80053 COMPREHEN METABOLIC PANEL: CPT

## 2024-06-14 PROCEDURE — 84443 ASSAY THYROID STIM HORMONE: CPT

## 2024-06-14 PROCEDURE — 85007 BL SMEAR W/DIFF WBC COUNT: CPT

## 2024-06-14 PROCEDURE — 84153 ASSAY OF PSA TOTAL: CPT

## 2024-06-14 PROCEDURE — 80061 LIPID PANEL: CPT

## 2024-06-17 DIAGNOSIS — D72.829 LEUKOCYTOSIS, UNSPECIFIED TYPE: Primary | ICD-10-CM

## 2024-06-19 ENCOUNTER — OFFICE VISIT (OUTPATIENT)
Dept: UROLOGY | Facility: CLINIC | Age: 74
End: 2024-06-19
Payer: MEDICARE

## 2024-06-19 VITALS
HEART RATE: 69 BPM | BODY MASS INDEX: 38.08 KG/M2 | SYSTOLIC BLOOD PRESSURE: 170 MMHG | HEIGHT: 70 IN | DIASTOLIC BLOOD PRESSURE: 75 MMHG | WEIGHT: 266 LBS

## 2024-06-19 DIAGNOSIS — R97.20 ELEVATED PROSTATE SPECIFIC ANTIGEN (PSA): Primary | ICD-10-CM

## 2024-06-19 DIAGNOSIS — R39.15 URINARY URGENCY: ICD-10-CM

## 2024-06-19 DIAGNOSIS — N52.9 ERECTILE DYSFUNCTION, UNSPECIFIED ERECTILE DYSFUNCTION TYPE: ICD-10-CM

## 2024-06-19 DIAGNOSIS — R39.15 BENIGN PROSTATIC HYPERPLASIA (BPH) WITH URINARY URGENCY: ICD-10-CM

## 2024-06-19 DIAGNOSIS — N40.1 BENIGN PROSTATIC HYPERPLASIA (BPH) WITH URINARY URGENCY: ICD-10-CM

## 2024-06-19 LAB — URINE VOLUME: 0

## 2024-06-19 RX ORDER — DOXAZOSIN MESYLATE 4 MG/1
4 TABLET ORAL DAILY
Qty: 90 TABLET | Refills: 3 | Status: SHIPPED | OUTPATIENT
Start: 2024-06-19 | End: 2025-06-14

## 2024-06-19 RX ORDER — FINASTERIDE 5 MG/1
5 TABLET, FILM COATED ORAL DAILY
Qty: 90 TABLET | Refills: 3 | Status: SHIPPED | OUTPATIENT
Start: 2024-06-19

## 2024-06-19 RX ORDER — SILDENAFIL 100 MG/1
100 TABLET, FILM COATED ORAL DAILY PRN
Qty: 20 TABLET | Refills: 2 | Status: SHIPPED | OUTPATIENT
Start: 2024-06-19

## 2024-06-19 NOTE — PROGRESS NOTES
"    UROLOGY OFFICE FOLLOW UP NOTE    Subjective   HPI  Quique Valdez is a 73 y.o. male. Presents for follow-up elevated PSA, BPH with LUTS, urinary urgency and ED. On doxazosin and finasteride, oxybutynin and on-demand Viagra.     History of Present Illness  The patient reports no difficulty in urination, however, he occasionally experiences minor dribbling before reaching the bathroom.  He is taking all of his medications listed above.  Overall states he is doing well.    _____  Circumcision and cystoscopy, 1/13/2023     Cystoscopic findings with prostatomegaly with, bilateral coapting lateral lobes, median lobe, posterior bladder wall diverticulum; no significant trabeculations; no masses or tumors     PSA trend  6/14/2024: 5.81  5/18/2023: 5.680  11/22/2022: 7.48 (on finasteride)  11/24/21: 8.67  10/2020: 11.28  6/2019: 16.45  11/2017: 8.3  1/2017: 7.5  6/2016: 7.2  11/2015: 7.2  6/15: 7.2  3/2015: 7.8     Prostate biopsy: 6/18/2015-right and left negative for malignancy     MRI prostate 9/10/2019: 80 g prostate; PIRAD II x2 lesions; No suspicious lesions (PIRAD IV or V) for malignancy       Review of systems  A review of systems was performed, and positive findings are noted in the HPI.    Objective     Vital Signs:   /75   Pulse 69   Ht 177.8 cm (70\")   Wt 121 kg (266 lb)   BMI 38.17 kg/m²       Physical exam  No acute distress, well-nourished  Awake alert and oriented  Mood normal; affect normal  Physical Exam        Bladder Scan interpretation 06/19/2024    Estimation of residual urine via [email or web address] 3000 [place] Bladder Scan  Performed by: Hannah Maldonado RN  Residual Urine: 0 ml  Indication: Benign prostatic hyperplasia with nocturia    Urinary urgency    Elevated prostate specific antigen (PSA)    Erectile dysfunction, unspecified erectile dysfunction type    Benign prostatic hyperplasia (BPH) with urinary urgency   Position: Supine  Examination: Incremental scanning of the suprapubic area using 2.0 " MHz transducer using copious amounts of acoustic gel.   Findings: An anechoic area was demonstrated which represented the bladder, with measurement of residual urine as noted. I inspected this myself. In that the residual urine was stable or insignificant, refer to plan for treatment and plan necessary at this time.     Problem List:  Patient Active Problem List   Diagnosis    BPH (benign prostatic hyperplasia)    Elevated prostate specific antigen (PSA)    Esophageal reflux    Hypertensive disease    Mixed hyperlipidemia    Low grade B cell lymphoproliferative disorder    Leukocytosis    Sciatic leg pain    Type 2 diabetes mellitus without complication, without long-term current use of insulin    Phimosis of penis    Chest pain    Class 2 severe obesity due to excess calories with serious comorbidity and body mass index (BMI) of 38.0 to 38.9 in adult       Assessment & Plan   Diagnoses and all orders for this visit:    1. Benign prostatic hyperplasia with nocturia (Primary)  -     Bladder Scan    2. Urinary urgency    3. Elevated prostate specific antigen (PSA)  -     PSA DIAGNOSTIC; Future  -     PSA DIAGNOSTIC; Future    4. Erectile dysfunction, unspecified erectile dysfunction type  -     sildenafil (VIAGRA) 100 MG tablet; Take 1 tablet by mouth Daily As Needed for Erectile Dysfunction.  Dispense: 20 tablet; Refill: 2    5. Benign prostatic hyperplasia (BPH) with urinary urgency  -     finasteride (PROSCAR) 5 MG tablet; Take 1 tablet by mouth Daily.  Dispense: 90 tablet; Refill: 3  -     doxazosin (Cardura) 4 MG tablet; Take 1 tablet by mouth Daily for 360 days.  Dispense: 90 tablet; Refill: 3        Assessment & Plan  Doing well, emptying bladder without postvoid residual  Continue current medication regimen     the patient's Prostate-Specific Antigen (PSA) levels remain elevated, albeit largely stable compared to the previous year. His last prostate MRI was conducted in 2019.  Aware further workup via repeat  "MRI prostate to be considered at any time.  Notes understand there is no way to \"rule out\" prostate cancer; aware of his risk of having prostate cancer.  Wishes to continue to monitor the trend for now.    PSA in 6 months; will notify him of the result and further recommendation if needed    Otherwise follow-up 1 year PSA prior, PVR       All questions addressed      Patient or patient representative verbalized consent for the use of Ambient Listening during the visit with  Lashonda Vargas MD for chart documentation. 6/19/2024  08:52 EDT  "

## 2024-06-21 ENCOUNTER — LAB (OUTPATIENT)
Dept: LAB | Facility: HOSPITAL | Age: 74
End: 2024-06-21
Payer: MEDICARE

## 2024-06-21 DIAGNOSIS — E11.9 TYPE 2 DIABETES MELLITUS WITHOUT COMPLICATION, WITHOUT LONG-TERM CURRENT USE OF INSULIN: ICD-10-CM

## 2024-06-21 LAB — HBA1C MFR BLD: 6.1 % (ref 4.8–5.6)

## 2024-06-21 PROCEDURE — 83036 HEMOGLOBIN GLYCOSYLATED A1C: CPT

## 2024-06-25 ENCOUNTER — OFFICE VISIT (OUTPATIENT)
Dept: INTERNAL MEDICINE | Facility: CLINIC | Age: 74
End: 2024-06-25
Payer: MEDICARE

## 2024-06-25 VITALS
HEART RATE: 79 BPM | DIASTOLIC BLOOD PRESSURE: 74 MMHG | OXYGEN SATURATION: 98 % | RESPIRATION RATE: 18 BRPM | WEIGHT: 263 LBS | HEIGHT: 70 IN | SYSTOLIC BLOOD PRESSURE: 128 MMHG | TEMPERATURE: 97.7 F | BODY MASS INDEX: 37.65 KG/M2

## 2024-06-25 DIAGNOSIS — I10 PRIMARY HYPERTENSION: Chronic | ICD-10-CM

## 2024-06-25 DIAGNOSIS — D47.Z9 LOW GRADE B CELL LYMPHOPROLIFERATIVE DISORDER: ICD-10-CM

## 2024-06-25 DIAGNOSIS — E78.2 MIXED HYPERLIPIDEMIA: Primary | ICD-10-CM

## 2024-06-25 DIAGNOSIS — L30.9 ECZEMA, UNSPECIFIED TYPE: ICD-10-CM

## 2024-06-25 DIAGNOSIS — L84 CALLUS BETWEEN TOES: ICD-10-CM

## 2024-06-25 DIAGNOSIS — E11.9 TYPE 2 DIABETES MELLITUS WITHOUT COMPLICATION, WITHOUT LONG-TERM CURRENT USE OF INSULIN: ICD-10-CM

## 2024-06-25 PROCEDURE — 3044F HG A1C LEVEL LT 7.0%: CPT | Performed by: NURSE PRACTITIONER

## 2024-06-25 PROCEDURE — 3078F DIAST BP <80 MM HG: CPT | Performed by: NURSE PRACTITIONER

## 2024-06-25 PROCEDURE — 99214 OFFICE O/P EST MOD 30 MIN: CPT | Performed by: NURSE PRACTITIONER

## 2024-06-25 PROCEDURE — 1126F AMNT PAIN NOTED NONE PRSNT: CPT | Performed by: NURSE PRACTITIONER

## 2024-06-25 PROCEDURE — G2211 COMPLEX E/M VISIT ADD ON: HCPCS | Performed by: NURSE PRACTITIONER

## 2024-06-25 PROCEDURE — 3074F SYST BP LT 130 MM HG: CPT | Performed by: NURSE PRACTITIONER

## 2024-06-25 RX ORDER — CLOTRIMAZOLE 1 %
1 CREAM (GRAM) TOPICAL 2 TIMES DAILY
Qty: 45 G | Refills: 2 | Status: CANCELLED | OUTPATIENT
Start: 2024-06-25

## 2024-06-25 RX ORDER — METFORMIN HYDROCHLORIDE 500 MG/1
1000 TABLET, EXTENDED RELEASE ORAL
Qty: 180 TABLET | Refills: 1 | Status: SHIPPED | OUTPATIENT
Start: 2024-06-25

## 2024-06-25 NOTE — PROGRESS NOTES
"Chief Complaint  Eczema (Still bothering patient due to it hard to apply cream on areas. Areas between toes on left foot are the worst. ), Diabetes (3 month follow up. Questions about Metformin and A1C. ), Hyperlipidemia, and Hypertension    Subjective          Quique Valdez presents to [facility]  History of Present Illness  Eczema  Difficulty using creams on certain areas, back  He reports noticing irritation between 4th and 5th digit on left foot  DM-no lows  Well controlled  Toelrating GLP 1 well  Reports getting off diet but still trying to eat healthiier    HLD-no leg cramps  HTN-well controlled  Denies chest pain, headache, dizziness  Objective   Vital Signs:   /74 (BP Location: Left arm, Patient Position: Sitting, Cuff Size: Large Adult)   Pulse 79   Temp 97.7 °F (36.5 °C)   Resp 18   Ht 177.8 cm (70\")   Wt 119 kg (263 lb)   SpO2 98%   BMI 37.74 kg/m²     Physical Exam  Vitals and nursing note reviewed.   Constitutional:       General: He is not in acute distress.     Appearance: Normal appearance.   HENT:      Head: Normocephalic and atraumatic.      Right Ear: External ear normal.      Left Ear: External ear normal.      Nose: Nose normal.      Mouth/Throat:      Mouth: Mucous membranes are moist.   Eyes:      Conjunctiva/sclera: Conjunctivae normal.   Cardiovascular:      Rate and Rhythm: Normal rate and regular rhythm.      Pulses: Normal pulses.      Heart sounds: Normal heart sounds. No murmur heard.     No friction rub. No gallop.   Pulmonary:      Effort: Pulmonary effort is normal. No respiratory distress.      Breath sounds: No wheezing, rhonchi or rales.   Musculoskeletal:      Cervical back: Neck supple.      Right lower leg: No edema.      Left lower leg: No edema.   Skin:     General: Skin is warm and dry.   Neurological:      General: No focal deficit present.      Mental Status: He is alert and oriented to person, place, and " time.   Psychiatric:         Mood and Affect: Mood normal.         Behavior: Behavior normal.        Result Review :          Procedures      Assessment and Plan    Diagnoses and all orders for this visit:    1. Mixed hyperlipidemia (Primary)  Comments:  well controlled, cont current meds    2. Primary hypertension  Comments:  well controlled, cont current meds    3. Type 2 diabetes mellitus without complication, without long-term current use of insulin  Comments:  well controlled, cont current meds  Orders:  -     CBC & Differential; Future  -     Comprehensive Metabolic Panel; Future  -     Lipid Panel; Future  -     TSH; Future  -     Hemoglobin A1c; Future    4. Low grade B cell lymphoproliferative disorder  Comments:  Has not seen heme-onc since  Dr. Romero left.  Has appointment with Dr. Chanel August 1    5. Eczema, unspecified type  Comments:  Discussed moisturizer use.    6. Callus between toes  Comments:  Discussed likely callus between 4th-5th digit on left foot.  Will try using topical steroid/moisturizer and monitor closely.  Podiatry referral if not improving    Other orders  -     metFORMIN ER (GLUCOPHAGE-XR) 500 MG 24 hr tablet; Take 2 tablets by mouth Daily With Breakfast.  Dispense: 180 tablet; Refill: 1              Follow Up   Return in about 4 months (around 10/25/2024).  Patient was given instructions and counseling regarding his condition or for health maintenance advice. Please see specific information pulled into the AVS if appropriate.

## 2024-08-01 ENCOUNTER — OFFICE VISIT (OUTPATIENT)
Dept: ONCOLOGY | Facility: HOSPITAL | Age: 74
End: 2024-08-01
Payer: MEDICARE

## 2024-08-01 VITALS
OXYGEN SATURATION: 93 % | HEIGHT: 70 IN | DIASTOLIC BLOOD PRESSURE: 84 MMHG | WEIGHT: 263.6 LBS | BODY MASS INDEX: 37.74 KG/M2 | TEMPERATURE: 98.4 F | RESPIRATION RATE: 18 BRPM | SYSTOLIC BLOOD PRESSURE: 185 MMHG | HEART RATE: 70 BPM

## 2024-08-01 DIAGNOSIS — D47.Z9 LOW GRADE B CELL LYMPHOPROLIFERATIVE DISORDER: Primary | ICD-10-CM

## 2024-08-01 PROCEDURE — G0463 HOSPITAL OUTPT CLINIC VISIT: HCPCS | Performed by: INTERNAL MEDICINE

## 2024-08-01 NOTE — PROGRESS NOTES
Patient  Quique Valdez    [facility] HEMATOLOGY & ONCOLOGY    Chief Complaint  Low grade B cell lymphoproliferative disorder    Referring Provider: Florence Rodriguez PA-C  PCP: Charmaine Pate APRN    Subjective          Oncology/Hematology History Overview Note     CD 5+ B-cell lymphoproliferative disorder:  - elevated lymphocyte count noted in January 2020.  - 3/24/2020: Flow cytometry shows CD5 positive B-cell lymphoproliferative  disorder.  There is a monoclonal B-cell population with kappa light chain     restriction.  The phenotype is not typical for CLL/SLL or mantle cell lymphoma. Cytogenetic studies were normal.  Normal CLL FISH.             HPI  Patient comes in today for follow-up.  He denies any fever, recent infections, sweats, unintentional weight loss, or lymphadenopathy.  Patient denies any headache, blurry vision, reports he forgot to take his blood pressure medications today.    Review of Systems   Constitutional:  Positive for fatigue (5/10). Negative for appetite change, diaphoresis, fever, unexpected weight gain and unexpected weight loss.   HENT:  Negative for hearing loss, mouth sores, sore throat, swollen glands, trouble swallowing and voice change.    Eyes:  Negative for blurred vision.   Respiratory:  Negative for cough, shortness of breath and wheezing.    Cardiovascular:  Negative for chest pain and palpitations.   Gastrointestinal:  Negative for abdominal pain, blood in stool, constipation, diarrhea, nausea and vomiting.   Endocrine: Negative for cold intolerance and heat intolerance.   Genitourinary:  Negative for difficulty urinating, dysuria, frequency, hematuria and urinary incontinence.   Musculoskeletal:  Positive for arthralgias (Lt knee 5/10). Negative for back pain and myalgias.   Skin:  Negative for rash, skin lesions and wound.   Neurological:  Negative for dizziness, seizures, weakness, numbness and headache.   Hematological:  Does not  bruise/bleed easily.   Psychiatric/Behavioral:  Negative for depressed mood. The patient is not nervous/anxious.    All other systems reviewed and are negative.      Past Medical History:   Diagnosis Date    Diabetes mellitus     Elevated WBC count     GERD (gastroesophageal reflux disease)     High cholesterol     Hypertension     Leukocytosis     Sleep apnea      Past Surgical History:   Procedure Laterality Date    CIRCUMCISION N/A 1/13/2023    Procedure: Cystoscopy and Circumcision;  Surgeon: Lashonda Vargas MD;  [facility];  Service: Urology;  Laterality: N/A;    PROSTATE BIOPSY      UMBILICAL HERNIA REPAIR       Social History     Socioeconomic History    Marital status:     Number of children: 0   Tobacco Use    Smoking status: Former    Smokeless tobacco: Never    Tobacco comments:     10-25 PACK YEARS   Vaping Use    Vaping status: Never Used   Substance and Sexual Activity    Alcohol use: Not Currently     Comment: occasional    Drug use: Not Currently    Sexual activity: Defer     Family History   Problem Relation Age of Onset    Lung cancer Other        Objective   Physical Exam  Exam conducted with a chaperone present.   Constitutional:       General: He is not in acute distress.     Appearance: Normal appearance.   HENT:      Head: Normocephalic and atraumatic.   Eyes:      Extraocular Movements: Extraocular movements intact.      Conjunctiva/sclera: Conjunctivae normal.   Neck:      Comments: No bilateral cervical, supraclavicular, or axillary lymphadenopathy palpated.  Cardiovascular:      Pulses: Normal pulses.      Heart sounds: Normal heart sounds.   Pulmonary:      Effort: Pulmonary effort is normal.      Breath sounds: Normal breath sounds. No rhonchi or rales.   Abdominal:      General: Bowel sounds are normal. There is no distension.      Palpations: Abdomen is soft. There is no mass.      Tenderness: There is no abdominal tenderness.   Musculoskeletal:      Cervical  "back: Normal range of motion and neck supple.   Lymphadenopathy:      Cervical: No cervical adenopathy.   Skin:     General: Skin is warm and dry.   Neurological:      Mental Status: He is oriented to person, place, and time.           Vitals:    08/01/24 1517   BP: (!) 185/84   Pulse: 70   Resp: 18   Temp: 98.4 °F (36.9 °C)   TempSrc: Temporal   SpO2: 93%   Weight: 120 kg (263 lb 9.6 oz)   Height: 177.8 cm (70\")   PainSc: 0-No pain     ECOG score: 0         PHQ-9 Total Score:         Result Review :   The following data was reviewed by: Heber Chanel MD on 04/12/2023:  Lab Results   Component Value Date    HGB 13.6 06/14/2024    HCT 41.0 06/14/2024    MCV 86.9 06/14/2024     06/14/2024    WBC 21.86 (H) 06/14/2024    NEUTROABS 7.65 (H) 06/14/2024    LYMPHSABS 12.68 (H) 09/23/2022    MONOSABS 0.96 (H) 09/23/2022    EOSABS 0.44 (H) 06/14/2024    BASOSABS 0.44 (H) 06/14/2024     Lab Results   Component Value Date    GLUCOSE 100 (H) 06/14/2024    BUN 16 06/14/2024    CREATININE 1.07 06/14/2024     06/14/2024    K 4.1 06/14/2024     (H) 06/14/2024    CO2 25.9 06/14/2024    CALCIUM 9.1 06/14/2024    PROTEINTOT 6.3 06/14/2024    ALBUMIN 4.2 06/14/2024    BILITOT 0.7 06/14/2024    ALKPHOS 125 (H) 06/14/2024    AST 13 06/14/2024    ALT 12 06/14/2024          Assessment and Plan    Diagnoses and all orders for this visit:    1. Low grade B cell lymphoproliferative disorder (Primary)  -     CBC & Differential; Future  -     Comprehensive Metabolic Panel; Future  -     Lactate Dehydrogenase; Future          CD5+ B-cell lymphoproliferative disorder:  The patient has a slight increase in his lymphocyte count but no other cytopenias to warrant additional work up at this time.  He denies any B symptoms. He will f/u with me in 6 months with repeat CBC, CMP, and LDH.  Pt counseled on concerning symptoms that would warrant follow up in my clinic sooner than his next clinic appointment.       Please note that " portions of this note were completed with a voice recognition program.    I spent 30 minutes caring for Quique gu which included review of medical record, reviewing lab work, discussing lab results, performing physical exam, medical documentation, and care coordination.      Patient was given instructions and counseling regarding his condition or for health maintenance advice. Please see specific information pulled into the AVS if appropriate.     Electronically signed by Heber Chanel MD, 08/01/24, 4:16 PM EDT.

## 2024-08-13 ENCOUNTER — TELEPHONE (OUTPATIENT)
Dept: INTERNAL MEDICINE | Facility: CLINIC | Age: 74
End: 2024-08-13
Payer: MEDICARE

## 2024-08-23 DIAGNOSIS — R39.15 BENIGN PROSTATIC HYPERPLASIA (BPH) WITH URINARY URGENCY: ICD-10-CM

## 2024-08-23 DIAGNOSIS — N40.1 BENIGN PROSTATIC HYPERPLASIA (BPH) WITH URINARY URGENCY: ICD-10-CM

## 2024-08-23 DIAGNOSIS — N52.9 ERECTILE DYSFUNCTION, UNSPECIFIED ERECTILE DYSFUNCTION TYPE: ICD-10-CM

## 2024-08-23 NOTE — TELEPHONE ENCOUNTER
Pt is needing refills on Dulaglutide (Trulicity) 0.75 MG/0.5ML solution pen-injector, sildenafil (VIAGRA) 100 MG tablet, and finasteride (PROSCAR) 5 MG tablet. He would like a call when they are sent in.

## 2024-08-26 RX ORDER — DULAGLUTIDE 0.75 MG/.5ML
0.75 INJECTION, SOLUTION SUBCUTANEOUS WEEKLY
Qty: 6 ML | Refills: 0 | Status: SHIPPED | OUTPATIENT
Start: 2024-08-26

## 2024-08-26 RX ORDER — FINASTERIDE 5 MG/1
5 TABLET, FILM COATED ORAL DAILY
Qty: 90 TABLET | Refills: 3 | Status: SHIPPED | OUTPATIENT
Start: 2024-08-26

## 2024-08-26 RX ORDER — SILDENAFIL 100 MG/1
100 TABLET, FILM COATED ORAL DAILY PRN
Qty: 20 TABLET | Refills: 2 | Status: SHIPPED | OUTPATIENT
Start: 2024-08-26

## 2024-08-27 ENCOUNTER — TELEPHONE (OUTPATIENT)
Dept: INTERNAL MEDICINE | Facility: CLINIC | Age: 74
End: 2024-08-27
Payer: MEDICARE

## 2024-08-27 ENCOUNTER — TELEPHONE (OUTPATIENT)
Dept: UROLOGY | Facility: CLINIC | Age: 74
End: 2024-08-27
Payer: MEDICARE

## 2024-08-27 DIAGNOSIS — R39.15 BENIGN PROSTATIC HYPERPLASIA (BPH) WITH URINARY URGENCY: ICD-10-CM

## 2024-08-27 DIAGNOSIS — N40.1 BENIGN PROSTATIC HYPERPLASIA (BPH) WITH URINARY URGENCY: ICD-10-CM

## 2024-08-27 RX ORDER — DOXAZOSIN 4 MG/1
4 TABLET ORAL DAILY
Qty: 90 TABLET | Refills: 2 | Status: SHIPPED | OUTPATIENT
Start: 2024-08-27 | End: 2025-08-22

## 2024-08-27 RX ORDER — OXYBUTYNIN CHLORIDE 5 MG/1
10 TABLET, EXTENDED RELEASE ORAL DAILY
Qty: 90 TABLET | Refills: 3 | Status: SHIPPED | OUTPATIENT
Start: 2024-08-27

## 2024-08-27 NOTE — TELEPHONE ENCOUNTER
Call pt lv to call us back, oxybutynin has been sent to the pharmacy.     Patient needs to contact urology for doxazosin refill.

## 2024-08-27 NOTE — TELEPHONE ENCOUNTER
Caller: Quique Costelloiken  Relationship: self  Best call back number: [phone]  Requested Prescriptions:    doxazosin (Cardura) 4 MG tablet        oxybutynin XL (DITROPAN-XL) 5 MG 24 hr tablet     Pharmacy where request should be sent:      [facility] - [place], [place] - [address] [place] [address] [phone]  - [phone]    [address] [facility] [address]   Phone: [phone] Fax: [phone]     Additional details provided by patient:    Does the patient have less than a 3 day supply:  [ ] Yes  [ x] No  Would you like a call back once the refill request has been completed: [ x] Yes [ ] No  If the office needs to give you a call back, can they leave a voicemail: [x ] Yes [ ] No

## 2024-08-27 NOTE — TELEPHONE ENCOUNTER
PATIENT CALLED AND ASKED FOR REFILLS ON DOXAZOSIN 4 MG TO GO TO [facility].    #[phone]

## 2024-09-03 ENCOUNTER — OFFICE VISIT (OUTPATIENT)
Dept: PODIATRY | Facility: CLINIC | Age: 74
End: 2024-09-03
Payer: MEDICARE

## 2024-09-03 VITALS
SYSTOLIC BLOOD PRESSURE: 102 MMHG | HEART RATE: 74 BPM | DIASTOLIC BLOOD PRESSURE: 70 MMHG | TEMPERATURE: 98.5 F | WEIGHT: 264 LBS | OXYGEN SATURATION: 97 % | BODY MASS INDEX: 37.88 KG/M2

## 2024-09-03 DIAGNOSIS — G62.9 NEUROPATHY: Primary | ICD-10-CM

## 2024-09-03 DIAGNOSIS — E11.9 DIABETES MELLITUS WITHOUT COMPLICATION: ICD-10-CM

## 2024-09-03 DIAGNOSIS — L60.0 ONYCHOCRYPTOSIS: ICD-10-CM

## 2024-09-03 DIAGNOSIS — M79.671 FOOT PAIN, BILATERAL: ICD-10-CM

## 2024-09-03 DIAGNOSIS — E11.8 DM FEET: ICD-10-CM

## 2024-09-03 DIAGNOSIS — M79.672 FOOT PAIN, BILATERAL: ICD-10-CM

## 2024-09-03 DIAGNOSIS — B35.1 ONYCHOMYCOSIS: ICD-10-CM

## 2024-09-03 PROCEDURE — 3074F SYST BP LT 130 MM HG: CPT | Performed by: PODIATRIST

## 2024-09-03 PROCEDURE — 11721 DEBRIDE NAIL 6 OR MORE: CPT | Performed by: PODIATRIST

## 2024-09-03 PROCEDURE — 1160F RVW MEDS BY RX/DR IN RCRD: CPT | Performed by: PODIATRIST

## 2024-09-03 PROCEDURE — 3078F DIAST BP <80 MM HG: CPT | Performed by: PODIATRIST

## 2024-09-03 PROCEDURE — 1159F MED LIST DOCD IN RCRD: CPT | Performed by: PODIATRIST

## 2024-09-03 NOTE — PROGRESS NOTES
[facility] - PODIATRY    Today's Date: 09/03/24    Patient Name: Quique Valdez  MRN: [id number]  CSN: [id number]  PCP: Charmaine Pate APRN, Last PCP Visit: 25 June 2024  Referring Provider: No ref. provider found    SUBJECTIVE     Chief Complaint   Patient presents with    Left Foot - Follow-up, Nail Problem    Right Foot - Follow-up, Nail Problem     HPI: Quique Valdez, a 73 y.o.male, comes to clinic.    New, Established, New Problem:  Established  Location:  Toenails  Duration:   Greater than five years  Onset:  Gradual  Nature:  sore with palpation.  Stable, worsening, improving:  stable  Aggravating factors:  Pain with shoe gear and ambulation.  Previous Treatment:  debridement    Medical changes: None    Patient states they are unsure of the most recent blood glucose reading.    Patient denies any fevers, chills, nausea, vomiting, shortness of breath, nor any other constitutional signs nor symptoms.       I have reviewed/confirmed previously documented HPI with no changes.       Past Medical History:   Diagnosis Date    Diabetes mellitus     Elevated WBC count     GERD (gastroesophageal reflux disease)     High cholesterol     Hypertension     Leukocytosis     Sleep apnea      Past Surgical History:   Procedure Laterality Date    CIRCUMCISION N/A 1/13/2023    Procedure: Cystoscopy and Circumcision;  Surgeon: Lashonda Vargas MD;  Location: [facility] OR;  Service: Urology;  Laterality: N/A;    PROSTATE BIOPSY      UMBILICAL HERNIA REPAIR       Family History   Problem Relation Age of Onset    Lung cancer Other      Social History     Socioeconomic History    Marital status:     Number of children: 0   Tobacco Use    Smoking status: Former    Smokeless tobacco: Never    Tobacco comments:     10-25 PACK YEARS   Vaping Use    Vaping status: Never Used   Substance and Sexual Activity    Alcohol use: Not Currently     Comment: occasional    Drug use: Not Currently    Sexual  activity: Defer     No Known Allergies  Current Outpatient Medications   Medication Sig Dispense Refill    acetaminophen (TYLENOL) 500 MG tablet Take 1 tablet by mouth Every 6 (Six) Hours As Needed for Mild Pain. 120 tablet 1    amLODIPine-benazepril (LOTREL 5-20) 5-20 MG per capsule Take 1 capsule by mouth Daily. 90 capsule 1    aspirin 81 MG EC tablet Take 1 tablet by mouth Daily.      Blood Pressure Monitoring (Blood Pressure Cuff) misc Use to take blood 1-2 times a day 1 each 0    cetirizine (zyrTEC) 10 MG tablet Take 1 tablet by mouth Daily. 90 tablet 1    Diclofenac Sodium (Voltaren) 1 % gel gel Apply 4 g topically to the appropriate area as directed 4 (Four) Times a Day As Needed (hand pain). 100 g 1    doxazosin (Cardura) 4 MG tablet Take 1 tablet by mouth Daily for 360 days. 90 tablet 2    Dulaglutide (Trulicity) 0.75 MG/0.5ML solution pen-injector Inject 0.75 mg under the skin into the appropriate area as directed 1 (One) Time Per Week. 6 mL 0    emollient cream       finasteride (PROSCAR) 5 MG tablet Take 1 tablet by mouth Daily. 90 tablet 3    fluocinonide (LIDEX) 0.05 % cream Apply 1 Application topically to the appropriate area as directed Every 8 (Eight) Hours.      hydroCHLOROthiazide 12.5 MG tablet Take 1 tablet by mouth Daily. 90 tablet 1    metFORMIN ER (GLUCOPHAGE-XR) 500 MG 24 hr tablet Take 2 tablets by mouth Daily With Breakfast. 180 tablet 1    omeprazole (priLOSEC) 20 MG capsule Take 1 capsule by mouth Daily. 90 capsule 1    oxybutynin XL (DITROPAN-XL) 5 MG 24 hr tablet Take 2 tablets by mouth Daily. 90 tablet 3    rosuvastatin (Crestor) 20 MG tablet Take 1 tablet by mouth Daily. 90 tablet 1    sildenafil (VIAGRA) 100 MG tablet Take 1 tablet by mouth Daily As Needed for Erectile Dysfunction. 20 tablet 2    triamcinolone (KENALOG) 0.5 % cream Apply 1 application  topically to the appropriate area as directed 3 (Three) Times a Day. 45 g 0     No current facility-administered medications for  this visit.     Review of Systems   Constitutional: Negative.    Skin:         Painful toenails   Neurological:  Positive for numbness.   All other systems reviewed and are negative.      OBJECTIVE     Vitals:    09/03/24 0825   BP: 102/70   Pulse: 74   Temp: 98.5 °F (36.9 °C)   SpO2: 97%       Patient seen in no apparent distress.      PHYSICAL EXAM:     Foot/Ankle Exam    GENERAL  Appearance:  obese, elderly and chronically ill  Orientation:  AAOx3  Affect:  appropriate  Gait:  unimpaired  Assistance:  independent  Right shoe gear: casual shoe  Left shoe gear: casual shoe    VASCULAR     Right Foot Vascularity   Dorsalis pedis:  2+  Posterior tibial:  2+  Skin temperature:  warm  Edema grading:  None  CFT:  < 3 seconds  Pedal hair growth:  Absent  Varicosities:  moderate varicosities     Left Foot Vascularity   Dorsalis pedis:  2+  Posterior tibial:  2+  Skin temperature:  warm  Edema grading:  None  CFT:  < 3 seconds  Pedal hair growth:  Present  Varicosities:  moderate varicosities     NEUROLOGIC     Right Foot Neurologic   Light touch sensation: diminished  Vibratory sensation: diminished  Hot/Cold sensation: diminished  Protective Sensation using [place] Monofilament:   Sites intact: 4  Sites tested: 10     Left Foot Neurologic   Light touch sensation: diminished  Vibratory sensation: diminished  Hot/Cold sensation:  diminished  Protective Sensation using [place] Monofilament:   Sites intact: 4  Sites tested: 10    MUSCLE STRENGTH     Right Foot Muscle Strength   Foot dorsiflexion:  4  Foot plantar flexion:  4  Foot inversion:  4  Foot eversion:  4     Left Foot Muscle Strength   Foot dorsiflexion:  4  Foot plantar flexion:  4  Foot inversion:  4  Foot eversion:  4    RANGE OF MOTION     Right Foot Range of Motion   Foot and ankle ROM within normal limits       Left Foot Range of Motion   Foot and ankle ROM within normal limits      DERMATOLOGIC      Right Foot Dermatologic   Skin  Right foot  skin is intact.   Nails  2.  Positive for elongated, onychomycosis, abnormal thickness, subungual debris and ingrown toenail.  3.  Positive for elongated, onychomycosis, abnormal thickness, subungual debris and ingrown toenail.  4.  Positive for elongated, onychomycosis, abnormal thickness, subungual debris and ingrown toenail.  5.  Positive for elongated, onychomycosis, abnormal thickness, subungual debris and ingrown toenail.     Left Foot Dermatologic   Skin  Left foot skin is intact.   Nails  2.  Positive for elongated, onychomycosis, abnormal thickness, subungual debris and ingrown toenail.  3.  Positive for elongated, onychomycosis, abnormal thickness, subungual debris and ingrown toenail.  4.  Positive for elongated, onychomycosis, abnormally thick, subungual debris and ingrown toenail.  5.  Positive for elongated, onychomycosis, abnormally thick, subungual debris and ingrown toenail.    I have reexamined the patient the results are consistent with the previously documented exam.    ASSESSMENT/PLAN     Diagnoses and all orders for this visit:    1. Neuropathy (Primary)    2. Onychomycosis    3. Onychocryptosis    4. Foot pain, bilateral    5. Diabetes mellitus without complication    6. DM feet    Comprehensive lower extremity examination and evaluation was performed.    Discussed findings and treatment plan including risks, benefits, and treatment options with patient in detail. Patient agreed with treatment plan.    Toenails 2, 3, 4, 5 on Right and 2, 3, 4, 5 on Left were debrided with nail nippers then filed with a Dremel nail brinda.  Patient tolerated procedure well without complications.    Patient is to monitor for recurrence and any new symptoms and to contact Dr. Javed's office for a follow-up appointment.      The patient states understanding and agreement with this plan.    An After Visit Summary was printed and given to the patient at discharge, including (if requested) any available  informative/educational handouts regarding diagnosis, treatment, or medications. All questions were answered to patient/family satisfaction. Should symptoms fail to improve or worsen they agree to call or return to clinic or to go to the Emergency Department. Discussed the importance of following up with any needed screening tests/labs/specialist appointments and any requested follow-up recommended by me today. Importance of maintaining follow-up discussed and patient accepts that missed appointments can delay diagnosis and potentially lead to worsening of conditions.    Return in about 9 weeks (around 11/5/2024) for Toenail Care., or sooner if acute issues arise.    I have reviewed the assessment and plan and verified the accuracy of it. No changes to assessment and plan since the information was documented. Jonathan Javed DPM 09/03/24     I have dictated this note utilizing Dragon Dictation.  Please note that portions of this note were completed with a voice recognition program.  Part of this note may be an electronic transcription/translation of spoken language to printed text using the Dragon Dictation System.      This document has been electronically signed by Jonathan Javed DPM on September 3, 2024 08:32 EDT

## 2024-09-30 ENCOUNTER — TELEPHONE (OUTPATIENT)
Dept: INTERNAL MEDICINE | Facility: CLINIC | Age: 74
End: 2024-09-30
Payer: MEDICARE

## 2024-09-30 RX ORDER — ROSUVASTATIN CALCIUM 20 MG/1
20 TABLET, COATED ORAL DAILY
Qty: 90 TABLET | Refills: 1 | Status: SHIPPED | OUTPATIENT
Start: 2024-09-30

## 2024-09-30 RX ORDER — HYDROCHLOROTHIAZIDE 12.5 MG/1
12.5 TABLET ORAL DAILY
Qty: 90 TABLET | Refills: 1 | Status: SHIPPED | OUTPATIENT
Start: 2024-09-30

## 2024-09-30 RX ORDER — CETIRIZINE HYDROCHLORIDE 10 MG/1
10 TABLET ORAL DAILY
Qty: 90 TABLET | Refills: 1 | Status: SHIPPED | OUTPATIENT
Start: 2024-09-30

## 2024-09-30 NOTE — TELEPHONE ENCOUNTER
Caller: Quique Valdez   Relationship: self  Best call back number: [phone]  Requested Prescriptions:    cetirizine (zyrTEC) 10 MG tablet        hydroCHLOROthiazide 12.5 MG tablet  omeprazole (priLOSEC) 20 MG capsule        rosuvastatin (Crestor) 20 MG tablet     Pharmacy where request should be sent:      [facility] - [place], [place] - [address] [place] [address] - [phone]  - [phone]    [address] [facility] [address]   Phone: [phone] Fax: [phone]     Additional details provided by patient:  Does the patient have less than a 3 day supply:  [ ] Yes  [x ] No  Would you like a call back once the refill request has been completed: [ x] Yes [ ] No  If the office needs to give you a call back, can they leave a voicemail: [x ] Yes [ ] No

## 2024-09-30 NOTE — TELEPHONE ENCOUNTER
Name: Quique Valdez    Relationship: Self    Best Callback Number: [phone]     HUB PROVIDED THE RELAY MESSAGE FROM THE OFFICE   PATIENT VOICED UNDERSTANDING AND HAS NO FURTHER QUESTIONS AT THIS TIME

## 2024-11-04 ENCOUNTER — LAB (OUTPATIENT)
Dept: LAB | Facility: HOSPITAL | Age: 74
End: 2024-11-04
Payer: MEDICARE

## 2024-11-04 DIAGNOSIS — E11.9 TYPE 2 DIABETES MELLITUS WITHOUT COMPLICATION, WITHOUT LONG-TERM CURRENT USE OF INSULIN: ICD-10-CM

## 2024-11-04 LAB
ALBUMIN SERPL-MCNC: 4.4 G/DL (ref 3.5–5.2)
ALBUMIN/GLOB SERPL: 1.9 G/DL
ALP SERPL-CCNC: 152 U/L (ref 39–117)
ALT SERPL W P-5'-P-CCNC: 16 U/L (ref 1–41)
ANION GAP SERPL CALCULATED.3IONS-SCNC: 11.3 MMOL/L (ref 5–15)
AST SERPL-CCNC: 19 U/L (ref 1–40)
BASOPHILS # BLD MANUAL: 0 10*3/MM3 (ref 0–0.2)
BASOPHILS NFR BLD MANUAL: 0 % (ref 0–1.5)
BILIRUB SERPL-MCNC: 0.4 MG/DL (ref 0–1.2)
BUN SERPL-MCNC: 24 MG/DL (ref 8–23)
BUN/CREAT SERPL: 18.6 (ref 7–25)
CALCIUM SPEC-SCNC: 9.4 MG/DL (ref 8.6–10.5)
CHLORIDE SERPL-SCNC: 103 MMOL/L (ref 98–107)
CHOLEST SERPL-MCNC: 142 MG/DL (ref 0–200)
CO2 SERPL-SCNC: 25.7 MMOL/L (ref 22–29)
CREAT SERPL-MCNC: 1.29 MG/DL (ref 0.76–1.27)
DACRYOCYTES BLD QL SMEAR: ABNORMAL
DEPRECATED RDW RBC AUTO: 39.1 FL (ref 37–54)
EGFRCR SERPLBLD CKD-EPI 2021: 59.3 ML/MIN/1.73
EOSINOPHIL # BLD MANUAL: 0 10*3/MM3 (ref 0–0.4)
EOSINOPHIL NFR BLD MANUAL: 0 % (ref 0.3–6.2)
ERYTHROCYTE [DISTWIDTH] IN BLOOD BY AUTOMATED COUNT: 12.1 % (ref 12.3–15.4)
GLOBULIN UR ELPH-MCNC: 2.3 GM/DL
GLUCOSE SERPL-MCNC: 101 MG/DL (ref 65–99)
HBA1C MFR BLD: 5.8 % (ref 4.8–5.6)
HCT VFR BLD AUTO: 40.9 % (ref 37.5–51)
HDLC SERPL-MCNC: 55 MG/DL (ref 40–60)
HGB BLD-MCNC: 13.2 G/DL (ref 13–17.7)
LDLC SERPL CALC-MCNC: 68 MG/DL (ref 0–100)
LDLC/HDLC SERPL: 1.2 {RATIO}
LYMPHOCYTES # BLD MANUAL: 16.74 10*3/MM3 (ref 0.7–3.1)
LYMPHOCYTES NFR BLD MANUAL: 7.2 % (ref 5–12)
MCH RBC QN AUTO: 28.5 PG (ref 26.6–33)
MCHC RBC AUTO-ENTMCNC: 32.3 G/DL (ref 31.5–35.7)
MCV RBC AUTO: 88.3 FL (ref 79–97)
MONOCYTES # BLD: 1.95 10*3/MM3 (ref 0.1–0.9)
NEUTROPHILS # BLD AUTO: 8.36 10*3/MM3 (ref 1.7–7)
NEUTROPHILS NFR BLD MANUAL: 30.9 % (ref 42.7–76)
NRBC BLD AUTO-RTO: 0 /100 WBC (ref 0–0.2)
PLAT MORPH BLD: NORMAL
PLATELET # BLD AUTO: 219 10*3/MM3 (ref 140–450)
PMV BLD AUTO: 10.4 FL (ref 6–12)
POTASSIUM SERPL-SCNC: 4.9 MMOL/L (ref 3.5–5.2)
PROT SERPL-MCNC: 6.7 G/DL (ref 6–8.5)
RBC # BLD AUTO: 4.63 10*6/MM3 (ref 4.14–5.8)
SODIUM SERPL-SCNC: 140 MMOL/L (ref 136–145)
TRIGL SERPL-MCNC: 105 MG/DL (ref 0–150)
TSH SERPL DL<=0.05 MIU/L-ACNC: 1.32 UIU/ML (ref 0.27–4.2)
VARIANT LYMPHS NFR BLD MANUAL: 3.1 % (ref 0–5)
VARIANT LYMPHS NFR BLD MANUAL: 58.8 % (ref 19.6–45.3)
VLDLC SERPL-MCNC: 19 MG/DL (ref 5–40)
WBC MORPH BLD: NORMAL
WBC NRBC COR # BLD AUTO: 27.04 10*3/MM3 (ref 3.4–10.8)

## 2024-11-04 PROCEDURE — 83036 HEMOGLOBIN GLYCOSYLATED A1C: CPT

## 2024-11-04 PROCEDURE — 84443 ASSAY THYROID STIM HORMONE: CPT

## 2024-11-04 PROCEDURE — 85025 COMPLETE CBC W/AUTO DIFF WBC: CPT

## 2024-11-04 PROCEDURE — 80053 COMPREHEN METABOLIC PANEL: CPT

## 2024-11-04 PROCEDURE — 85007 BL SMEAR W/DIFF WBC COUNT: CPT

## 2024-11-04 PROCEDURE — 80061 LIPID PANEL: CPT

## 2024-11-06 ENCOUNTER — OFFICE VISIT (OUTPATIENT)
Dept: INTERNAL MEDICINE | Facility: CLINIC | Age: 74
End: 2024-11-06
Payer: MEDICARE

## 2024-11-06 VITALS
HEART RATE: 86 BPM | TEMPERATURE: 98.5 F | WEIGHT: 272.8 LBS | HEIGHT: 70 IN | SYSTOLIC BLOOD PRESSURE: 124 MMHG | DIASTOLIC BLOOD PRESSURE: 68 MMHG | OXYGEN SATURATION: 98 % | BODY MASS INDEX: 39.05 KG/M2

## 2024-11-06 DIAGNOSIS — I10 PRIMARY HYPERTENSION: Primary | Chronic | ICD-10-CM

## 2024-11-06 DIAGNOSIS — J06.9 ACUTE URI: ICD-10-CM

## 2024-11-06 DIAGNOSIS — E11.9 TYPE 2 DIABETES MELLITUS WITHOUT COMPLICATION, WITHOUT LONG-TERM CURRENT USE OF INSULIN: ICD-10-CM

## 2024-11-06 DIAGNOSIS — Z00.00 ENCOUNTER FOR ANNUAL WELLNESS EXAM IN MEDICARE PATIENT: ICD-10-CM

## 2024-11-06 DIAGNOSIS — E78.2 MIXED HYPERLIPIDEMIA: ICD-10-CM

## 2024-11-06 PROCEDURE — 99214 OFFICE O/P EST MOD 30 MIN: CPT | Performed by: NURSE PRACTITIONER

## 2024-11-06 PROCEDURE — 3074F SYST BP LT 130 MM HG: CPT | Performed by: NURSE PRACTITIONER

## 2024-11-06 PROCEDURE — 1126F AMNT PAIN NOTED NONE PRSNT: CPT | Performed by: NURSE PRACTITIONER

## 2024-11-06 PROCEDURE — 3078F DIAST BP <80 MM HG: CPT | Performed by: NURSE PRACTITIONER

## 2024-11-06 PROCEDURE — 3044F HG A1C LEVEL LT 7.0%: CPT | Performed by: NURSE PRACTITIONER

## 2024-11-06 PROCEDURE — 96160 PT-FOCUSED HLTH RISK ASSMT: CPT | Performed by: NURSE PRACTITIONER

## 2024-11-06 PROCEDURE — 1170F FXNL STATUS ASSESSED: CPT | Performed by: NURSE PRACTITIONER

## 2024-11-06 PROCEDURE — G0439 PPPS, SUBSEQ VISIT: HCPCS | Performed by: NURSE PRACTITIONER

## 2024-11-06 RX ORDER — DULAGLUTIDE 0.75 MG/.5ML
0.75 INJECTION, SOLUTION SUBCUTANEOUS WEEKLY
Qty: 6 ML | Refills: 1 | Status: SHIPPED | OUTPATIENT
Start: 2024-11-06

## 2024-11-06 NOTE — PROGRESS NOTES
Subjective   The ABCs of the Annual Wellness Visit  Medicare Wellness Visit      Quique Valdez is a 73 y.o. patient who presents for a Medicare Wellness Visit.    The following portions of the patient's history were reviewed and   updated as appropriate: allergies, current medications, past family history, past medical history, past social history, past surgical history, and problem list.    Compared to one year ago, the patient's physical   health is the same.  Compared to one year ago, the patient's mental   health is the same.    Recent Hospitalizations:  He was not admitted to the hospital during the last year.     Current Medical Providers:  Patient Care Team:  Charmaine Pate APRN as PCP - General (Nurse Practitioner)  Ijeoma Romero MD PhD as Consulting Physician (Hematology and Oncology)  Lashonda Vargas MD as Consulting Physician (Urology)    Outpatient Medications Prior to Visit   Medication Sig Dispense Refill    acetaminophen (TYLENOL) 500 MG tablet Take 1 tablet by mouth Every 6 (Six) Hours As Needed for Mild Pain. 120 tablet 1    amLODIPine-benazepril (LOTREL 5-20) 5-20 MG per capsule Take 1 capsule by mouth Daily. 90 capsule 1    aspirin 81 MG EC tablet Take 1 tablet by mouth Daily.      Blood Pressure Monitoring (Blood Pressure Cuff) misc Use to take blood 1-2 times a day 1 each 0    cetirizine (zyrTEC) 10 MG tablet Take 1 tablet by mouth Daily. 90 tablet 1    Diclofenac Sodium (Voltaren) 1 % gel gel Apply 4 g topically to the appropriate area as directed 4 (Four) Times a Day As Needed (hand pain). 100 g 1    doxazosin (Cardura) 4 MG tablet Take 1 tablet by mouth Daily for 360 days. 90 tablet 2    emollient cream       finasteride (PROSCAR) 5 MG tablet Take 1 tablet by mouth Daily. 90 tablet 3    fluocinonide (LIDEX) 0.05 % cream Apply 1 Application topically to the appropriate area as directed Every 8 (Eight) Hours.      hydroCHLOROthiazide 12.5 MG tablet Take 1 tablet by mouth Daily.  90 tablet 1    metFORMIN ER (GLUCOPHAGE-XR) 500 MG 24 hr tablet Take 2 tablets by mouth Daily With Breakfast. 180 tablet 1    omeprazole (priLOSEC) 20 MG capsule Take 1 capsule by mouth Daily. 90 capsule 1    oxybutynin XL (DITROPAN-XL) 5 MG 24 hr tablet Take 2 tablets by mouth Daily. 90 tablet 3    rosuvastatin (Crestor) 20 MG tablet Take 1 tablet by mouth Daily. 90 tablet 1    sildenafil (VIAGRA) 100 MG tablet Take 1 tablet by mouth Daily As Needed for Erectile Dysfunction. 20 tablet 2    triamcinolone (KENALOG) 0.5 % cream Apply 1 application  topically to the appropriate area as directed 3 (Three) Times a Day. 45 g 0    Dulaglutide (Trulicity) 0.75 MG/0.5ML solution pen-injector Inject 0.75 mg under the skin into the appropriate area as directed 1 (One) Time Per Week. 6 mL 0     No facility-administered medications prior to visit.     No opioid medication identified on active medication list. I have reviewed chart for other potential  high risk medication/s and harmful drug interactions in the elderly.      Aspirin is on active medication list. Aspirin use is indicated based on review of current medical condition/s. Pros and cons of this therapy have been discussed today. Benefits of this medication outweigh potential harm.  Patient has been encouraged to continue taking this medication.  .      Patient Active Problem List   Diagnosis    BPH (benign prostatic hyperplasia)    Elevated prostate specific antigen (PSA)    Esophageal reflux    Hypertensive disease    Mixed hyperlipidemia    Low grade B cell lymphoproliferative disorder    Leukocytosis    Sciatic leg pain    Type 2 diabetes mellitus without complication, without long-term current use of insulin    Phimosis of penis    Chest pain    Class 2 severe obesity due to excess calories with serious comorbidity and body mass index (BMI) of 38.0 to 38.9 in adult     Advance Care Planning Advance Directive is on file.  ACP discussion was held with the patient  "during this visit. Patient has an advance directive in EMR which is still valid.             Objective   Vitals:    24 0813   BP: 124/68   Pulse: 86   Temp: 98.5 °F (36.9 °C)   SpO2: 98%   Weight: 124 kg (272 lb 12.8 oz)   Height: 177.8 cm (70\")   PainSc: 0-No pain       Estimated body mass index is 39.14 kg/m² as calculated from the following:    Height as of this encounter: 177.8 cm (70\").    Weight as of this encounter: 124 kg (272 lb 12.8 oz).            Does the patient have evidence of cognitive impairment? No  Lab Results   Component Value Date    TRIG 105 2024    HDL 55 2024    LDL 68 2024    VLDL 19 2024    HGBA1C 5.80 (H) 2024                                                                                                Health  Risk Assessment    Smoking Status:  Social History     Tobacco Use   Smoking Status Former    Current packs/day: 0.00    Average packs/day: 1 pack/day for 10.0 years (10.0 ttl pk-yrs)    Types: Cigarettes    Start date:     Quit date:     Years since quittin.8   Smokeless Tobacco Never   Tobacco Comments    10-25 PACK YEARS     Alcohol Consumption:  Social History     Substance and Sexual Activity   Alcohol Use Not Currently    Comment: occasional       Fall Risk Screen  STEADI Fall Risk Assessment was completed, and patient is at LOW risk for falls.Assessment completed on:2024    Depression Screenin/6/2024     8:12 AM   PHQ-2/PHQ-9 Depression Screening   Little interest or pleasure in doing things Not at all   Feeling down, depressed, or hopeless Not at all     Health Habits and Functional and Cognitive Screenin/6/2024     8:10 AM   Functional & Cognitive Status   Do you have difficulty preparing food and eating? No   Do you have difficulty bathing yourself, getting dressed or grooming yourself? No   Do you have difficulty using the toilet? No   Do you have difficulty moving around from place to place? No "   Do you have trouble with steps or getting out of a bed or a chair? No   Current Diet Well Balanced Diet   Dental Exam Up to date   Eye Exam Not up to date   Exercise (times per week) 0 times per week   Current Exercises Include No Regular Exercise   Do you need help using the phone?  No   Are you deaf or do you have serious difficulty hearing?  No   Do you need help to go to places out of walking distance? No   Do you need help shopping? No   Do you need help preparing meals?  No   Do you need help with housework?  No   Do you need help with laundry? No   Do you need help taking your medications? No   Do you need help managing money? No   Do you ever drive or ride in a car without wearing a seat belt? No   Have you felt unusual stress, anger or loneliness in the last month? No   Who do you live with? Spouse   If you need help, do you have trouble finding someone available to you? No   Have you been bothered in the last four weeks by sexual problems? No   Do you have difficulty concentrating, remembering or making decisions? No           Age-appropriate Screening Schedule:  Refer to the list below for future screening recommendations based on patient's age, sex and/or medical conditions. Orders for these recommended tests are listed in the plan section. The patient has been provided with a written plan.    Health Maintenance List  Health Maintenance   Topic Date Due    Pneumococcal Vaccine 65+ (1 of 2 - PCV) Never done    DIABETIC EYE EXAM  Never done    TDAP/TD VACCINES (1 - Tdap) Never done    ZOSTER VACCINE (1 of 2) Never done    AAA SCREEN (ONE-TIME)  Never done    URINE MICROALBUMIN  06/28/2024    COVID-19 Vaccine (3 - 2024-25 season) 09/01/2024    DIABETIC FOOT EXAM  03/12/2025    BMI FOLLOWUP  03/25/2025    HEMOGLOBIN A1C  05/04/2025    LIPID PANEL  11/04/2025    ANNUAL WELLNESS VISIT  11/06/2025    COLORECTAL CANCER SCREENING  01/29/2028    HEPATITIS C SCREENING  Completed    INFLUENZA VACCINE  Completed                                                                                                                                                 CMS Preventative Services Quick Reference  Risk Factors Identified During Encounter  Chronic Pain: Natural history and expected course discussed. Questions answered.  Immunizations Discussed/Encouraged: Influenza and Prevnar 20 (Pneumococcal 20-valent conjugate)  Polypharmacy: Medication List reviewed    The above risks/problems have been discussed with the patient.  Pertinent information has been shared with the patient in the After Visit Summary.  An After Visit Summary and PPPS were made available to the patient.    Follow Up:   Next Medicare Wellness visit to be scheduled in 1 year.         Additional E&M Note during same encounter follows:  Patient has additional, significant, and separately identifiable condition(s)/problem(s) that require work above and beyond the Medicare Wellness Visit     Chief Complaint  Medicare Wellness-subsequent and Follow-up    Subjective   HPI  Quique is also being seen today for additional medical problem/s.        History of Present Illness  The patient is a 73-year-old male who presents for a Medicare annual wellness visit, as well as follow-up on diabetes, hypertension, and hyperlipidemia.    He reports a weight gain of approximately 10 pounds since his last visit, which he attributes to dining out frequently with his wife. he has not experienced any episodes of low blood sugar or symptoms such as lightheadedness or jitteriness. He also reports no chest pain or dizziness. However, he does experience occasional headaches, which he believes are associated with his use of a CPAP machine. He reports not drinking enough water routinely. He has not had an eye examination in the past year.    He occasionally experiences leg cramps, particularly when getting out of bed. He admits to not drinking as much water as recommended. He has not  "experienced fevers or chills    IMMUNIZATIONS  He received influenza vaccine 1 month ago.           Objective   Vital Signs:  /68   Pulse 86   Temp 98.5 °F (36.9 °C)   Ht 177.8 cm (70\")   Wt 124 kg (272 lb 12.8 oz)   SpO2 98%   BMI 39.14 kg/m²   Physical Exam  Vitals and nursing note reviewed.   Constitutional:       General: He is not in acute distress.     Appearance: Normal appearance.   HENT:      Head: Normocephalic and atraumatic.      Right Ear: Tympanic membrane, ear canal and external ear normal.      Left Ear: Tympanic membrane, ear canal and external ear normal.      Nose: Nose normal. Rhinorrhea present.      Mouth/Throat:      Mouth: Mucous membranes are moist.      Pharynx: No posterior oropharyngeal erythema.   Eyes:      Conjunctiva/sclera: Conjunctivae normal.   Cardiovascular:      Rate and Rhythm: Normal rate and regular rhythm.      Pulses: Normal pulses.      Heart sounds: Normal heart sounds. No murmur heard.     No friction rub. No gallop.   Pulmonary:      Effort: Pulmonary effort is normal. No respiratory distress.      Breath sounds: No wheezing, rhonchi or rales.   Musculoskeletal:      Cervical back: Neck supple.      Right lower leg: No edema.      Left lower leg: No edema.   Lymphadenopathy:      Cervical: No cervical adenopathy.   Skin:     General: Skin is warm and dry.   Neurological:      General: No focal deficit present.      Mental Status: He is alert and oriented to person, place, and time.   Psychiatric:         Mood and Affect: Mood normal.         Behavior: Behavior normal.                 Assessment and Plan               Encounter for annual wellness exam in Medicare patient    Primary hypertension    Mixed hyperlipidemia     Type 2 diabetes mellitus without complication, without long-term current use of insulin    Acute URI      Orders Placed This Encounter   Procedures    Comprehensive Metabolic Panel     Standing Status:   Future     Standing Expiration Date: "   11/6/2025     Order Specific Question:   Release to patient     Answer:   Routine Release [[id number]]    Lipid Panel     Standing Status:   Future     Standing Expiration Date:   11/6/2025     Order Specific Question:   Release to patient     Answer:   Routine Release [[id number]]    TSH     Standing Status:   Future     Standing Expiration Date:   11/6/2025     Order Specific Question:   Release to patient     Answer:   Routine Release [[id number]]    MicroAlbumin, Urine, Random - Urine, Clean Catch     Standing Status:   Future     Standing Expiration Date:   11/6/2025     Order Specific Question:   Release to patient     Answer:   Routine Release [[id number]]    Hemoglobin A1c     Standing Status:   Future     Standing Expiration Date:   11/6/2025     Order Specific Question:   Release to patient     Answer:   Routine Release [[id number]]    CBC & Differential     Standing Status:   Future     Standing Expiration Date:   11/6/2025     Order Specific Question:   Manual Differential     Answer:   No     Order Specific Question:   Release to patient     Answer:   Routine Release [[id number]]     Assessment & Plan  1. Annual wellness visit.   His kidney function is slightly decreased from baseline, likely due to dehydration during fasting for labs.  He was advised to receive the Prevnar 20 vaccine.     2. Diabetes Mellitus.  His A1c has decreased to 5.8, indicating good control. He will continue his current medication regimen. He was advised to maintain a balanced diet and monitor his blood sugar levels regularly.A diabetic eye exam was recommended. His medications were refilled.    3. Hypertension.  His blood pressure is well-controlled today. He will continue his current medication regimen.    4. Hyperlipidemia.  His cholesterol levels are within normal range. He will continue his current medication regimen.    5. Leg cramps.  He was advised to increase his water intake. If the cramps persist or worsen, the  medication Crestor may be reconsidered.    6. URI  Improving.  He was advised to rest and stay hydrated.    7. Health Maintenance.  He was advised to receive the Prevnar 20 vaccine. A diabetic eye exam was recommended. He was also advised to get the shingles vaccine at [facility].    Follow-up  Return in 6 months for follow up.   Patient or patient representative verbalized consent for the use of Ambient Listening during the visit with  YEFRI Yung for chart documentation. 11/6/2024  08:18 EST  New Medications Ordered This Visit   Medications    Zoster Vac Recomb Adjuvanted 50 MCG/0.5ML reconstituted suspension     Sig: Inject 0.5 mL into the appropriate muscle as directed by prescriber 1 (One) Time for 1 dose. Repeat vaccine in 2-6 mths     Dispense:  0.5 mL     Refill:  1    Dulaglutide (Trulicity) 0.75 MG/0.5ML solution auto-injector     Sig: Inject 0.75 mg under the skin into the appropriate area as directed 1 (One) Time Per Week.     Dispense:  6 mL     Refill:  1          Follow Up   Return in about 6 months (around 5/6/2025).  Patient was given instructions and counseling regarding his condition or for health maintenance advice. Please see specific information pulled into the AVS if appropriate.

## 2024-12-02 ENCOUNTER — OFFICE VISIT (OUTPATIENT)
Dept: PODIATRY | Facility: CLINIC | Age: 74
End: 2024-12-02
Payer: MEDICARE

## 2024-12-02 VITALS
TEMPERATURE: 98 F | BODY MASS INDEX: 39.89 KG/M2 | WEIGHT: 278 LBS | OXYGEN SATURATION: 92 % | HEART RATE: 70 BPM | SYSTOLIC BLOOD PRESSURE: 163 MMHG | DIASTOLIC BLOOD PRESSURE: 89 MMHG

## 2024-12-02 DIAGNOSIS — E11.9 DIABETES MELLITUS WITHOUT COMPLICATION: ICD-10-CM

## 2024-12-02 DIAGNOSIS — B35.1 ONYCHOMYCOSIS: ICD-10-CM

## 2024-12-02 DIAGNOSIS — M79.671 FOOT PAIN, BILATERAL: ICD-10-CM

## 2024-12-02 DIAGNOSIS — E11.8 DM FEET: ICD-10-CM

## 2024-12-02 DIAGNOSIS — L60.0 ONYCHOCRYPTOSIS: ICD-10-CM

## 2024-12-02 DIAGNOSIS — M79.672 FOOT PAIN, BILATERAL: ICD-10-CM

## 2024-12-02 DIAGNOSIS — G62.9 NEUROPATHY: Primary | ICD-10-CM

## 2024-12-02 PROCEDURE — 1160F RVW MEDS BY RX/DR IN RCRD: CPT | Performed by: PODIATRIST

## 2024-12-02 PROCEDURE — 11721 DEBRIDE NAIL 6 OR MORE: CPT | Performed by: PODIATRIST

## 2024-12-02 PROCEDURE — 3077F SYST BP >= 140 MM HG: CPT | Performed by: PODIATRIST

## 2024-12-02 PROCEDURE — 3079F DIAST BP 80-89 MM HG: CPT | Performed by: PODIATRIST

## 2024-12-02 PROCEDURE — 1159F MED LIST DOCD IN RCRD: CPT | Performed by: PODIATRIST

## 2024-12-02 NOTE — PROGRESS NOTES
[facility] STOUT - PODIATRY    Today's Date: 24    Patient Name: Quique Valdez  MRN: [id number]  CSN: [id number]  PCP: Charmaine Pate APRN, Last PCP Visit: 2024  Referring Provider: No ref. provider found    SUBJECTIVE     Chief Complaint   Patient presents with    Left Foot - Follow-up, Nail Problem    Right Foot - Follow-up, Nail Problem     HPI: Quique Valdez, a 74 y.o.male, comes to clinic.    New, Established, New Problem:  Established  Location:  Toenails  Duration:   Greater than five years  Onset:  Gradual  Nature:  sore with palpation.  Stable, worsening, improving:  stable  Aggravating factors:  Pain with shoe gear and ambulation.  Previous Treatment:  debridement    Medical changes: No changes    Patient states their most recent blood glucose reading:  unsure.      Patient denies any fevers, chills, nausea, vomiting, shortness of breath, nor any other constitutional signs nor symptoms.       I have reviewed/confirmed previously documented HPI with no changes.       Past Medical History:   Diagnosis Date    Diabetes mellitus     Elevated WBC count     GERD (gastroesophageal reflux disease)     High cholesterol     Hypertension     Leukocytosis     Sleep apnea      Past Surgical History:   Procedure Laterality Date    CIRCUMCISION N/A 2023    Procedure: Cystoscopy and Circumcision;  Surgeon: Lashonda Vargas MD;  Location: [facility];  Service: Urology;  Laterality: N/A;    PROSTATE BIOPSY      UMBILICAL HERNIA REPAIR       Family History   Problem Relation Age of Onset    Lung cancer Other      Social History     Socioeconomic History    Marital status:     Number of children: 0   Tobacco Use    Smoking status: Former     Current packs/day: 0.00     Average packs/day: 1 pack/day for 10.0 years (10.0 ttl pk-yrs)     Types: Cigarettes     Start date:      Quit date: 2011     Years since quittin.9    Smokeless tobacco: Never    Tobacco comments:      10-25 PACK YEARS   Vaping Use    Vaping status: Never Used   Substance and Sexual Activity    Alcohol use: Not Currently     Comment: occasional    Drug use: Not Currently    Sexual activity: Defer     No Known Allergies  Current Outpatient Medications   Medication Sig Dispense Refill    acetaminophen (TYLENOL) 500 MG tablet Take 1 tablet by mouth Every 6 (Six) Hours As Needed for Mild Pain. 120 tablet 1    amLODIPine-benazepril (LOTREL 5-20) 5-20 MG per capsule Take 1 capsule by mouth Daily. 90 capsule 1    aspirin 81 MG EC tablet Take 1 tablet by mouth Daily.      Blood Pressure Monitoring (Blood Pressure Cuff) misc Use to take blood 1-2 times a day 1 each 0    cetirizine (zyrTEC) 10 MG tablet Take 1 tablet by mouth Daily. 90 tablet 1    Diclofenac Sodium (Voltaren) 1 % gel gel Apply 4 g topically to the appropriate area as directed 4 (Four) Times a Day As Needed (hand pain). 100 g 1    doxazosin (Cardura) 4 MG tablet Take 1 tablet by mouth Daily for 360 days. 90 tablet 2    Dulaglutide (Trulicity) 0.75 MG/0.5ML solution auto-injector Inject 0.75 mg under the skin into the appropriate area as directed 1 (One) Time Per Week. 6 mL 1    emollient cream       finasteride (PROSCAR) 5 MG tablet Take 1 tablet by mouth Daily. 90 tablet 3    fluocinonide (LIDEX) 0.05 % cream Apply 1 Application topically to the appropriate area as directed Every 8 (Eight) Hours.      hydroCHLOROthiazide 12.5 MG tablet Take 1 tablet by mouth Daily. 90 tablet 1    metFORMIN ER (GLUCOPHAGE-XR) 500 MG 24 hr tablet Take 2 tablets by mouth Daily With Breakfast. 180 tablet 1    omeprazole (priLOSEC) 20 MG capsule Take 1 capsule by mouth Daily. 90 capsule 1    oxybutynin XL (DITROPAN-XL) 5 MG 24 hr tablet Take 2 tablets by mouth Daily. 90 tablet 3    rosuvastatin (Crestor) 20 MG tablet Take 1 tablet by mouth Daily. 90 tablet 1    sildenafil (VIAGRA) 100 MG tablet Take 1 tablet by mouth Daily As Needed for Erectile Dysfunction. 20 tablet 2     triamcinolone (KENALOG) 0.5 % cream Apply 1 application  topically to the appropriate area as directed 3 (Three) Times a Day. 45 g 0     No current facility-administered medications for this visit.     Review of Systems   Constitutional: Negative.    Skin:         Painful toenails   Neurological:  Positive for numbness.   All other systems reviewed and are negative.      OBJECTIVE     Vitals:    12/02/24 0824   BP: 163/89   Pulse: 70   Temp: 98 °F (36.7 °C)   SpO2: 92%       Patient seen in no apparent distress.      PHYSICAL EXAM:     Foot/Ankle Exam    GENERAL  Appearance:  obese, elderly and chronically ill  Orientation:  AAOx3  Affect:  appropriate  Gait:  unimpaired  Assistance:  independent  Right shoe gear: casual shoe  Left shoe gear: casual shoe    VASCULAR     Right Foot Vascularity   Dorsalis pedis:  2+  Posterior tibial:  2+  Skin temperature:  warm  Edema grading:  None  CFT:  < 3 seconds  Pedal hair growth:  Absent  Varicosities:  moderate varicosities     Left Foot Vascularity   Dorsalis pedis:  2+  Posterior tibial:  2+  Skin temperature:  warm  Edema grading:  None  CFT:  < 3 seconds  Pedal hair growth:  Present  Varicosities:  moderate varicosities     NEUROLOGIC     Right Foot Neurologic   Light touch sensation: diminished  Vibratory sensation: diminished  Hot/Cold sensation: diminished  Protective Sensation using [place] Monofilament:   Sites intact: 4  Sites tested: 10     Left Foot Neurologic   Light touch sensation: diminished  Vibratory sensation: diminished  Hot/Cold sensation:  diminished  Protective Sensation using [place] Monofilament:   Sites intact: 4  Sites tested: 10    MUSCLE STRENGTH     Right Foot Muscle Strength   Foot dorsiflexion:  4  Foot plantar flexion:  4  Foot inversion:  4  Foot eversion:  4     Left Foot Muscle Strength   Foot dorsiflexion:  4  Foot plantar flexion:  4  Foot inversion:  4  Foot eversion:  4    RANGE OF MOTION     Right Foot Range of  Motion   Foot and ankle ROM within normal limits       Left Foot Range of Motion   Foot and ankle ROM within normal limits      DERMATOLOGIC      Right Foot Dermatologic   Skin  Right foot skin is intact.   Nails  2.  Positive for elongated, onychomycosis, abnormal thickness, subungual debris and ingrown toenail.  3.  Positive for elongated, onychomycosis, abnormal thickness, subungual debris and ingrown toenail.  4.  Positive for elongated, onychomycosis, abnormal thickness, subungual debris and ingrown toenail.  5.  Positive for elongated, onychomycosis, abnormal thickness, subungual debris and ingrown toenail.     Left Foot Dermatologic   Skin  Left foot skin is intact.   Nails  2.  Positive for elongated, onychomycosis, abnormal thickness, subungual debris and ingrown toenail.  3.  Positive for elongated, onychomycosis, abnormal thickness, subungual debris and ingrown toenail.  4.  Positive for elongated, onychomycosis, abnormally thick, subungual debris and ingrown toenail.  5.  Positive for elongated, onychomycosis, abnormally thick, subungual debris and ingrown toenail.    I have reexamined the patient the results are consistent with the previously documented exam.    ASSESSMENT/PLAN     Diagnoses and all orders for this visit:    1. Neuropathy (Primary)    2. Onychomycosis    3. Onychocryptosis    4. Foot pain, bilateral    5. Diabetes mellitus without complication    6. DM feet    Comprehensive lower extremity examination and evaluation was performed.    Discussed findings and treatment plan including risks, benefits, and treatment options with patient in detail. Patient agreed with treatment plan.    Toenails 2, 3, 4, 5 on Right and 2, 3, 4, 5 on Left were debrided with nail nippers then filed with a Andres nail brinda.  Patient tolerated procedure well without complications.    Patient is to monitor for recurrence and any new symptoms and to contact Dr. Javed's office for a follow-up appointment.       The patient states understanding and agreement with this plan.    An After Visit Summary was printed and given to the patient at discharge, including (if requested) any available informative/educational handouts regarding diagnosis, treatment, or medications. All questions were answered to patient/family satisfaction. Should symptoms fail to improve or worsen they agree to call or return to clinic or to go to the Emergency Department. Discussed the importance of following up with any needed screening tests/labs/specialist appointments and any requested follow-up recommended by me today. Importance of maintaining follow-up discussed and patient accepts that missed appointments can delay diagnosis and potentially lead to worsening of conditions.    Return in about 9 weeks (around 2/3/2025) for Toenail Care., or sooner if acute issues arise.    I have reviewed the assessment and plan and verified the accuracy of it. No changes to assessment and plan since the information was documented. Jonathan Javed DPM 12/02/24     I have dictated this note utilizing Dragon Dictation.  Please note that portions of this note were completed with a voice recognition program.  Part of this note may be an electronic transcription/translation of spoken language to printed text using the Dragon Dictation System.      This document has been electronically signed by Jonathan Javed DPM on December 2, 2024 08:33 EST

## 2025-01-13 RX ORDER — AMLODIPINE AND BENAZEPRIL HYDROCHLORIDE 5; 20 MG/1; MG/1
1 CAPSULE ORAL DAILY
Qty: 90 CAPSULE | Refills: 1 | Status: SHIPPED | OUTPATIENT
Start: 2025-01-13

## 2025-01-13 RX ORDER — METFORMIN HYDROCHLORIDE 500 MG/1
1000 TABLET, EXTENDED RELEASE ORAL
Qty: 180 TABLET | Refills: 1 | Status: SHIPPED | OUTPATIENT
Start: 2025-01-13

## 2025-01-13 NOTE — TELEPHONE ENCOUNTER
Pt needs refills on amLODIPine-benazepril (LOTREL 5-20) 5-20 MG per capsule and      metFORMIN ER (GLUCOPHAGE-XR) 500 MG 24 hr tablet  1,000 mg, Daily With Breakfast                Pt would like a call when placed

## 2025-01-31 ENCOUNTER — LAB (OUTPATIENT)
Dept: LAB | Facility: HOSPITAL | Age: 75
End: 2025-01-31
Payer: MEDICARE

## 2025-01-31 DIAGNOSIS — D47.Z9 LOW GRADE B CELL LYMPHOPROLIFERATIVE DISORDER: ICD-10-CM

## 2025-01-31 LAB
ALBUMIN SERPL-MCNC: 4.2 G/DL (ref 3.5–5.2)
ALBUMIN/GLOB SERPL: 1.6 G/DL
ALP SERPL-CCNC: 141 U/L (ref 39–117)
ALT SERPL W P-5'-P-CCNC: 12 U/L (ref 1–41)
ANION GAP SERPL CALCULATED.3IONS-SCNC: 8.4 MMOL/L (ref 5–15)
AST SERPL-CCNC: 24 U/L (ref 1–40)
BASOPHILS # BLD MANUAL: 0.27 10*3/MM3 (ref 0–0.2)
BASOPHILS NFR BLD MANUAL: 1 % (ref 0–1.5)
BILIRUB SERPL-MCNC: 0.7 MG/DL (ref 0–1.2)
BUN SERPL-MCNC: 24 MG/DL (ref 8–23)
BUN/CREAT SERPL: 22 (ref 7–25)
CALCIUM SPEC-SCNC: 9.1 MG/DL (ref 8.6–10.5)
CHLORIDE SERPL-SCNC: 103 MMOL/L (ref 98–107)
CO2 SERPL-SCNC: 28.6 MMOL/L (ref 22–29)
CREAT SERPL-MCNC: 1.09 MG/DL (ref 0.76–1.27)
DEPRECATED RDW RBC AUTO: 40.3 FL (ref 37–54)
EGFRCR SERPLBLD CKD-EPI 2021: 72.1 ML/MIN/1.73
EOSINOPHIL # BLD MANUAL: 0.55 10*3/MM3 (ref 0–0.4)
EOSINOPHIL NFR BLD MANUAL: 2 % (ref 0.3–6.2)
ERYTHROCYTE [DISTWIDTH] IN BLOOD BY AUTOMATED COUNT: 12.5 % (ref 12.3–15.4)
GLOBULIN UR ELPH-MCNC: 2.7 GM/DL
GLUCOSE SERPL-MCNC: 106 MG/DL (ref 65–99)
HCT VFR BLD AUTO: 40.8 % (ref 37.5–51)
HGB BLD-MCNC: 13.2 G/DL (ref 13–17.7)
LDH SERPL-CCNC: 166 U/L (ref 135–225)
LYMPHOCYTES # BLD MANUAL: 19.17 10*3/MM3 (ref 0.7–3.1)
LYMPHOCYTES NFR BLD MANUAL: 4 % (ref 5–12)
MCH RBC QN AUTO: 28.4 PG (ref 26.6–33)
MCHC RBC AUTO-ENTMCNC: 32.4 G/DL (ref 31.5–35.7)
MCV RBC AUTO: 87.9 FL (ref 79–97)
MONOCYTES # BLD: 1.1 10*3/MM3 (ref 0.1–0.9)
NEUTROPHILS # BLD AUTO: 6.3 10*3/MM3 (ref 1.7–7)
NEUTROPHILS NFR BLD MANUAL: 23 % (ref 42.7–76)
PLAT MORPH BLD: NORMAL
PLATELET # BLD AUTO: 212 10*3/MM3 (ref 140–450)
PMV BLD AUTO: 10.4 FL (ref 6–12)
POTASSIUM SERPL-SCNC: 4.4 MMOL/L (ref 3.5–5.2)
PROT SERPL-MCNC: 6.9 G/DL (ref 6–8.5)
RBC # BLD AUTO: 4.64 10*6/MM3 (ref 4.14–5.8)
RBC MORPH BLD: NORMAL
SODIUM SERPL-SCNC: 140 MMOL/L (ref 136–145)
VARIANT LYMPHS NFR BLD MANUAL: 23 % (ref 0–5)
VARIANT LYMPHS NFR BLD MANUAL: 47 % (ref 19.6–45.3)
WBC MORPH BLD: NORMAL
WBC NRBC COR # BLD AUTO: 27.39 10*3/MM3 (ref 3.4–10.8)

## 2025-01-31 PROCEDURE — 36415 COLL VENOUS BLD VENIPUNCTURE: CPT

## 2025-01-31 PROCEDURE — 85025 COMPLETE CBC W/AUTO DIFF WBC: CPT

## 2025-01-31 PROCEDURE — 83615 LACTATE (LD) (LDH) ENZYME: CPT

## 2025-01-31 PROCEDURE — 85007 BL SMEAR W/DIFF WBC COUNT: CPT

## 2025-01-31 PROCEDURE — 80053 COMPREHEN METABOLIC PANEL: CPT

## 2025-02-05 ENCOUNTER — OFFICE VISIT (OUTPATIENT)
Dept: ONCOLOGY | Facility: HOSPITAL | Age: 75
End: 2025-02-05
Payer: MEDICARE

## 2025-02-05 VITALS
RESPIRATION RATE: 18 BRPM | HEIGHT: 70 IN | SYSTOLIC BLOOD PRESSURE: 168 MMHG | WEIGHT: 285 LBS | HEART RATE: 70 BPM | OXYGEN SATURATION: 95 % | DIASTOLIC BLOOD PRESSURE: 79 MMHG | TEMPERATURE: 97.2 F | BODY MASS INDEX: 40.8 KG/M2

## 2025-02-05 DIAGNOSIS — D47.Z9 LOW GRADE B CELL LYMPHOPROLIFERATIVE DISORDER: Primary | ICD-10-CM

## 2025-02-05 PROCEDURE — 3077F SYST BP >= 140 MM HG: CPT | Performed by: INTERNAL MEDICINE

## 2025-02-05 PROCEDURE — G0463 HOSPITAL OUTPT CLINIC VISIT: HCPCS | Performed by: INTERNAL MEDICINE

## 2025-02-05 PROCEDURE — 3078F DIAST BP <80 MM HG: CPT | Performed by: INTERNAL MEDICINE

## 2025-02-05 PROCEDURE — 1126F AMNT PAIN NOTED NONE PRSNT: CPT | Performed by: INTERNAL MEDICINE

## 2025-02-05 PROCEDURE — 99214 OFFICE O/P EST MOD 30 MIN: CPT | Performed by: INTERNAL MEDICINE

## 2025-02-05 NOTE — PROGRESS NOTES
Patient  Quique Valdez    [facility] HEMATOLOGY & ONCOLOGY    Chief Complaint  Leukocytosis, unspecified type    Referring Provider: YEFRI Yung  PCP: Charmaine Pate APRN    Subjective          Oncology/Hematology History Overview Note     CD 5+ B-cell lymphoproliferative disorder:  - elevated lymphocyte count noted in January 2020.  - 3/24/2020: Flow cytometry shows CD5 positive B-cell lymphoproliferative  disorder.  There is a monoclonal B-cell population with kappa light chain     restriction.  The phenotype is not typical for CLL/SLL or mantle cell lymphoma. Cytogenetic studies were normal.  Normal CLL FISH.             HPI  Patient comes in today for follow-up.  He  again denies any fever, recent infections, night sweats, unintentional weight loss, or lymphadenopathy.      Review of Systems   Constitutional:  Positive for fatigue (5/10). Negative for appetite change, diaphoresis, fever, unexpected weight gain and unexpected weight loss.   HENT:  Negative for hearing loss, mouth sores, sore throat, swollen glands, trouble swallowing and voice change.    Eyes:  Negative for blurred vision.   Respiratory:  Negative for cough, shortness of breath and wheezing.    Cardiovascular:  Negative for chest pain and palpitations.   Gastrointestinal:  Negative for abdominal pain, blood in stool, constipation, diarrhea, nausea and vomiting.   Endocrine: Negative for cold intolerance and heat intolerance.   Genitourinary:  Negative for difficulty urinating, dysuria, frequency, hematuria and urinary incontinence.   Musculoskeletal:  Positive for arthralgias (Lt knee 5/10). Negative for back pain and myalgias.   Skin:  Negative for rash, skin lesions and wound.   Neurological:  Negative for dizziness, seizures, weakness, numbness and headache.   Hematological:  Does not bruise/bleed easily.   Psychiatric/Behavioral:  Negative for depressed mood. The patient is not nervous/anxious.    All  other systems reviewed and are negative.      Past Medical History:   Diagnosis Date    Diabetes mellitus     Elevated WBC count     GERD (gastroesophageal reflux disease)     High cholesterol     Hypertension     Leukocytosis     Sleep apnea      Past Surgical History:   Procedure Laterality Date    CIRCUMCISION N/A 2023    Procedure: Cystoscopy and Circumcision;  Surgeon: Lashonda Vargas MD;  Location: [facility] OR;  Service: Urology;  Laterality: N/A;    PROSTATE BIOPSY      UMBILICAL HERNIA REPAIR       Social History     Socioeconomic History    Marital status:     Number of children: 0   Tobacco Use    Smoking status: Former     Current packs/day: 0.00     Average packs/day: 1 pack/day for 10.0 years (10.0 ttl pk-yrs)     Types: Cigarettes     Start date:      Quit date:      Years since quittin.1    Smokeless tobacco: Never    Tobacco comments:     10-25 PACK YEARS   Vaping Use    Vaping status: Never Used   Substance and Sexual Activity    Alcohol use: Not Currently     Comment: occasional    Drug use: Not Currently    Sexual activity: Defer     Family History   Problem Relation Age of Onset    Lung cancer Other        Objective   Physical Exam  Vitals reviewed. Exam conducted with a chaperone present.   Constitutional:       General: He is not in acute distress.     Appearance: Normal appearance.   HENT:      Head: Normocephalic and atraumatic.   Eyes:      Extraocular Movements: Extraocular movements intact.      Conjunctiva/sclera: Conjunctivae normal.   Neck:      Comments: No bilateral cervical, supraclavicular, or axillary lymphadenopathy palpated.  Cardiovascular:      Pulses: Normal pulses.      Heart sounds: Normal heart sounds.   Pulmonary:      Effort: Pulmonary effort is normal.      Breath sounds: Normal breath sounds. No rhonchi or rales.   Abdominal:      General: Bowel sounds are normal. There is no distension.      Palpations: Abdomen is soft. There is no mass.  "     Tenderness: There is no abdominal tenderness.   Musculoskeletal:      Cervical back: Normal range of motion and neck supple.   Lymphadenopathy:      Cervical: No cervical adenopathy.   Skin:     General: Skin is warm and dry.   Neurological:      Mental Status: He is oriented to person, place, and time.           Vitals:    02/05/25 0825   BP: 168/79   Pulse: 70   Resp: 18   Temp: 97.2 °F (36.2 °C)   TempSrc: Temporal   SpO2: 95%   Weight: 129 kg (285 lb)   Height: 177.8 cm (70\")   PainSc: 0-No pain       ECOG score: 0         PHQ-9 Total Score:         Result Review :   The following data was reviewed by: Heber Chanel MD on 04/12/2023:  Lab Results   Component Value Date    HGB 13.2 01/31/2025    HCT 40.8 01/31/2025    MCV 87.9 01/31/2025     01/31/2025    WBC 27.39 (H) 01/31/2025    NEUTROABS 6.30 01/31/2025    LYMPHSABS 12.68 (H) 09/23/2022    MONOSABS 0.96 (H) 09/23/2022    EOSABS 0.55 (H) 01/31/2025    BASOSABS 0.27 (H) 01/31/2025     Lab Results   Component Value Date    GLUCOSE 106 (H) 01/31/2025    BUN 24 (H) 01/31/2025    CREATININE 1.09 01/31/2025     01/31/2025    K 4.4 01/31/2025     01/31/2025    CO2 28.6 01/31/2025    CALCIUM 9.1 01/31/2025    PROTEINTOT 6.9 01/31/2025    ALBUMIN 4.2 01/31/2025    BILITOT 0.7 01/31/2025    ALKPHOS 141 (H) 01/31/2025    AST 24 01/31/2025    ALT 12 01/31/2025          Assessment and Plan    Diagnoses and all orders for this visit:    1. 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 He will f/u with me in 6 months with repeat CBC, CMP, and LDH.  Pt counseled on concerning symptoms that would warrant follow " up in my clinic sooner than his next clinic appointment.       Please note that portions of this note were completed with a voice recognition program.    I spent 30 minutes caring for Quique today which included review of medical record, reviewing lab work, discussing lab results, performing physical exam, medical documentation, and care coordination.      Patient was given instructions and counseling regarding his condition or for health maintenance advice. Please see specific information pulled into the AVS if appropriate.     Electronically signed by Heber Chanel MD, 02/05/25, 9:13 AM EST.

## 2025-02-24 ENCOUNTER — OFFICE VISIT (OUTPATIENT)
Dept: PODIATRY | Facility: CLINIC | Age: 75
End: 2025-02-24
Payer: MEDICARE

## 2025-02-24 VITALS
OXYGEN SATURATION: 95 % | TEMPERATURE: 97.7 F | HEART RATE: 89 BPM | HEIGHT: 70 IN | WEIGHT: 285 LBS | SYSTOLIC BLOOD PRESSURE: 144 MMHG | BODY MASS INDEX: 40.8 KG/M2 | DIASTOLIC BLOOD PRESSURE: 67 MMHG

## 2025-02-24 DIAGNOSIS — E11.9 DIABETES MELLITUS WITHOUT COMPLICATION: ICD-10-CM

## 2025-02-24 DIAGNOSIS — M79.672 FOOT PAIN, BILATERAL: ICD-10-CM

## 2025-02-24 DIAGNOSIS — G62.9 NEUROPATHY: Primary | ICD-10-CM

## 2025-02-24 DIAGNOSIS — L60.0 ONYCHOCRYPTOSIS: ICD-10-CM

## 2025-02-24 DIAGNOSIS — M79.671 FOOT PAIN, BILATERAL: ICD-10-CM

## 2025-02-24 DIAGNOSIS — E11.8 DM FEET: ICD-10-CM

## 2025-02-24 DIAGNOSIS — B35.1 ONYCHOMYCOSIS: ICD-10-CM

## 2025-02-24 PROCEDURE — 1160F RVW MEDS BY RX/DR IN RCRD: CPT | Performed by: PODIATRIST

## 2025-02-24 PROCEDURE — 3078F DIAST BP <80 MM HG: CPT | Performed by: PODIATRIST

## 2025-02-24 PROCEDURE — 11721 DEBRIDE NAIL 6 OR MORE: CPT | Performed by: PODIATRIST

## 2025-02-24 PROCEDURE — 1159F MED LIST DOCD IN RCRD: CPT | Performed by: PODIATRIST

## 2025-02-24 PROCEDURE — 3077F SYST BP >= 140 MM HG: CPT | Performed by: PODIATRIST

## 2025-02-24 RX ORDER — SODIUM FLUORIDE 5 MG/ML
PASTE, DENTIFRICE DENTAL
COMMUNITY
Start: 2024-10-30

## 2025-02-24 NOTE — PROGRESS NOTES
[facility]    Today's Date: 25    Patient Name: Quique Valdez  MRN: [id number]  CSN: [id number]  PCP: Charmaine Pate APRN, Last PCP Visit: 2024  Referring Provider: No ref. provider found    SUBJECTIVE     Chief Complaint   Patient presents with    Left Foot - Follow-up, Nail Problem    Right Foot - Follow-up, Nail Problem     HPI: Quique Valdez, a 74 y.o.male, comes to clinic.    New, Established, New Problem:  Established  Location:  Toenails  Duration:   Greater than five years  Onset:  Gradual  Nature:  sore with palpation.  Stable, worsening, improving:  stable  Aggravating factors:  Pain with shoe gear and ambulation.  Previous Treatment:  debridement    Medical changes: None    Patient states they are unsure of the most recent blood glucose reading.    Patient denies any fevers, chills, nausea, vomiting, shortness of breath, nor any other constitutional signs nor symptoms.       I have reviewed/confirmed previously documented HPI with no changes.       Past Medical History:   Diagnosis Date    Diabetes mellitus     Elevated WBC count     GERD (gastroesophageal reflux disease)     High cholesterol     Hypertension     Leukocytosis     Sleep apnea      Past Surgical History:   Procedure Laterality Date    CIRCUMCISION N/A 2023    Procedure: Cystoscopy and Circumcision;  Surgeon: Lashonda Vargas MD;  Location: [facility] OR;  Service: Urology;  Laterality: N/A;    PROSTATE BIOPSY      UMBILICAL HERNIA REPAIR       Family History   Problem Relation Age of Onset    Lung cancer Other      Social History     Socioeconomic History    Marital status:     Number of children: 0   Tobacco Use    Smoking status: Former     Current packs/day: 0.00     Average packs/day: 1 pack/day for 10.0 years (10.0 ttl pk-yrs)     Types: Cigarettes     Start date:      Quit date: 2011     Years since quittin.1    Smokeless tobacco: Never    Tobacco comments:      10-25 PACK YEARS   Vaping Use    Vaping status: Never Used   Substance and Sexual Activity    Alcohol use: Not Currently     Comment: occasional    Drug use: Not Currently    Sexual activity: Defer     No Known Allergies  Current Outpatient Medications   Medication Sig Dispense Refill    acetaminophen (TYLENOL) 500 MG tablet Take 1 tablet by mouth Every 6 (Six) Hours As Needed for Mild Pain. 120 tablet 1    amLODIPine-benazepril (LOTREL 5-20) 5-20 MG per capsule Take 1 capsule by mouth Daily. 90 capsule 1    aspirin 81 MG EC tablet Take 1 tablet by mouth Daily.      Blood Pressure Monitoring (Blood Pressure Cuff) misc Use to take blood 1-2 times a day 1 each 0    cetirizine (zyrTEC) 10 MG tablet Take 1 tablet by mouth Daily. 90 tablet 1    Diclofenac Sodium (Voltaren) 1 % gel gel Apply 4 g topically to the appropriate area as directed 4 (Four) Times a Day As Needed (hand pain). 100 g 1    doxazosin (Cardura) 4 MG tablet Take 1 tablet by mouth Daily for 360 days. 90 tablet 2    Dulaglutide (Trulicity) 0.75 MG/0.5ML solution auto-injector Inject 0.75 mg under the skin into the appropriate area as directed 1 (One) Time Per Week. 6 mL 1    emollient cream       finasteride (PROSCAR) 5 MG tablet Take 1 tablet by mouth Daily. 90 tablet 3    fluocinonide (LIDEX) 0.05 % cream Apply 1 Application topically to the appropriate area as directed Every 8 (Eight) Hours.      hydroCHLOROthiazide 12.5 MG tablet Take 1 tablet by mouth Daily. 90 tablet 1    metFORMIN ER (GLUCOPHAGE-XR) 500 MG 24 hr tablet Take 2 tablets by mouth Daily With Breakfast. 180 tablet 1    omeprazole (priLOSEC) 20 MG capsule Take 1 capsule by mouth Daily. 90 capsule 1    oxybutynin XL (DITROPAN-XL) 5 MG 24 hr tablet Take 2 tablets by mouth Daily. 90 tablet 3    rosuvastatin (Crestor) 20 MG tablet Take 1 tablet by mouth Daily. 90 tablet 1    sildenafil (VIAGRA) 100 MG tablet Take 1 tablet by mouth Daily As Needed for Erectile Dysfunction. 20 tablet 2     Sodium Fluoride 1.1 % cream       triamcinolone (KENALOG) 0.5 % cream Apply 1 application  topically to the appropriate area as directed 3 (Three) Times a Day. 45 g 0     No current facility-administered medications for this visit.     Review of Systems   Constitutional: Negative.    Skin:         Painful toenails   Neurological:  Positive for numbness.   All other systems reviewed and are negative.      OBJECTIVE     Vitals:    02/24/25 0835   BP: 144/67   Pulse: 89   Temp: 97.7 °F (36.5 °C)   SpO2: 95%       Patient seen in no apparent distress.      PHYSICAL EXAM:     Foot/Ankle Exam    GENERAL  Appearance:  obese, elderly and chronically ill  Orientation:  AAOx3  Affect:  appropriate  Gait:  unimpaired  Assistance:  independent  Right shoe gear: casual shoe  Left shoe gear: casual shoe    VASCULAR     Right Foot Vascularity   Dorsalis pedis:  2+  Posterior tibial:  2+  Skin temperature:  warm  Edema grading:  None  CFT:  < 3 seconds  Pedal hair growth:  Absent  Varicosities:  moderate varicosities     Left Foot Vascularity   Dorsalis pedis:  2+  Posterior tibial:  2+  Skin temperature:  warm  Edema grading:  None  CFT:  < 3 seconds  Pedal hair growth:  Present  Varicosities:  moderate varicosities     NEUROLOGIC     Right Foot Neurologic   Light touch sensation: diminished  Vibratory sensation: diminished  Hot/Cold sensation: diminished  Protective Sensation using [place] Monofilament:   Sites intact: 4  Sites tested: 10     Left Foot Neurologic   Light touch sensation: diminished  Vibratory sensation: diminished  Hot/Cold sensation:  diminished  Protective Sensation using [place] Monofilament:   Sites intact: 4  Sites tested: 10    MUSCLE STRENGTH     Right Foot Muscle Strength   Foot dorsiflexion:  4  Foot plantar flexion:  4  Foot inversion:  4  Foot eversion:  4     Left Foot Muscle Strength   Foot dorsiflexion:  4  Foot plantar flexion:  4  Foot inversion:  4  Foot eversion:  4    RANGE OF  MOTION     Right Foot Range of Motion   Foot and ankle ROM within normal limits       Left Foot Range of Motion   Foot and ankle ROM within normal limits      DERMATOLOGIC      Right Foot Dermatologic   Skin  Right foot skin is intact.   Nails  2.  Positive for elongated, onychomycosis, abnormal thickness, subungual debris and ingrown toenail.  3.  Positive for elongated, onychomycosis, abnormal thickness, subungual debris and ingrown toenail.  4.  Positive for elongated, onychomycosis, abnormal thickness, subungual debris and ingrown toenail.  5.  Positive for elongated, onychomycosis, abnormal thickness, subungual debris and ingrown toenail.     Left Foot Dermatologic   Skin  Left foot skin is intact.   Nails  2.  Positive for elongated, onychomycosis, abnormal thickness, subungual debris and ingrown toenail.  3.  Positive for elongated, onychomycosis, abnormal thickness, subungual debris and ingrown toenail.  4.  Positive for elongated, onychomycosis, abnormally thick, subungual debris and ingrown toenail.  5.  Positive for elongated, onychomycosis, abnormally thick, subungual debris and ingrown toenail.    I have reexamined the patient the results are consistent with the previously documented exam.    ASSESSMENT/PLAN     Diagnoses and all orders for this visit:    1. Neuropathy (Primary)    2. Onychomycosis    3. Onychocryptosis    4. Foot pain, bilateral    5. Diabetes mellitus without complication    6. DM feet    Comprehensive lower extremity examination and evaluation was performed.    Discussed findings and treatment plan including risks, benefits, and treatment options with patient in detail. Patient agreed with treatment plan.    Toenails 2, 3, 4, 5 on Right and 2, 3, 4, 5 on Left were debrided with nail nippers then filed with a Dereckmel nail brinda.  Patient tolerated procedure well without complications.    Patient is to monitor for recurrence and any new symptoms and to contact Dr. Javed's office for  a follow-up appointment.      The patient states understanding and agreement with this plan.    An After Visit Summary was printed and given to the patient at discharge, including (if requested) any available informative/educational handouts regarding diagnosis, treatment, or medications. All questions were answered to patient/family satisfaction. Should symptoms fail to improve or worsen they agree to call or return to clinic or to go to the Emergency Department. Discussed the importance of following up with any needed screening tests/labs/specialist appointments and any requested follow-up recommended by me today. Importance of maintaining follow-up discussed and patient accepts that missed appointments can delay diagnosis and potentially lead to worsening of conditions.    Return in about 9 weeks (around 4/28/2025) for Toenail Care., or sooner if acute issues arise.    I have reviewed the assessment and plan and verified the accuracy of it. No changes to assessment and plan since the information was documented. Jonathan Javed DPM 02/24/25     I have dictated this note utilizing Dragon Dictation.  Please note that portions of this note were completed with a voice recognition program.  Part of this note may be an electronic transcription/translation of spoken language to printed text using the Dragon Dictation System.      This document has been electronically signed by Jonathan Javed DPM on February 24, 2025 08:51 EST

## 2025-03-03 ENCOUNTER — OFFICE VISIT (OUTPATIENT)
Dept: CARDIOLOGY | Facility: CLINIC | Age: 75
End: 2025-03-03
Payer: MEDICARE

## 2025-03-03 VITALS
DIASTOLIC BLOOD PRESSURE: 88 MMHG | SYSTOLIC BLOOD PRESSURE: 161 MMHG | HEART RATE: 82 BPM | HEIGHT: 70 IN | BODY MASS INDEX: 40.09 KG/M2 | WEIGHT: 280 LBS

## 2025-03-03 DIAGNOSIS — I10 PRIMARY HYPERTENSION: Primary | Chronic | ICD-10-CM

## 2025-03-03 DIAGNOSIS — E78.2 MIXED HYPERLIPIDEMIA: ICD-10-CM

## 2025-03-03 DIAGNOSIS — R07.9 CHEST PAIN, UNSPECIFIED TYPE: ICD-10-CM

## 2025-03-03 PROCEDURE — 1159F MED LIST DOCD IN RCRD: CPT | Performed by: INTERNAL MEDICINE

## 2025-03-03 PROCEDURE — 1160F RVW MEDS BY RX/DR IN RCRD: CPT | Performed by: INTERNAL MEDICINE

## 2025-03-03 PROCEDURE — 3079F DIAST BP 80-89 MM HG: CPT | Performed by: INTERNAL MEDICINE

## 2025-03-03 PROCEDURE — 3077F SYST BP >= 140 MM HG: CPT | Performed by: INTERNAL MEDICINE

## 2025-03-03 PROCEDURE — 99214 OFFICE O/P EST MOD 30 MIN: CPT | Performed by: INTERNAL MEDICINE

## 2025-03-03 NOTE — ASSESSMENT & PLAN NOTE
He has not had any episodes of chest pain or left arm pain recently.  SPECT stress test done in late 2023 was negative for reversible ischemia.  Encouraged to increase activity and regular exercise.  Continue aspirin.  Continue statins.

## 2025-03-03 NOTE — PROGRESS NOTES
"  CARDIOLOGY FOLLOW-UP PROGRESS NOTE        Chief Complaint  Follow-up, Hypertension, and Chest Pain    Subjective            Quique Valdez presents to [facility] CARDIOLOGY  History of Present Illness      Mr Valdez is here for a routine annual follow-up visit.  He was previously seen evaluated 2023 for left arm pain/numbness along with mild chest discomfort.  He also reported exertional shortness of breath.  Cardiac workup including echocardiogram and SPECT stress was unremarkable.  Today he reports feeling fine.  He has not had any chest pain or left arm pain for a while.  1 month back he had episode of chest pain on taking deep breaths, mostly with a \"chest cold\".  He works third shift and came to the office today from work.  He has not taken his antihypertensive medications this morning.      Past History:    Medical History:  Past Medical History:   Diagnosis Date    Diabetes mellitus     Elevated WBC count     GERD (gastroesophageal reflux disease)     High cholesterol     Hypertension     Leukocytosis     Sleep apnea        Family History: family history includes Lung cancer in an other family member.     Social History: reports that he quit smoking about 14 years ago. His smoking use included cigarettes. He started smoking about 24 years ago. He has a 10 pack-year smoking history. He has never used smokeless tobacco. He reports that he does not currently use alcohol. He reports that he does not currently use drugs.    Allergies: Patient has no known allergies.    Current Outpatient Medications on File Prior to Visit   Medication Sig    acetaminophen (TYLENOL) 500 MG tablet Take 1 tablet by mouth Every 6 (Six) Hours As Needed for Mild Pain.    amLODIPine-benazepril (LOTREL 5-20) 5-20 MG per capsule Take 1 capsule by mouth Daily.    aspirin 81 MG EC tablet Take 1 tablet by mouth Daily.    Blood Pressure Monitoring (Blood Pressure Cuff) misc Use to take blood 1-2 times a day    " "cetirizine (zyrTEC) 10 MG tablet Take 1 tablet by mouth Daily.    Diclofenac Sodium (Voltaren) 1 % gel gel Apply 4 g topically to the appropriate area as directed 4 (Four) Times a Day As Needed (hand pain).    doxazosin (Cardura) 4 MG tablet Take 1 tablet by mouth Daily for 360 days.    Dulaglutide (Trulicity) 0.75 MG/0.5ML solution auto-injector Inject 0.75 mg under the skin into the appropriate area as directed 1 (One) Time Per Week.    emollient cream     finasteride (PROSCAR) 5 MG tablet Take 1 tablet by mouth Daily.    fluocinonide (LIDEX) 0.05 % cream Apply 1 Application topically to the appropriate area as directed Every 8 (Eight) Hours.    hydroCHLOROthiazide 12.5 MG tablet Take 1 tablet by mouth Daily.    metFORMIN ER (GLUCOPHAGE-XR) 500 MG 24 hr tablet Take 2 tablets by mouth Daily With Breakfast.    omeprazole (priLOSEC) 20 MG capsule Take 1 capsule by mouth Daily.    oxybutynin XL (DITROPAN-XL) 5 MG 24 hr tablet Take 2 tablets by mouth Daily.    rosuvastatin (Crestor) 20 MG tablet Take 1 tablet by mouth Daily.    sildenafil (VIAGRA) 100 MG tablet Take 1 tablet by mouth Daily As Needed for Erectile Dysfunction.    Sodium Fluoride 1.1 % cream     triamcinolone (KENALOG) 0.5 % cream Apply 1 application  topically to the appropriate area as directed 3 (Three) Times a Day.     No current facility-administered medications on file prior to visit.          Review of Systems   Respiratory:  Positive for shortness of breath. Negative for cough and wheezing.    Cardiovascular:  Negative for chest pain, palpitations and leg swelling.   Gastrointestinal:  Negative for nausea and vomiting.   Neurological:  Negative for dizziness and syncope.        Objective     /88   Pulse 82   Ht 177.8 cm (70\")   Wt 127 kg (280 lb)   BMI 40.18 kg/m²       Physical Exam    General : Alert, awake, no acute distress  Neck : Supple, no carotid bruit, no jugular venous distention  CVS : Regular rate and rhythm, no murmur, rubs " or gallops  Lungs: Clear to auscultation bilaterally, no crackles or rhonchi  Abdomen: Soft, nontender, bowel sounds heard in all 4 quadrants  Extremities: Warm, well-perfused, no pedal edema    Result Review :     The following data was reviewed by: Miguel Chinchilla MD on 03/03/2025:    CMP          6/14/2024    11:07 11/4/2024    11:52 1/31/2025    15:33   CMP   Glucose 100  101  106    BUN 16  24  24    Creatinine 1.07  1.29  1.09    EGFR 74.2  59.3  72.1    Sodium 143  140  140    Potassium 4.1  4.9  4.4    Chloride 108  103  103    Calcium 9.1  9.4  9.1    Total Protein 6.3  6.7  6.9    Albumin 4.2  4.4  4.2    Globulin 2.1  2.3  2.7    Total Bilirubin 0.7  0.4  0.7    Alkaline Phosphatase 125  152  141    AST (SGOT) 13  19  24    ALT (SGPT) 12  16  12    Albumin/Globulin Ratio 2.0  1.9  1.6    BUN/Creatinine Ratio 15.0  18.6  22.0    Anion Gap 9.1  11.3  8.4      CBC          6/14/2024    11:07 11/4/2024    11:52 1/31/2025    15:33   CBC   WBC 21.86  27.04  27.39    RBC 4.72  4.63  4.64    Hemoglobin 13.6  13.2  13.2    Hematocrit 41.0  40.9  40.8    MCV 86.9  88.3  87.9    MCH 28.8  28.5  28.4    MCHC 33.2  32.3  32.4    RDW 12.7  12.1  12.5    Platelets 184  219  212      TSH          3/18/2024    12:28 6/14/2024    11:07 11/4/2024    11:52   TSH   TSH 1.460  0.806  1.320      Lipid Panel          3/18/2024    12:28 6/14/2024    11:07 11/4/2024    11:52   Lipid Panel   Total Cholesterol 140  121  142    Triglycerides 112  51  105    HDL Cholesterol 53  49  55    VLDL Cholesterol 20  12  19    LDL Cholesterol  67  60  68    LDL/HDL Ratio 1.22  1.26  1.20           Data reviewed: Cardiology studies        Results for orders placed in visit on 09/21/23    Adult Transthoracic Echo Complete W/ Cont if Necessary Per Protocol    Interpretation Summary    Left ventricular systolic function is normal. Left ventricular ejection fraction appears to be 56 - 60%.    Left ventricular diastolic function is consistent with  (grade I) impaired relaxation.    There is mild tricuspid regurgitation.  Estimated right ventricular systolic pressure from tricuspid regurgitation is normal (<35 mmHg).      Results for orders placed during the hospital encounter of 12/29/23    Stress Test With Myocardial Perfusion One Day    Interpretation Summary    Left ventricular ejection fraction is borderline normal (Calculated EF = 49%).    Patient walked 7 minutes and 31 seconds on modified Warner protocol achieving 4.53 metabolic equivalents.  He did reach target heart rate.  No chest pain was noted.    Stress electrocardiogram showed wandering baseline but no obvious ischemia.  Multiple PVCs were noted.    Myocardial perfusion images shows a fixed small apical defect.  No reversible perfusion defects were noted.    Feel this represents a normal treadmill Cardiolite with no evidence of ischemia.               Assessment and Plan        Diagnoses and all orders for this visit:    1. Primary hypertension (Primary)  Assessment & Plan:  Blood pressure significantly elevated in the office today.  However he has not taken her morning medicines as of now and came for the appointment after doing a night shift.  We discussed about the options.  Low-sodium diet.  Continue amlodipine, benazepril and hydrochlorothiazide at the current dose.  Start doing home blood pressure log, he got a new blood pressure machine.  If systolic blood pressure persistently above 145, amlodipine dose may be increased.      2. Chest pain, unspecified type  Assessment & Plan:  He has not had any episodes of chest pain or left arm pain recently.  SPECT stress test done in late 2023 was negative for reversible ischemia.  Encouraged to increase activity and regular exercise.  Continue aspirin.  Continue statins.      3. Mixed hyperlipidemia  Assessment & Plan:  LDL 68, at goal.  Continue rosuvastatin 20 mg daily                Follow Up     Return in about 1 year (around 3/3/2026) for Next  scheduled follow up.    Patient was given instructions and counseling regarding his condition or for health maintenance advice. Please see specific information pulled into the AVS if appropriate.

## 2025-03-03 NOTE — ASSESSMENT & PLAN NOTE
Blood pressure significantly elevated in the office today.  However he has not taken her morning medicines as of now and came for the appointment after doing a night shift.  We discussed about the options.  Low-sodium diet.  Continue amlodipine, benazepril and hydrochlorothiazide at the current dose.  Start doing home blood pressure log, he got a new blood pressure machine.  If systolic blood pressure persistently above 145, amlodipine dose may be increased.

## 2025-04-04 ENCOUNTER — TELEPHONE (OUTPATIENT)
Dept: UROLOGY | Age: 75
End: 2025-04-04
Payer: MEDICARE

## 2025-04-04 NOTE — TELEPHONE ENCOUNTER
PATIENT RECEVED A LETTER ABOUT NOT DOING A PSA. HE DID NOT REALIZE THAT HE WAS SUPPOSED TO DO ONE 6 MONTHS AFTER HIS APPT IN JUNE. NOW HE HAS AN APPT IN JUNE 2025 AND NEEDS TO KNOW IF HE NEEDS TO DO A PSA NOW AND AGAIN IN JUNE OR JUST WAIT AND DO IT BEFORE HIS APPT.

## 2025-04-14 RX ORDER — OXYBUTYNIN CHLORIDE 5 MG/1
10 TABLET, EXTENDED RELEASE ORAL DAILY
Qty: 90 TABLET | Refills: 3 | Status: SHIPPED | OUTPATIENT
Start: 2025-04-14

## 2025-04-14 RX ORDER — HYDROCHLOROTHIAZIDE 12.5 MG/1
12.5 TABLET ORAL DAILY
Qty: 90 TABLET | Refills: 1 | Status: SHIPPED | OUTPATIENT
Start: 2025-04-14

## 2025-04-14 RX ORDER — CETIRIZINE HYDROCHLORIDE 10 MG/1
10 TABLET ORAL DAILY
Qty: 90 TABLET | Refills: 1 | Status: SHIPPED | OUTPATIENT
Start: 2025-04-14

## 2025-04-14 RX ORDER — ROSUVASTATIN CALCIUM 20 MG/1
20 TABLET, COATED ORAL DAILY
Qty: 90 TABLET | Refills: 1 | Status: SHIPPED | OUTPATIENT
Start: 2025-04-14

## 2025-05-01 ENCOUNTER — LAB (OUTPATIENT)
Dept: LAB | Facility: HOSPITAL | Age: 75
End: 2025-05-01
Payer: MEDICARE

## 2025-05-01 DIAGNOSIS — E11.9 TYPE 2 DIABETES MELLITUS WITHOUT COMPLICATION, WITHOUT LONG-TERM CURRENT USE OF INSULIN: ICD-10-CM

## 2025-05-01 LAB
ALBUMIN SERPL-MCNC: 4.1 G/DL (ref 3.5–5.2)
ALBUMIN UR-MCNC: 2.1 MG/DL
ALBUMIN/GLOB SERPL: 5.1 G/DL
ALP SERPL-CCNC: 133 U/L (ref 39–117)
ALT SERPL W P-5'-P-CCNC: 14 U/L (ref 1–41)
ANION GAP SERPL CALCULATED.3IONS-SCNC: 12 MMOL/L (ref 5–15)
AST SERPL-CCNC: 17 U/L (ref 1–40)
BILIRUB SERPL-MCNC: 0.6 MG/DL (ref 0–1.2)
BUN SERPL-MCNC: 21 MG/DL (ref 8–23)
BUN/CREAT SERPL: 15.3 (ref 7–25)
CALCIUM SPEC-SCNC: 8.7 MG/DL (ref 8.6–10.5)
CHLORIDE SERPL-SCNC: 103 MMOL/L (ref 98–107)
CHOLEST SERPL-MCNC: 135 MG/DL (ref 0–200)
CO2 SERPL-SCNC: 26 MMOL/L (ref 22–29)
CREAT SERPL-MCNC: 1.37 MG/DL (ref 0.76–1.27)
DEPRECATED RDW RBC AUTO: 39.7 FL (ref 37–54)
EGFRCR SERPLBLD CKD-EPI 2021: 54.8 ML/MIN/1.73
EOSINOPHIL # BLD MANUAL: 0.62 10*3/MM3 (ref 0–0.4)
EOSINOPHIL NFR BLD MANUAL: 2 % (ref 0.3–6.2)
ERYTHROCYTE [DISTWIDTH] IN BLOOD BY AUTOMATED COUNT: 12.8 % (ref 12.3–15.4)
GLOBULIN UR ELPH-MCNC: 0.8 GM/DL
GLUCOSE SERPL-MCNC: 98 MG/DL (ref 65–99)
HBA1C MFR BLD: 6.5 % (ref 4.8–5.6)
HCT VFR BLD AUTO: 38.8 % (ref 37.5–51)
HDLC SERPL-MCNC: 39 MG/DL (ref 40–60)
HGB BLD-MCNC: 12.8 G/DL (ref 13–17.7)
LDLC SERPL CALC-MCNC: 73 MG/DL (ref 0–100)
LDLC/HDLC SERPL: 1.81 {RATIO}
LYMPHOCYTES # BLD MANUAL: 22.46 10*3/MM3 (ref 0.7–3.1)
LYMPHOCYTES NFR BLD MANUAL: 8 % (ref 5–12)
MCH RBC QN AUTO: 28.4 PG (ref 26.6–33)
MCHC RBC AUTO-ENTMCNC: 33 G/DL (ref 31.5–35.7)
MCV RBC AUTO: 86.2 FL (ref 79–97)
MONOCYTES # BLD: 2.5 10*3/MM3 (ref 0.1–0.9)
NEUTROPHILS # BLD AUTO: 5.61 10*3/MM3 (ref 1.7–7)
NEUTROPHILS NFR BLD MANUAL: 18 % (ref 42.7–76)
PLAT MORPH BLD: NORMAL
PLATELET # BLD AUTO: 210 10*3/MM3 (ref 140–450)
PMV BLD AUTO: 10.1 FL (ref 6–12)
POTASSIUM SERPL-SCNC: 4 MMOL/L (ref 3.5–5.2)
PROT SERPL-MCNC: 4.9 G/DL (ref 6–8.5)
RBC # BLD AUTO: 4.5 10*6/MM3 (ref 4.14–5.8)
RBC MORPH BLD: NORMAL
SODIUM SERPL-SCNC: 141 MMOL/L (ref 136–145)
TRIGL SERPL-MCNC: 128 MG/DL (ref 0–150)
TSH SERPL DL<=0.05 MIU/L-ACNC: 2.18 UIU/ML (ref 0.27–4.2)
VARIANT LYMPHS NFR BLD MANUAL: 72 % (ref 19.6–45.3)
VLDLC SERPL-MCNC: 23 MG/DL (ref 5–40)
WBC MORPH BLD: NORMAL
WBC NRBC COR # BLD AUTO: 31.19 10*3/MM3 (ref 3.4–10.8)

## 2025-05-01 PROCEDURE — 85007 BL SMEAR W/DIFF WBC COUNT: CPT

## 2025-05-01 PROCEDURE — 80061 LIPID PANEL: CPT

## 2025-05-01 PROCEDURE — 85025 COMPLETE CBC W/AUTO DIFF WBC: CPT

## 2025-05-01 PROCEDURE — 80053 COMPREHEN METABOLIC PANEL: CPT

## 2025-05-01 PROCEDURE — 82043 UR ALBUMIN QUANTITATIVE: CPT

## 2025-05-01 PROCEDURE — 83036 HEMOGLOBIN GLYCOSYLATED A1C: CPT

## 2025-05-01 PROCEDURE — 84443 ASSAY THYROID STIM HORMONE: CPT

## 2025-05-06 ENCOUNTER — OFFICE VISIT (OUTPATIENT)
Dept: INTERNAL MEDICINE | Facility: CLINIC | Age: 75
End: 2025-05-06
Payer: MEDICARE

## 2025-05-06 VITALS
WEIGHT: 282.5 LBS | TEMPERATURE: 98.6 F | HEIGHT: 70 IN | BODY MASS INDEX: 40.44 KG/M2 | DIASTOLIC BLOOD PRESSURE: 78 MMHG | SYSTOLIC BLOOD PRESSURE: 132 MMHG | OXYGEN SATURATION: 98 % | HEART RATE: 86 BPM

## 2025-05-06 DIAGNOSIS — I10 PRIMARY HYPERTENSION: ICD-10-CM

## 2025-05-06 DIAGNOSIS — E78.2 MIXED HYPERLIPIDEMIA: ICD-10-CM

## 2025-05-06 DIAGNOSIS — D47.Z9 LOW GRADE B CELL LYMPHOPROLIFERATIVE DISORDER: Primary | ICD-10-CM

## 2025-05-06 DIAGNOSIS — E55.9 VITAMIN D DEFICIENCY: ICD-10-CM

## 2025-05-06 DIAGNOSIS — R47.9 SPEECH DISTURBANCE, UNSPECIFIED TYPE: ICD-10-CM

## 2025-05-06 DIAGNOSIS — K21.9 GASTROESOPHAGEAL REFLUX DISEASE, UNSPECIFIED WHETHER ESOPHAGITIS PRESENT: ICD-10-CM

## 2025-05-06 DIAGNOSIS — E11.9 TYPE 2 DIABETES MELLITUS WITHOUT COMPLICATION, WITHOUT LONG-TERM CURRENT USE OF INSULIN: ICD-10-CM

## 2025-05-06 RX ORDER — OMEPRAZOLE 20 MG/1
20 CAPSULE, DELAYED RELEASE ORAL DAILY
Qty: 90 CAPSULE | Refills: 1 | Status: SHIPPED | OUTPATIENT
Start: 2025-05-06

## 2025-05-06 NOTE — PROGRESS NOTES
"Chief Complaint  Hyperlipidemia, Hypertension, and Diabetes      Subjective      History of Present Illness  The patient is a 74-year-old male presenting for follow-up regarding diabetes mellitus, hypertension, hyperlipidemia, and a low-grade B-cell lymphoproliferative disorder managed by Dr. Chanel.    The patient reports weight gain recently, glycemic control has also worsened but has also led to increased appetite over the past 2-3 months. Trulicity initially worked well but recently does not seem to be effective. The patient denies experiencing hypoglycemia, dizziness, or other related symptoms. He expresses interest in transitioning to Mounjaro.    An eye examination was conducted in November 2024. The patient continues to experience intermittent numbness in the toes, with no significant findings from podiatric evaluations. He also reports occasional leg cramps, which are managed with stretching exercises, and intermittent leg edema, which he does not manage with compression stockings. Mild exertional dyspnea is noted, likely secondary to weight gain. The patient denies chest pain, headaches, or dizziness.    The patient has experienced memory lapses over the past 3-4 months, primarily characterized by difficulty recalling words, without significant forgetfulness or mood alterations.    He requests a refill of omeprazole. The patient denies symptoms of upper respiratory infection, palpable masses, or night sweats. He has received both doses of the COVID-19 vaccine, with post-vaccination arm soreness. He contracted COVID-19 early in the pandemic, initially presenting asymptomatically but later developing severe symptoms.         Objective   Vital Signs:   Vitals:    05/06/25 0800   BP: 132/78   Pulse: 86   Temp: 98.6 °F (37 °C)   SpO2: 98%   Weight: 128 kg (282 lb 8 oz)   Height: 177.8 cm (70\")     Body mass index is 40.53 kg/m².    Wt Readings from Last 3 Encounters:   05/06/25 128 kg (282 lb 8 oz)   03/03/25 " 127 kg (280 lb)   02/24/25 129 kg (285 lb)     BP Readings from Last 3 Encounters:   05/06/25 132/78   03/03/25 161/88   02/24/25 144/67       Health Maintenance   Topic Date Due    URINE MICROALBUMIN-CREATININE RATIO (uACR)  Never done    AAA SCREEN ONCE  Never done    DIABETIC FOOT EXAM  03/12/2025    Pneumococcal Vaccine 50+ (1 of 2 - PCV) 11/02/2025 (Originally 11/30/1969)    TDAP/TD VACCINES (1 - Tdap) 11/02/2025 (Originally 11/30/1969)    COVID-19 Vaccine (3 - 2024-25 season) 11/06/2025 (Originally 9/1/2024)    INFLUENZA VACCINE  07/01/2025    HEMOGLOBIN A1C  11/01/2025    ANNUAL WELLNESS VISIT  11/06/2025    LIPID PANEL  05/01/2026    DIABETIC EYE EXAM  05/06/2026    COLORECTAL CANCER SCREENING  01/29/2028    HEPATITIS C SCREENING  Completed    ZOSTER VACCINE  Completed       Physical Exam  Vitals and nursing note reviewed.   Constitutional:       General: He is not in acute distress.     Appearance: Normal appearance.   HENT:      Head: Normocephalic and atraumatic.      Right Ear: External ear normal.      Left Ear: External ear normal.      Nose: Nose normal.      Mouth/Throat:      Mouth: Mucous membranes are moist.   Eyes:      Conjunctiva/sclera: Conjunctivae normal.   Cardiovascular:      Rate and Rhythm: Normal rate and regular rhythm.      Pulses: Normal pulses.           Dorsalis pedis pulses are 2+ on the right side and 2+ on the left side.      Heart sounds: Normal heart sounds. No murmur heard.     No friction rub. No gallop.   Pulmonary:      Effort: Pulmonary effort is normal. No respiratory distress.      Breath sounds: No wheezing, rhonchi or rales.   Musculoskeletal:      Cervical back: Neck supple.   Feet:      Right foot:      Protective Sensation: 5 sites tested.  3 sites sensed.      Skin integrity: Skin integrity normal. No ulcer or blister.      Toenail Condition: Right toenails are normal.      Left foot:      Protective Sensation: 5 sites tested.  3 sites sensed.      Skin integrity:  Skin integrity normal. No ulcer or blister.      Toenail Condition: Left toenails are normal.      Comments: Diabetic Foot Exam Performed and Monofilament Test Performed  Paresthesia of toes  Skin:     General: Skin is warm and dry.   Neurological:      General: No focal deficit present.      Mental Status: He is alert and oriented to person, place, and time.   Psychiatric:         Mood and Affect: Mood normal.         Behavior: Behavior normal.        Physical Exam        Result Review :  The following data was reviewed by: YEFRI Yung on 05/06/2025:         Results          Procedures            Assessment & Plan  Low grade B cell lymphoproliferative disorder    Orders:    CBC Auto Differential; Future    Type 2 diabetes mellitus without complication, without long-term current use of insulin      Orders:    Comprehensive Metabolic Panel; Future    Hemoglobin A1c; Future    Lipid Panel; Future    TSH; Future    Microalbumin / Creatinine Urine Ratio - Urine, Clean Catch; Future    Tirzepatide 2.5 MG/0.5ML solution auto-injector; Inject 2.5 mg under the skin into the appropriate area as directed 1 (One) Time Per Week.    Mixed hyperlipidemia            Primary hypertension           Gastroesophageal reflux disease, unspecified whether esophagitis present         Speech disturbance, unspecified type    Orders:    RPR; Future    Vitamin B12; Future    Vitamin D,25-Hydroxy; Future    Vitamin D deficiency    Orders:    RPR; Future    Vitamin B12; Future    Vitamin D,25-Hydroxy; Future         Assessment & Plan  1. Diabetes Mellitus  - A1c slightly increased  - Weight gain and hunger noted with Trulicity  - Transitioning to Mounjaro 2.5 mg, with plan to increase to 5 mg if needed  - Labs in 3 months    2. Hypertension  - BP within normal range  - No chest pain, headache, dizziness, or hypoglycemia  - Continue current regimen and monitor BP    3. Hyperlipidemia  - Occasional leg cramps possibly related to  Crestor  - Advised stretching exercises  - Continue lipid-lowering therapy    4. Low-grade B-cell lymphoproliferative disorder  - WBC count increased from 27 to 31  - No fatigue, fever, night sweats, or lumps  - Message sent to Dr. Chanel regarding elevation in WBCs    5. Memory issues  - Occasional lapses possibly due to aging, B12 deficiency, or depression  - Plan for MoCA test and labs for vitamin deficiencies at next visit    6. Medication Management  - Omeprazole prescription sent  - Advised fiber supplement for constipation if this persists with transition monitor    Follow-up  - In 8 weeks    Patient or patient representative verbalized consent for the use of Ambient Listening during the visit with  YEFRI Yung for chart documentation. 5/6/2025  08:45 EDT      FOLLOW UP  Return in about 8 weeks (around 7/1/2025).  Patient was given instructions and counseling regarding his condition or for health maintenance advice. Please see specific information pulled into the AVS if appropriate.     YEFRI Yung  05/06/25  08:46 EDT    CURRENT & DISCONTINUED MEDICATIONS  Current Outpatient Medications   Medication Instructions    acetaminophen (TYLENOL) 500 mg, Oral, Every 6 Hours PRN    amLODIPine-benazepril (LOTREL 5-20) 5-20 MG per capsule 1 capsule, Oral, Daily    aspirin 81 mg, Oral, Daily    Blood Pressure Monitoring (Blood Pressure Cuff) misc Use to take blood 1-2 times a day    cetirizine (ZYRTEC) 10 mg, Oral, Daily    Diclofenac Sodium (VOLTAREN) 4 g, Topical, 4 Times Daily PRN    doxazosin (CARDURA) 4 mg, Oral, Daily    emollient cream     finasteride (PROSCAR) 5 mg, Oral, Daily    fluocinonide (LIDEX) 0.05 % cream 1 Application, Every 8 Hours Scheduled    hydroCHLOROthiazide 12.5 mg, Oral, Daily    metFORMIN ER (GLUCOPHAGE-XR) 1,000 mg, Oral, Daily With Breakfast    omeprazole (PRILOSEC) 20 mg, Oral, Daily    oxybutynin XL (DITROPAN-XL) 10 mg, Oral, Daily    rosuvastatin (CRESTOR) 20 mg, Oral,  Daily    sildenafil (VIAGRA) 100 mg, Oral, Daily PRN    Sodium Fluoride 1.1 % cream     Tirzepatide 2.5 mg, Subcutaneous, Weekly    triamcinolone (KENALOG) 0.5 % cream 1 application , Topical, 3 Times Daily       Medications Discontinued During This Encounter   Medication Reason    Dulaglutide (Trulicity) 0.75 MG/0.5ML solution auto-injector     omeprazole (priLOSEC) 20 MG capsule Reorder

## 2025-05-06 NOTE — ASSESSMENT & PLAN NOTE
Orders:    Comprehensive Metabolic Panel; Future    Hemoglobin A1c; Future    Lipid Panel; Future    TSH; Future    Microalbumin / Creatinine Urine Ratio - Urine, Clean Catch; Future    Tirzepatide 2.5 MG/0.5ML solution auto-injector; Inject 2.5 mg under the skin into the appropriate area as directed 1 (One) Time Per Week.

## 2025-05-07 ENCOUNTER — TELEPHONE (OUTPATIENT)
Dept: INTERNAL MEDICINE | Facility: CLINIC | Age: 75
End: 2025-05-07
Payer: MEDICARE

## 2025-05-07 ENCOUNTER — PRIOR AUTHORIZATION (OUTPATIENT)
Dept: INTERNAL MEDICINE | Facility: CLINIC | Age: 75
End: 2025-05-07
Payer: MEDICARE

## 2025-05-07 NOTE — TELEPHONE ENCOUNTER
Pt stated he needs PA for his Tirzepatide medication for [facility] pharmacy. Pt was advised message will be sent.

## 2025-05-07 NOTE — TELEPHONE ENCOUNTER
Mounjaro 2.5MG/0.5ML auto-injectors    Status:Approved;Review Type:Prior Auth;Coverage Start Date:04/07/2025;Coverage End Date:12/31/2099

## 2025-05-19 ENCOUNTER — OFFICE VISIT (OUTPATIENT)
Dept: PODIATRY | Facility: CLINIC | Age: 75
End: 2025-05-19
Payer: MEDICARE

## 2025-05-19 VITALS
SYSTOLIC BLOOD PRESSURE: 143 MMHG | OXYGEN SATURATION: 95 % | DIASTOLIC BLOOD PRESSURE: 79 MMHG | WEIGHT: 282 LBS | BODY MASS INDEX: 40.37 KG/M2 | HEART RATE: 63 BPM | HEIGHT: 70 IN

## 2025-05-19 DIAGNOSIS — B35.1 ONYCHOMYCOSIS: ICD-10-CM

## 2025-05-19 DIAGNOSIS — L60.0 ONYCHOCRYPTOSIS: ICD-10-CM

## 2025-05-19 DIAGNOSIS — E11.8 DM FEET: ICD-10-CM

## 2025-05-19 DIAGNOSIS — G62.9 NEUROPATHY: Primary | ICD-10-CM

## 2025-05-19 DIAGNOSIS — E11.9 DIABETES MELLITUS WITHOUT COMPLICATION: ICD-10-CM

## 2025-05-19 DIAGNOSIS — M79.672 FOOT PAIN, BILATERAL: ICD-10-CM

## 2025-05-19 DIAGNOSIS — M79.671 FOOT PAIN, BILATERAL: ICD-10-CM

## 2025-05-19 PROCEDURE — 1160F RVW MEDS BY RX/DR IN RCRD: CPT | Performed by: PODIATRIST

## 2025-05-19 PROCEDURE — 3078F DIAST BP <80 MM HG: CPT | Performed by: PODIATRIST

## 2025-05-19 PROCEDURE — 3077F SYST BP >= 140 MM HG: CPT | Performed by: PODIATRIST

## 2025-05-19 PROCEDURE — 11721 DEBRIDE NAIL 6 OR MORE: CPT | Performed by: PODIATRIST

## 2025-05-19 PROCEDURE — 1159F MED LIST DOCD IN RCRD: CPT | Performed by: PODIATRIST

## 2025-05-19 NOTE — PROGRESS NOTES
[facility] - PODIATRY    Today's Date: 25    Patient Name: Quique Valdez  MRN: [id number]  CSN: [id number]  PCP: Charmaine Pate APRN, Last PCP Visit: 2025  Referring Provider: No ref. provider found    SUBJECTIVE     Chief Complaint   Patient presents with    Left Foot - Follow-up, Nail Problem    Right Foot - Follow-up, Nail Problem     HPI: Quique Valdez, a 74 y.o.male, comes to clinic.    New, Established, New Problem:  Established  Location:  Toenails  Duration:   Greater than five years  Onset:  Gradual  Nature:  sore with palpation.  Stable, worsening, improving:  stable  Aggravating factors:  Pain with shoe gear and ambulation.  Previous Treatment:  debridement    Medical changes: No changes    Not required to check blood sugars at home.    Patient denies any fevers, chills, nausea, vomiting, shortness of breath, nor any other constitutional signs nor symptoms.       I have reviewed/confirmed previously documented HPI with no changes.       Past Medical History:   Diagnosis Date    Diabetes mellitus     Elevated WBC count     GERD (gastroesophageal reflux disease)     High cholesterol     Hypertension     Leukocytosis     Sleep apnea      Past Surgical History:   Procedure Laterality Date    CIRCUMCISION N/A 2023    Procedure: Cystoscopy and Circumcision;  Surgeon: Lashonda Vargas MD;  Location: [facility] OR;  Service: Urology;  Laterality: N/A;    PROSTATE BIOPSY      UMBILICAL HERNIA REPAIR       Family History   Problem Relation Age of Onset    Lung cancer Other      Social History     Socioeconomic History    Marital status:     Number of children: 0   Tobacco Use    Smoking status: Former     Current packs/day: 0.00     Average packs/day: 1 pack/day for 10.0 years (10.0 ttl pk-yrs)     Types: Cigarettes     Start date:      Quit date:      Years since quittin.3    Smokeless tobacco: Never    Tobacco comments:     10-25 PACK YEARS   Vaping  Use    Vaping status: Never Used   Substance and Sexual Activity    Alcohol use: Not Currently     Comment: occasional    Drug use: Not Currently    Sexual activity: Defer     No Known Allergies  Current Outpatient Medications   Medication Sig Dispense Refill    acetaminophen (TYLENOL) 500 MG tablet Take 1 tablet by mouth Every 6 (Six) Hours As Needed for Mild Pain. 120 tablet 1    amLODIPine-benazepril (LOTREL 5-20) 5-20 MG per capsule Take 1 capsule by mouth Daily. 90 capsule 1    aspirin 81 MG EC tablet Take 1 tablet by mouth Daily.      Blood Pressure Monitoring (Blood Pressure Cuff) misc Use to take blood 1-2 times a day 1 each 0    cetirizine (zyrTEC) 10 MG tablet Take 1 tablet by mouth Daily. 90 tablet 1    Diclofenac Sodium (Voltaren) 1 % gel gel Apply 4 g topically to the appropriate area as directed 4 (Four) Times a Day As Needed (hand pain). 100 g 1    doxazosin (Cardura) 4 MG tablet Take 1 tablet by mouth Daily for 360 days. 90 tablet 2    emollient cream       finasteride (PROSCAR) 5 MG tablet Take 1 tablet by mouth Daily. 90 tablet 3    fluocinonide (LIDEX) 0.05 % cream Apply 1 Application topically to the appropriate area as directed Every 8 (Eight) Hours.      hydroCHLOROthiazide 12.5 MG tablet Take 1 tablet by mouth Daily. 90 tablet 1    metFORMIN ER (GLUCOPHAGE-XR) 500 MG 24 hr tablet Take 2 tablets by mouth Daily With Breakfast. 180 tablet 1    omeprazole (priLOSEC) 20 MG capsule Take 1 capsule by mouth Daily. 90 capsule 1    oxybutynin XL (DITROPAN-XL) 5 MG 24 hr tablet Take 2 tablets by mouth Daily. 90 tablet 3    rosuvastatin (Crestor) 20 MG tablet Take 1 tablet by mouth Daily. 90 tablet 1    sildenafil (VIAGRA) 100 MG tablet Take 1 tablet by mouth Daily As Needed for Erectile Dysfunction. 20 tablet 2    Sodium Fluoride 1.1 % cream       Tirzepatide 2.5 MG/0.5ML solution auto-injector Inject 2.5 mg under the skin into the appropriate area as directed 1 (One) Time Per Week. 2 mL 1     triamcinolone (KENALOG) 0.5 % cream Apply 1 application  topically to the appropriate area as directed 3 (Three) Times a Day. 45 g 0     No current facility-administered medications for this visit.     Review of Systems   Constitutional: Negative.    Skin:         Painful toenails   Neurological:  Positive for numbness.   All other systems reviewed and are negative.      OBJECTIVE     Vitals:    05/19/25 0822   BP: 143/79   Pulse: 63   SpO2: 95%       Patient seen in no apparent distress.      PHYSICAL EXAM:     Foot/Ankle Exam    GENERAL  Appearance:  obese, elderly and chronically ill  Orientation:  AAOx3  Affect:  appropriate  Gait:  unimpaired  Assistance:  independent  Right shoe gear: casual shoe  Left shoe gear: casual shoe    VASCULAR     Right Foot Vascularity   Dorsalis pedis:  2+  Posterior tibial:  2+  Skin temperature:  warm  Edema grading:  None  CFT:  < 3 seconds  Pedal hair growth:  Absent  Varicosities:  moderate varicosities     Left Foot Vascularity   Dorsalis pedis:  2+  Posterior tibial:  2+  Skin temperature:  warm  Edema grading:  None  CFT:  < 3 seconds  Pedal hair growth:  Present  Varicosities:  moderate varicosities     NEUROLOGIC     Right Foot Neurologic   Light touch sensation: diminished  Vibratory sensation: diminished  Hot/Cold sensation: diminished  Protective Sensation using [place] Monofilament:   Sites intact: 4  Sites tested: 10     Left Foot Neurologic   Light touch sensation: diminished  Vibratory sensation: diminished  Hot/Cold sensation:  diminished  Protective Sensation using [place] Monofilament:   Sites intact: 4  Sites tested: 10    MUSCLE STRENGTH     Right Foot Muscle Strength   Foot dorsiflexion:  4  Foot plantar flexion:  4  Foot inversion:  4  Foot eversion:  4     Left Foot Muscle Strength   Foot dorsiflexion:  4  Foot plantar flexion:  4  Foot inversion:  4  Foot eversion:  4    RANGE OF MOTION     Right Foot Range of Motion   Foot and ankle ROM  within normal limits       Left Foot Range of Motion   Foot and ankle ROM within normal limits      DERMATOLOGIC      Right Foot Dermatologic   Skin  Right foot skin is intact.   Nails  2.  Positive for elongated, onychomycosis, abnormal thickness, subungual debris and ingrown toenail.  3.  Positive for elongated, onychomycosis, abnormal thickness, subungual debris and ingrown toenail.  4.  Positive for elongated, onychomycosis, abnormal thickness, subungual debris and ingrown toenail.  5.  Positive for elongated, onychomycosis, abnormal thickness, subungual debris and ingrown toenail.     Left Foot Dermatologic   Skin  Left foot skin is intact.   Nails  2.  Positive for elongated, onychomycosis, abnormal thickness, subungual debris and ingrown toenail.  3.  Positive for elongated, onychomycosis, abnormal thickness, subungual debris and ingrown toenail.  4.  Positive for elongated, onychomycosis, abnormally thick, subungual debris and ingrown toenail.  5.  Positive for elongated, onychomycosis, abnormally thick, subungual debris and ingrown toenail.    I have reexamined the patient the results are consistent with the previously documented exam.    ASSESSMENT/PLAN     Diagnoses and all orders for this visit:    1. Neuropathy (Primary)    2. Onychomycosis    3. Onychocryptosis    4. Foot pain, bilateral    5. Diabetes mellitus without complication    6. DM feet    Comprehensive lower extremity examination and evaluation was performed.    Discussed findings and treatment plan including risks, benefits, and treatment options with patient in detail. Patient agreed with treatment plan.    Toenails 2, 3, 4, 5 on Right and 2, 3, 4, 5 on Left were debrided with nail nippers then filed with a Dereckmel nail brinda.  Patient tolerated procedure well without complications.    Patient is to monitor for recurrence and any new symptoms and to contact Dr. Javed's office for a follow-up appointment.      The patient states  understanding and agreement with this plan.    An After Visit Summary was printed and given to the patient at discharge, including (if requested) any available informative/educational handouts regarding diagnosis, treatment, or medications. All questions were answered to patient/family satisfaction. Should symptoms fail to improve or worsen they agree to call or return to clinic or to go to the Emergency Department. Discussed the importance of following up with any needed screening tests/labs/specialist appointments and any requested follow-up recommended by me today. Importance of maintaining follow-up discussed and patient accepts that missed appointments can delay diagnosis and potentially lead to worsening of conditions.    Return in about 9 weeks (around 7/21/2025) for Toenail Care., or sooner if acute issues arise.    I have reviewed the assessment and plan and verified the accuracy of it. No changes to assessment and plan since the information was documented. Jonathan Javed DPM 05/19/25     I have dictated this note utilizing Dragon Dictation.  Please note that portions of this note were completed with a voice recognition program.  Part of this note may be an electronic transcription/translation of spoken language to printed text using the Dragon Dictation System.      This document has been electronically signed by Jonathan Javed DPM on May 19, 2025 08:56 EDT

## 2025-06-18 ENCOUNTER — LAB (OUTPATIENT)
Dept: LAB | Facility: HOSPITAL | Age: 75
End: 2025-06-18
Payer: MEDICARE

## 2025-06-18 DIAGNOSIS — R47.9 SPEECH DISTURBANCE, UNSPECIFIED TYPE: ICD-10-CM

## 2025-06-18 DIAGNOSIS — D47.Z9 LOW GRADE B CELL LYMPHOPROLIFERATIVE DISORDER: ICD-10-CM

## 2025-06-18 DIAGNOSIS — E55.9 VITAMIN D DEFICIENCY: ICD-10-CM

## 2025-06-18 DIAGNOSIS — R97.20 ELEVATED PROSTATE SPECIFIC ANTIGEN (PSA): ICD-10-CM

## 2025-06-18 LAB
25(OH)D3 SERPL-MCNC: 29.1 NG/ML (ref 30–100)
ANISOCYTOSIS BLD QL: ABNORMAL
BASOPHILS # BLD MANUAL: 0.26 10*3/MM3 (ref 0–0.2)
BASOPHILS NFR BLD MANUAL: 1 % (ref 0–1.5)
DEPRECATED RDW RBC AUTO: 39.6 FL (ref 37–54)
EOSINOPHIL # BLD MANUAL: 0.52 10*3/MM3 (ref 0–0.4)
EOSINOPHIL NFR BLD MANUAL: 2 % (ref 0.3–6.2)
ERYTHROCYTE [DISTWIDTH] IN BLOOD BY AUTOMATED COUNT: 12.9 % (ref 12.3–15.4)
HCT VFR BLD AUTO: 41.6 % (ref 37.5–51)
HGB BLD-MCNC: 13.4 G/DL (ref 13–17.7)
LYMPHOCYTES # BLD MANUAL: 17.65 10*3/MM3 (ref 0.7–3.1)
LYMPHOCYTES NFR BLD MANUAL: 4.9 % (ref 5–12)
MCH RBC QN AUTO: 27.9 PG (ref 26.6–33)
MCHC RBC AUTO-ENTMCNC: 32.2 G/DL (ref 31.5–35.7)
MCV RBC AUTO: 86.7 FL (ref 79–97)
MONOCYTES # BLD: 1.28 10*3/MM3 (ref 0.1–0.9)
NEUTROPHILS # BLD AUTO: 6.39 10*3/MM3 (ref 1.7–7)
NEUTROPHILS NFR BLD MANUAL: 24.5 % (ref 42.7–76)
PLAT MORPH BLD: NORMAL
PLATELET # BLD AUTO: 192 10*3/MM3 (ref 140–450)
PMV BLD AUTO: 10.7 FL (ref 6–12)
POIKILOCYTOSIS BLD QL SMEAR: ABNORMAL
PSA SERPL-MCNC: 7.29 NG/ML (ref 0–4)
RBC # BLD AUTO: 4.8 10*6/MM3 (ref 4.14–5.8)
VARIANT LYMPHS NFR BLD MANUAL: 11.8 % (ref 0–5)
VARIANT LYMPHS NFR BLD MANUAL: 55.9 % (ref 19.6–45.3)
VIT B12 BLD-MCNC: 388 PG/ML (ref 211–946)
WBC MORPH BLD: NORMAL
WBC NRBC COR # BLD AUTO: 26.07 10*3/MM3 (ref 3.4–10.8)

## 2025-06-18 PROCEDURE — 82306 VITAMIN D 25 HYDROXY: CPT

## 2025-06-18 PROCEDURE — 85025 COMPLETE CBC W/AUTO DIFF WBC: CPT

## 2025-06-18 PROCEDURE — 82607 VITAMIN B-12: CPT

## 2025-06-18 PROCEDURE — 84153 ASSAY OF PSA TOTAL: CPT

## 2025-06-18 PROCEDURE — 85007 BL SMEAR W/DIFF WBC COUNT: CPT

## 2025-06-18 PROCEDURE — 86592 SYPHILIS TEST NON-TREP QUAL: CPT

## 2025-06-18 PROCEDURE — 36415 COLL VENOUS BLD VENIPUNCTURE: CPT

## 2025-06-19 LAB — RPR SER QL: NORMAL

## 2025-06-24 ENCOUNTER — OFFICE VISIT (OUTPATIENT)
Dept: UROLOGY | Age: 75
End: 2025-06-24
Payer: MEDICARE

## 2025-06-24 VITALS — BODY MASS INDEX: 40.37 KG/M2 | HEIGHT: 70 IN | WEIGHT: 282 LBS

## 2025-06-24 DIAGNOSIS — N52.9 ERECTILE DYSFUNCTION, UNSPECIFIED ERECTILE DYSFUNCTION TYPE: ICD-10-CM

## 2025-06-24 DIAGNOSIS — R39.15 BENIGN PROSTATIC HYPERPLASIA (BPH) WITH URINARY URGENCY: Primary | ICD-10-CM

## 2025-06-24 DIAGNOSIS — R97.20 ELEVATED PROSTATE SPECIFIC ANTIGEN (PSA): ICD-10-CM

## 2025-06-24 DIAGNOSIS — N40.1 BENIGN PROSTATIC HYPERPLASIA (BPH) WITH URINARY URGENCY: Primary | ICD-10-CM

## 2025-06-24 LAB — URINE VOLUME: NORMAL

## 2025-06-24 PROCEDURE — 99214 OFFICE O/P EST MOD 30 MIN: CPT | Performed by: UROLOGY

## 2025-06-24 PROCEDURE — 1160F RVW MEDS BY RX/DR IN RCRD: CPT | Performed by: UROLOGY

## 2025-06-24 PROCEDURE — 1159F MED LIST DOCD IN RCRD: CPT | Performed by: UROLOGY

## 2025-06-24 PROCEDURE — G2211 COMPLEX E/M VISIT ADD ON: HCPCS | Performed by: UROLOGY

## 2025-06-24 PROCEDURE — 51798 US URINE CAPACITY MEASURE: CPT | Performed by: UROLOGY

## 2025-06-24 RX ORDER — DOXAZOSIN 4 MG/1
4 TABLET ORAL DAILY
Qty: 90 TABLET | Refills: 3 | Status: SHIPPED | OUTPATIENT
Start: 2025-06-24 | End: 2026-06-19

## 2025-06-24 RX ORDER — FINASTERIDE 5 MG/1
5 TABLET, FILM COATED ORAL DAILY
Qty: 90 TABLET | Refills: 3 | Status: SHIPPED | OUTPATIENT
Start: 2025-06-24

## 2025-06-24 NOTE — PROGRESS NOTES
UROLOGY OFFICE FOLLOW UP NOTE    Subjective   HPI  Quique Valdez is a 74 y.o. male. . Presents for follow-up elevated PSA, BPH with LUTS, urinary urgency and ED. On doxazosin and finasteride, oxybutynin and on-demand Viagra.      History of Present Illness  The patient reports no difficulty in urination, however, he occasionally experiences minor dribbling before reaching the bathroom.  He is taking all of his medications listed above.  Overall states he is doing well.    Update 6/24/2025:   Presents for follow-up elevated PSA, BPH with LUTS, urinary urgency and ED. On doxazosin and finasteride, oxybutynin and on-demand Viagra.      History of Present Illness  He reports no difficulties with urination. He is currently on a regimen of doxazosin, which he takes twice daily. Additionally, he is taking oxybutynin for urinary urgency and frequency. He has been using Viagra intermittently and has a sufficient supply at present. His medication list also includes finasteride, which he takes regularly.      _____  Circumcision and cystoscopy, 1/13/2023     Cystoscopic findings with prostatomegaly with, bilateral coapting lateral lobes, median lobe, posterior bladder wall diverticulum; no significant trabeculations; no masses or tumors     PSA trend  6/18/2025: 7.29  6/14/2024: 5.81  5/18/2023: 5.680  11/22/2022: 7.48 (on finasteride)  11/24/21: 8.67  10/2020: 11.28  6/2019: 16.45  11/2017: 8.3  1/2017: 7.5  6/2016: 7.2  11/2015: 7.2  6/15: 7.2  3/2015: 7.8     Prostate biopsy: 6/18/2015-right and left negative for malignancy     MRI prostate 9/10/2019: 80 g prostate; PIRAD II x2 lesions; No suspicious lesions (PIRAD IV or V) for malignancy     Results for orders placed or performed in visit on 06/24/25   Bladder Scan    Collection Time: 06/24/25  8:50 AM   Result Value Ref Range    Urine Volume 0ml          Review of systems  A review of systems was performed, and positive findings are noted in the HPI.    Objective  "    Vital Signs:   Ht 177.8 cm (70\")   Wt 128 kg (282 lb)   BMI 40.46 kg/m²       Physical exam  No acute distress, well-nourished  Awake alert and oriented  Mood normal; affect normal  Physical Exam        Bladder Scan interpretation 06/24/2025    Estimation of residual urine via BVI 3000 [address] Bladder Scan  Performed by: TERESITA Nix  Residual Urine: 0 ml  Indication: Benign prostatic hyperplasia (BPH) with urinary urgency    Elevated prostate specific antigen (PSA)    Erectile dysfunction, unspecified erectile dysfunction type   Position: Supine  Examination: Incremental scanning of the suprapubic area using 2.0 MHz transducer using copious amounts of acoustic gel.   Findings: An anechoic area was demonstrated which represented the bladder, with measurement of residual urine as noted. I inspected this myself. In that the residual urine was stable or insignificant, refer to plan for treatment and plan necessary at this time.     Problem List:  Patient Active Problem List   Diagnosis    BPH (benign prostatic hyperplasia)    Elevated prostate specific antigen (PSA)    Esophageal reflux    Hypertensive disease    Mixed hyperlipidemia    Low grade B cell lymphoproliferative disorder    Leukocytosis    Sciatic leg pain    Type 2 diabetes mellitus without complication, without long-term current use of insulin    Phimosis of penis    Chest pain    Class 2 severe obesity due to excess calories with serious comorbidity and body mass index (BMI) of 38.0 to 38.9 in adult       Assessment & Plan   Diagnoses and all orders for this visit:    1. Benign prostatic hyperplasia (BPH) with urinary urgency (Primary)  -     Bladder Scan  -     doxazosin (Cardura) 4 MG tablet; Take 1 tablet by mouth Daily for 360 days.  Dispense: 90 tablet; Refill: 3  -     finasteride (PROSCAR) 5 MG tablet; Take 1 tablet by mouth Daily.  Dispense: 90 tablet; Refill: 3    2. Elevated prostate specific antigen (PSA)  -     MRI Prostate With & Without " Contrast; Future    3. Erectile dysfunction, unspecified erectile dysfunction type      Emptying bladder without postvoid residual  Stable from a urination standpoint, no new complaints today  Continue current treatment for ED    Medications refilled    Assessment & Plan    PSA levels have shown an increase. There is a history of an enlarged prostate, as evidenced by an MRI conducted in 2019. He has been on finasteride medication and had a negative biopsy in 2015. An MRI of the prostate will be ordered to further investigate the elevated PSA levels. The MRI will be scheduled at [facility], and he will be contacted to arrange the appointment. If the MRI results indicate any abnormalities, a biopsy may be considered. The MRI should be completed before the follow-up visit, which is scheduled for mid-September, with follow-up to discuss results approximately 10 weeks from now.        Follow-up  A follow-up visit is scheduled for mid-September, approximately 10 weeks from now, MRI prostate prior       All questions addressed      Patient or patient representative verbalized consent for the use of Ambient Listening during the visit with  Lashonda Vargas MD for chart documentation. 6/24/2025  22:17 EDT

## 2025-07-02 ENCOUNTER — OFFICE VISIT (OUTPATIENT)
Dept: INTERNAL MEDICINE | Facility: CLINIC | Age: 75
End: 2025-07-02
Payer: MEDICARE

## 2025-07-02 VITALS
OXYGEN SATURATION: 96 % | DIASTOLIC BLOOD PRESSURE: 80 MMHG | WEIGHT: 267 LBS | SYSTOLIC BLOOD PRESSURE: 118 MMHG | RESPIRATION RATE: 18 BRPM | BODY MASS INDEX: 38.22 KG/M2 | HEART RATE: 86 BPM | HEIGHT: 70 IN | TEMPERATURE: 97.7 F

## 2025-07-02 DIAGNOSIS — I10 PRIMARY HYPERTENSION: Primary | Chronic | ICD-10-CM

## 2025-07-02 DIAGNOSIS — K21.9 GASTROESOPHAGEAL REFLUX DISEASE, UNSPECIFIED WHETHER ESOPHAGITIS PRESENT: ICD-10-CM

## 2025-07-02 DIAGNOSIS — E11.9 TYPE 2 DIABETES MELLITUS WITHOUT COMPLICATION, WITHOUT LONG-TERM CURRENT USE OF INSULIN: ICD-10-CM

## 2025-07-02 DIAGNOSIS — R53.83 FATIGUE, UNSPECIFIED TYPE: ICD-10-CM

## 2025-07-02 DIAGNOSIS — D47.Z9 LOW GRADE B CELL LYMPHOPROLIFERATIVE DISORDER: ICD-10-CM

## 2025-07-02 RX ORDER — AMLODIPINE AND BENAZEPRIL HYDROCHLORIDE 5; 20 MG/1; MG/1
1 CAPSULE ORAL DAILY
Qty: 90 CAPSULE | Refills: 1 | Status: SHIPPED | OUTPATIENT
Start: 2025-07-02

## 2025-07-02 RX ORDER — METFORMIN HYDROCHLORIDE 500 MG/1
1000 TABLET, EXTENDED RELEASE ORAL
Qty: 180 TABLET | Refills: 1 | Status: SHIPPED | OUTPATIENT
Start: 2025-07-02

## 2025-07-02 RX ORDER — DOXAZOSIN 8 MG/1
4 TABLET ORAL NIGHTLY
COMMUNITY
Start: 2025-06-25

## 2025-07-02 NOTE — PROGRESS NOTES
"Chief Complaint  GERD (8 week follow up Omeprazole) and Diabetes (Mounjaro 2.5 follow up- doing well. Increase? )      Subjective      History of Present Illness  The patient, a 74-year-old male, presents for follow-up regarding diabetes mellitus and gastroesophageal reflux disease (GERD).    Since initiating Mounjaro 2.5 mg, he reports intermittent episodes of emesis, constipation, and weekly diarrhea. He has experienced a reduction in appetite, leading to early satiety and a 15-pound weight loss. Dr. Taylor noted an elevation in his prostate-specific antigen (PSA) level to 7.2; a follow-up and MRI are scheduled for 09/2025. The patient plans to contact her office if he does not receive a call regarding the MRI prior to his vacation on 07/20/2025. He requests a 90-day supply of Mounjaro for his vacation.    Despite daily administration of omeprazole, he experiences occasional symptoms of GERD and utilizes calcium carbonate (Tums) for pyrosis relief. He describes symptoms including an acidic taste and episodes resembling myocardial infarction, which have been present for several years.    The patient reports fatigue and decreased energy levels, and is considering vitamin supplementation. He has scheduled appointments with Dr. Chanel on 09/03/2025, Dr. Javed on 09/08/2025, and a urology follow-up on 09/09/2025.         Objective   Vital Signs:   Vitals:    07/02/25 0754   BP: 118/80   BP Location: Left arm   Patient Position: Sitting   Cuff Size: Adult   Pulse: 86   Resp: 18   Temp: 97.7 °F (36.5 °C)   TempSrc: Temporal   SpO2: 96%   Weight: 121 kg (267 lb)   Height: 177.8 cm (70\")     Body mass index is 38.31 kg/m².    Wt Readings from Last 3 Encounters:   07/02/25 121 kg (267 lb)   06/24/25 128 kg (282 lb)   05/19/25 128 kg (282 lb)     BP Readings from Last 3 Encounters:   07/02/25 118/80   05/19/25 143/79   05/06/25 132/78       Health Maintenance   Topic Date Due    URINE MICROALBUMIN-CREATININE RATIO (uACR) "  Never done    AAA SCREEN ONCE  Never done    Pneumococcal Vaccine 50+ (1 of 2 - PCV) 11/02/2025 (Originally 11/30/1969)    TDAP/TD VACCINES (1 - Tdap) 11/02/2025 (Originally 11/30/1969)    COVID-19 Vaccine (3 - 2024-25 season) 11/06/2025 (Originally 9/1/2024)    INFLUENZA VACCINE  12/29/2025 (Originally 7/1/2025)    HEMOGLOBIN A1C  11/01/2025    ANNUAL WELLNESS VISIT  11/06/2025    LIPID PANEL  05/01/2026    DIABETIC FOOT EXAM  05/06/2026    DIABETIC EYE EXAM  05/06/2026    COLORECTAL CANCER SCREENING  01/29/2028    HEPATITIS C SCREENING  Completed    ZOSTER VACCINE  Completed       Physical Exam  Vitals and nursing note reviewed.   Constitutional:       General: He is not in acute distress.     Appearance: Normal appearance.   HENT:      Head: Normocephalic and atraumatic.      Right Ear: External ear normal.      Left Ear: External ear normal.      Nose: Nose normal.      Mouth/Throat:      Mouth: Mucous membranes are moist.   Eyes:      Conjunctiva/sclera: Conjunctivae normal.   Cardiovascular:      Rate and Rhythm: Normal rate and regular rhythm.      Pulses: Normal pulses.      Heart sounds: Normal heart sounds. No murmur heard.     No friction rub. No gallop.   Pulmonary:      Effort: Pulmonary effort is normal. No respiratory distress.      Breath sounds: No wheezing, rhonchi or rales.   Musculoskeletal:      Cervical back: Neck supple.      Right lower leg: Edema (1+ pitting edema) present.      Left lower leg: Edema present.   Skin:     General: Skin is warm and dry.   Neurological:      General: No focal deficit present.      Mental Status: He is alert and oriented to person, place, and time.   Psychiatric:         Mood and Affect: Mood normal.         Behavior: Behavior normal.        Physical Exam        Result Review :  The following data was reviewed by: YEFRI Yung on 07/02/2025:         Results  Labs   - PSA: 7.2        Procedures            Assessment & Plan  Type 2 diabetes mellitus  without complication, without long-term current use of insulin      Orders:    Tirzepatide 5 MG/0.5ML solution auto-injector; Inject 5 mg under the skin into the appropriate area as directed 1 (One) Time Per Week.    Primary hypertension           Gastroesophageal reflux disease, unspecified whether esophagitis present         Low grade B cell lymphoproliferative disorder         Fatigue, unspecified type              Assessment & Plan  1. Diabetes Mellitus:  - 15-pound weight loss  - No significant adverse effects with Mounjaro 2.5 mg  - Increase Mounjaro to 5 mg  - 90-day supply of Mounjaro provided; monitor response and report adverse effects    2. Gastroesophageal Reflux Disease:  - Occasional acid reflux  - No significant improvement with omeprazole  - Continue current omeprazole regimen    3. Fatigue:  - Improved WBC count  - Monitor fatigue and report changes  - discussed healthy lifestyle habits    4. Low-grade B-cell lymphoproliferative disorder:  - Complete necessary labs before next appointment with Dr. Chanel    5. HTN  - well controlled. Cont current meds    Patient or patient representative verbalized consent for the use of Ambient Listening during the visit with  YEFRI Yung for chart documentation. 7/2/2025  08:31 EDT      FOLLOW UP  Return in about 8 weeks (around 8/27/2025).  Patient was given instructions and counseling regarding his condition or for health maintenance advice. Please see specific information pulled into the AVS if appropriate.     YEFRI Yung  07/02/25  08:32 EDT    CURRENT & DISCONTINUED MEDICATIONS  Current Outpatient Medications   Medication Instructions    acetaminophen (TYLENOL) 500 mg, Oral, Every 6 Hours PRN    amLODIPine-benazepril (LOTREL 5-20) 5-20 MG per capsule 1 capsule, Oral, Daily    aspirin 81 mg, Oral, Daily    Blood Pressure Monitoring (Blood Pressure Cuff) misc Use to take blood 1-2 times a day    cetirizine (ZYRTEC) 10 mg, Oral, Daily     Diclofenac Sodium (VOLTAREN) 4 g, Topical, 4 Times Daily PRN    doxazosin (CARDURA) 4 mg, Nightly    emollient cream     finasteride (PROSCAR) 5 mg, Oral, Daily    fluocinonide (LIDEX) 0.05 % cream 1 Application, Every 8 Hours Scheduled    hydroCHLOROthiazide 12.5 mg, Oral, Daily    metFORMIN ER (GLUCOPHAGE-XR) 1,000 mg, Oral, Daily With Breakfast    omeprazole (PRILOSEC) 20 mg, Oral, Daily    oxybutynin XL (DITROPAN-XL) 10 mg, Oral, Daily    rosuvastatin (CRESTOR) 20 mg, Oral, Daily    sildenafil (VIAGRA) 100 mg, Oral, Daily PRN    Sodium Fluoride 1.1 % cream     Tirzepatide 5 mg, Subcutaneous, Weekly    triamcinolone (KENALOG) 0.5 % cream 1 application , Topical, 3 Times Daily       Medications Discontinued During This Encounter   Medication Reason    doxazosin (Cardura) 4 MG tablet     Tirzepatide 2.5 MG/0.5ML solution auto-injector     amLODIPine-benazepril (LOTREL 5-20) 5-20 MG per capsule Reorder    metFORMIN ER (GLUCOPHAGE-XR) 500 MG 24 hr tablet Reorder    Tirzepatide 5 MG/0.5ML solution auto-injector

## 2025-07-02 NOTE — ASSESSMENT & PLAN NOTE
{Diabetes (Optional):[id number]}    Orders:    Tirzepatide 5 MG/0.5ML solution auto-injector; Inject 5 mg under the skin into the appropriate area as directed 1 (One) Time Per Week.

## 2025-07-03 ENCOUNTER — PATIENT ROUNDING (BHMG ONLY) (OUTPATIENT)
Dept: INTERNAL MEDICINE | Facility: CLINIC | Age: 75
End: 2025-07-03
Payer: MEDICARE

## 2025-07-03 NOTE — PROGRESS NOTES
A My-Chart message has been sent to the patient for PATIENT ROUNDING with [facility] [place].

## 2025-07-21 DIAGNOSIS — E11.9 TYPE 2 DIABETES MELLITUS WITHOUT COMPLICATION, WITHOUT LONG-TERM CURRENT USE OF INSULIN: ICD-10-CM

## 2025-07-21 NOTE — TELEPHONE ENCOUNTER
Requested Prescriptions:   Tirzepatide 5 MG/0.5ML solution auto-injector        Pharmacy where request should be sent:   [facility]    Additional details provided by patient:  He is going on vacation and is needing this before he leaves     Does the patient have less than a 3 day supply:  [] Yes  [x] No     Would you like a call back once the refill request has been completed: [x] Yes [] No     If the office needs to give you a call back, can they leave a voicemail: [x] Yes [] No

## 2025-08-29 ENCOUNTER — LAB (OUTPATIENT)
Dept: LAB | Facility: HOSPITAL | Age: 75
End: 2025-08-29
Payer: MEDICARE

## 2025-08-29 DIAGNOSIS — E11.9 TYPE 2 DIABETES MELLITUS WITHOUT COMPLICATION, WITHOUT LONG-TERM CURRENT USE OF INSULIN: ICD-10-CM

## 2025-08-29 DIAGNOSIS — D47.Z9 LOW GRADE B CELL LYMPHOPROLIFERATIVE DISORDER: ICD-10-CM

## 2025-08-29 LAB
ALBUMIN SERPL-MCNC: 4.5 G/DL (ref 3.5–5.2)
ALBUMIN/GLOB SERPL: 1.8 G/DL
ALP SERPL-CCNC: 137 U/L (ref 39–117)
ALT SERPL W P-5'-P-CCNC: 9 U/L (ref 1–41)
ANION GAP SERPL CALCULATED.3IONS-SCNC: 14 MMOL/L (ref 5–15)
AST SERPL-CCNC: 32 U/L (ref 1–40)
BASOPHILS # BLD MANUAL: 0.2 10*3/MM3 (ref 0–0.2)
BASOPHILS NFR BLD MANUAL: 1 % (ref 0–1.5)
BILIRUB SERPL-MCNC: 1.1 MG/DL (ref 0–1.2)
BLASTS NFR BLD MANUAL: 7 % (ref 0–0)
BUN SERPL-MCNC: 21.4 MG/DL (ref 8–23)
BUN/CREAT SERPL: 17.1 (ref 7–25)
CALCIUM SPEC-SCNC: 9.9 MG/DL (ref 8.6–10.5)
CHLORIDE SERPL-SCNC: 103 MMOL/L (ref 98–107)
CHOLEST SERPL-MCNC: 124 MG/DL (ref 0–200)
CO2 SERPL-SCNC: 24 MMOL/L (ref 22–29)
CREAT SERPL-MCNC: 1.25 MG/DL (ref 0.76–1.27)
DEPRECATED RDW RBC AUTO: 41.8 FL (ref 37–54)
EGFRCR SERPLBLD CKD-EPI 2021: 61.2 ML/MIN/1.73
EOSINOPHIL # BLD MANUAL: 0.2 10*3/MM3 (ref 0–0.4)
EOSINOPHIL NFR BLD MANUAL: 1 % (ref 0.3–6.2)
ERYTHROCYTE [DISTWIDTH] IN BLOOD BY AUTOMATED COUNT: 13.2 % (ref 12.3–15.4)
GLOBULIN UR ELPH-MCNC: 2.5 GM/DL
GLUCOSE SERPL-MCNC: 89 MG/DL (ref 65–99)
HBA1C MFR BLD: 5.7 % (ref 4.8–5.6)
HCT VFR BLD AUTO: 41.9 % (ref 37.5–51)
HDLC SERPL-MCNC: 43 MG/DL (ref 40–60)
HGB BLD-MCNC: 13.7 G/DL (ref 13–17.7)
LARGE PLATELETS: ABNORMAL
LDH SERPL-CCNC: 183 U/L (ref 135–225)
LDLC SERPL CALC-MCNC: 66 MG/DL (ref 0–100)
LDLC/HDLC SERPL: 1.54 {RATIO}
LYMPHOCYTES # BLD MANUAL: 6.49 10*3/MM3 (ref 0.7–3.1)
LYMPHOCYTES NFR BLD MANUAL: 6 % (ref 5–12)
MCH RBC QN AUTO: 28.5 PG (ref 26.6–33)
MCHC RBC AUTO-ENTMCNC: 32.7 G/DL (ref 31.5–35.7)
MCV RBC AUTO: 87.3 FL (ref 79–97)
METAMYELOCYTES NFR BLD MANUAL: 8 % (ref 0–0)
MONOCYTES # BLD: 1.22 10*3/MM3 (ref 0.1–0.9)
MYELOCYTES NFR BLD MANUAL: 2 % (ref 0–0)
NEUTROPHILS # BLD AUTO: 7.91 10*3/MM3 (ref 1.7–7)
NEUTROPHILS NFR BLD MANUAL: 39 % (ref 42.7–76)
PATHOLOGY REVIEW: YES
PLATELET # BLD AUTO: 215 10*3/MM3 (ref 140–450)
PMV BLD AUTO: 10.6 FL (ref 6–12)
POTASSIUM SERPL-SCNC: 3.9 MMOL/L (ref 3.5–5.2)
PROMYELOCYTES NFR BLD MANUAL: 4 % (ref 0–0)
PROT SERPL-MCNC: 7 G/DL (ref 6–8.5)
RBC # BLD AUTO: 4.8 10*6/MM3 (ref 4.14–5.8)
RBC MORPH BLD: NORMAL
SCAN SLIDE: NORMAL
SMALL PLATELETS BLD QL SMEAR: ADEQUATE
SODIUM SERPL-SCNC: 141 MMOL/L (ref 136–145)
TRIGL SERPL-MCNC: 74 MG/DL (ref 0–150)
TSH SERPL DL<=0.05 MIU/L-ACNC: 1.46 UIU/ML (ref 0.27–4.2)
VARIANT LYMPHS NFR BLD MANUAL: 12 % (ref 0–5)
VARIANT LYMPHS NFR BLD MANUAL: 20 % (ref 19.6–45.3)
VLDLC SERPL-MCNC: 15 MG/DL (ref 5–40)
WBC NRBC COR # BLD AUTO: 20.27 10*3/MM3 (ref 3.4–10.8)

## 2025-08-29 PROCEDURE — 80061 LIPID PANEL: CPT

## 2025-08-29 PROCEDURE — 85025 COMPLETE CBC W/AUTO DIFF WBC: CPT

## 2025-08-29 PROCEDURE — 83036 HEMOGLOBIN GLYCOSYLATED A1C: CPT

## 2025-08-29 PROCEDURE — 85007 BL SMEAR W/DIFF WBC COUNT: CPT

## 2025-08-29 PROCEDURE — 84443 ASSAY THYROID STIM HORMONE: CPT

## 2025-08-29 PROCEDURE — 83615 LACTATE (LD) (LDH) ENZYME: CPT

## 2025-08-29 PROCEDURE — 36415 COLL VENOUS BLD VENIPUNCTURE: CPT

## 2025-08-29 PROCEDURE — 80053 COMPREHEN METABOLIC PANEL: CPT

## (undated) DEVICE — STANDARD HYPODERMIC NEEDLE,POLYPROPYLENE HUB: Brand: MONOJECT

## (undated) DEVICE — SLV SCD KN/LEN ADJ EXPRSS BLENDED MD 1P/U

## (undated) DEVICE — BNDG GZ SOF-FORM CONFRM 2X75IN LF STRL

## (undated) DEVICE — MAJOR-LF: Brand: MEDLINE INDUSTRIES, INC.

## (undated) DEVICE — INTENDED FOR TISSUE SEPARATION, AND OTHER PROCEDURES THAT REQUIRE A SHARP SURGICAL BLADE TO PUNCTURE OR CUT.: Brand: BARD-PARKER ® CARBON RIB-BACK BLADES

## (undated) DEVICE — DRSNG WND GZ CURAD OIL EMULSION 3X3IN STRL

## (undated) DEVICE — GLV SURG BIOGEL LTX PF 7

## (undated) DEVICE — GLV SURG SENSICARE PI LF PF 7.5 GRN STRL

## (undated) DEVICE — CATH URETH INTRMIT ALLPURP LTX 16F RED

## (undated) DEVICE — SUT GUT CHRM 3/0 SH 27IN G122H

## (undated) DEVICE — BNDG ELAS CO-FLEX SLF ADHR 4IN5YD LF STRL

## (undated) DEVICE — BNDG COMPR S/ADHR 1IN 5YD TN NS

## (undated) DEVICE — GOWN,REINFORCE,POLY,SIRUS,BREATH SLV,XLG: Brand: MEDLINE

## (undated) DEVICE — TRY PREP SCRB VAG PVP

## (undated) DEVICE — PENCL E/S SMOKEEVAC W/TELESCP CANN